# Patient Record
Sex: MALE | Race: WHITE | NOT HISPANIC OR LATINO | Employment: OTHER | URBAN - METROPOLITAN AREA
[De-identification: names, ages, dates, MRNs, and addresses within clinical notes are randomized per-mention and may not be internally consistent; named-entity substitution may affect disease eponyms.]

---

## 2017-01-03 ENCOUNTER — PATIENT OUTREACH (OUTPATIENT)
Dept: HEALTH INFORMATION MANAGEMENT | Facility: OTHER | Age: 67
End: 2017-01-03

## 2017-01-03 ENCOUNTER — TELEPHONE (OUTPATIENT)
Dept: HEALTH INFORMATION MANAGEMENT | Facility: OTHER | Age: 67
End: 2017-01-03

## 2017-01-03 NOTE — TELEPHONE ENCOUNTER
Please have patient schedule appointment so we can evaluate him for his weight gain and his decreased urinary output.  Marlo Reynolds M.D.

## 2017-01-03 NOTE — PROGRESS NOTES
Pt outreach phone call to LSW. Pt reports that he needs to get to see provider on Friday 01/06/2017, but does not have transportation.    LSW inquired if pt had utilized [facility] as discussed in the past. Pt reports that he has not. LSW inquired if pt would be interested in re-applying for [facility], as pt has utilized their services in the past. Pt reports that he is not interested at thi time. LSW discussed with pt that pt should first contact [facility] to determine if they would be able to assist with transportation to pt's appt. On Friday. If pt is unable to, pt should contact LSW. Pt agreeable.    LSW gave pt contact information for [facility] and reminded pt of LSW's contact information.    Pt called LSW and left message. Pt reports that [facility] should be able to take pt to appt and pt will contact LSW if he should have any issues.

## 2017-01-03 NOTE — PROGRESS NOTES
CC outreach call to patient for follow-up on PCP message for patient to schedule appointment. CC noted appointment scheduled for 1/26/17.  CC discussed with patient and recommended patient schedule sooner appointment for weight gain and decreased urinary output.  Patient states he wasn't sure if he would have transportation this week. Patient has been working with  on transportation issues. Patient states he has not contacted [facility] for transportation as previously recommended. CC scheduled patient for sooner appointment with PCP for 1/6/17 at 11:00am. Patient understands to contact [facility] for transportation. CC gave update to .  Per discussion with , patient was able to arrange transportation for Friday 1/6/17.

## 2017-01-03 NOTE — PROGRESS NOTES
"Outreach call after receiving alert on [facility] program.  Patients weight is up 5.5 pounds since 12/30/16. Current weight 167.5 today. Weight on 12/30/16 162 lbs.  Patient states he is not urinating as frequently as he was. States urine output has been decreasing over the past few days. States \"I'm only going small amounts\".  States no leg edema. Reports no worsening shortness of breath.  Reports he is taking lasix 20 mg daily. Blood pressure reading today is 146/71.   CC will send message to PCP for recommendations.            "

## 2017-01-05 ENCOUNTER — PATIENT OUTREACH (OUTPATIENT)
Dept: HEALTH INFORMATION MANAGEMENT | Facility: OTHER | Age: 67
End: 2017-01-05

## 2017-01-05 NOTE — PROGRESS NOTES
Outreach call to patient for follow-up after receiving alert on RHM. Weight today is 170 lbs. Up from yesterday of 167.5.  CC left message requesting return call to [phone]. Patient is scheduled to see PCP tomorrow regarding weight gain and decreased urinary output.

## 2017-01-06 ENCOUNTER — OFFICE VISIT (OUTPATIENT)
Dept: MEDICAL GROUP | Facility: MEDICAL CENTER | Age: 67
End: 2017-01-06
Payer: MEDICARE

## 2017-01-06 ENCOUNTER — HOSPITAL ENCOUNTER (OUTPATIENT)
Facility: MEDICAL CENTER | Age: 67
End: 2017-01-06
Attending: NURSE PRACTITIONER
Payer: MEDICARE

## 2017-01-06 ENCOUNTER — SUPERVISING PHYSICIAN REVIEW (OUTPATIENT)
Dept: MEDICAL GROUP | Facility: MEDICAL CENTER | Age: 67
End: 2017-01-06

## 2017-01-06 ENCOUNTER — PATIENT OUTREACH (OUTPATIENT)
Dept: HEALTH INFORMATION MANAGEMENT | Facility: OTHER | Age: 67
End: 2017-01-06

## 2017-01-06 VITALS
SYSTOLIC BLOOD PRESSURE: 144 MMHG | DIASTOLIC BLOOD PRESSURE: 80 MMHG | TEMPERATURE: 97.5 F | BODY MASS INDEX: 24.62 KG/M2 | OXYGEN SATURATION: 80 % | WEIGHT: 172 LBS | HEART RATE: 94 BPM | RESPIRATION RATE: 16 BRPM | HEIGHT: 70 IN

## 2017-01-06 DIAGNOSIS — J96.11 CHRONIC RESPIRATORY FAILURE WITH HYPOXIA (HCC): ICD-10-CM

## 2017-01-06 DIAGNOSIS — R30.0 DYSURIA: ICD-10-CM

## 2017-01-06 DIAGNOSIS — R63.5 WEIGHT GAIN: ICD-10-CM

## 2017-01-06 DIAGNOSIS — K74.60 CIRRHOSIS OF LIVER WITHOUT ASCITES, UNSPECIFIED HEPATIC CIRRHOSIS TYPE (HCC): ICD-10-CM

## 2017-01-06 DIAGNOSIS — I10 ESSENTIAL HYPERTENSION: Chronic | ICD-10-CM

## 2017-01-06 LAB
APPEARANCE UR: CLEAR
BILIRUB UR STRIP-MCNC: NORMAL MG/DL
COLOR UR AUTO: YELLOW
GLUCOSE UR STRIP.AUTO-MCNC: NORMAL MG/DL
KETONES UR STRIP.AUTO-MCNC: NORMAL MG/DL
LEUKOCYTE ESTERASE UR QL STRIP.AUTO: NORMAL
NITRITE UR QL STRIP.AUTO: NORMAL
PH UR STRIP.AUTO: 5 [PH] (ref 5–8)
PROT UR QL STRIP: NORMAL MG/DL
RBC UR QL AUTO: NORMAL
SP GR UR STRIP.AUTO: 1.01
UROBILINOGEN UR STRIP-MCNC: NORMAL MG/DL

## 2017-01-06 PROCEDURE — 81003 URINALYSIS AUTO W/O SCOPE: CPT

## 2017-01-06 PROCEDURE — 99214 OFFICE O/P EST MOD 30 MIN: CPT | Performed by: NURSE PRACTITIONER

## 2017-01-06 PROCEDURE — 81002 URINALYSIS NONAUTO W/O SCOPE: CPT | Performed by: NURSE PRACTITIONER

## 2017-01-06 RX ORDER — AMLODIPINE BESYLATE 5 MG/1
5 TABLET ORAL DAILY
Qty: 90 TAB | Refills: 0 | Status: SHIPPED | OUTPATIENT
Start: 2017-01-06 | End: 2017-01-26 | Stop reason: SDUPTHER

## 2017-01-06 NOTE — PROGRESS NOTES
Subjective:   Mumtaz Carney is a 66 y.o. male here today for weight gain and hypertension.    Chronic respiratory failure with hypoxia  Patient's O2 saturation upon reaming was down to 80% on room air. He usually wears oxygen at 3 L, his tank ran out during transportation to the office.  Upon replacing O2 at 3 L oxygen up to 93-96%. I recommended that we contact [facility] to have them come to office in refill his tank. He refuses. States he has a ride home and will replace oxygen upon going home.  Has been noncompliant with his inhalers.  Was prescribed Advair in October but did not start. He believes it was not covered by insurance. Also prescribe Spiriva was out for a month but restarted last week. Has been using his albuterol 2-3 times daily. Between oxygen and albuterol use he denies shortness of breath. He is scheduled to follow-up with pulmonology in 4 days.      HTN (hypertension)  Patient is followed by [facility]HM program. He was advised by outreach RN to follow-up in office for elevated readings and decreased urinary output dysuria.  Initial BP reading this morning was 173/89, 20 minutes later down to 154/84.  Readings have been fluctuating over the last week. See patient outreach note for list of readings. Current medications include Coreg 3.125 mg twice daily, lisinopril 10 mg daily, Lasix 20 mg daily and potassium 20 mEq daily. He has been taking as prescribed without known adverse affect.   Not following a low salt diet. Working on tobacco cessation. No exercise.  Denies chest pain, palpitations, vision changes, pedal edema.      Weight gain  Patient has had a 10 pound weight gain since December 26.  He has known cirrhosis.  Also reports decreased urinary output. Reports urinating between 8-10 times yesterday. States he was up to urinate every couple hours last night. States he has the urge to urinate but has small output. Feels that he is fully emptying his bladder. Denies hematuria and painful  urination. Denies fever, abdominal, suprapubic, flank and low back pain. UA in office today is unremarkable.             Current medicines (including changes today)  Current Outpatient Prescriptions   Medication Sig Dispense Refill   • amlodipine (NORVASC) 5 MG Tab Take 1 Tab by mouth every day. 90 Tab 0   • oxycodone-acetaminophen (PERCOCET) 7.5-325 MG per tablet Take 1 Tab by mouth every 6 hours as needed for Severe Pain. May refill 11/30/16 120 Tab 0   • oxycodone-acetaminophen (PERCOCET) 7.5-325 MG per tablet Take 1 Tab by mouth every 6 hours as needed. 120 Tab 0   • oxycodone-acetaminophen (PERCOCET) 7.5-325 MG per tablet Take 1 Tab by mouth every 6 hours as needed. 120 Tab 0   • ondansetron (ZOFRAN ODT) 4 MG TABLET DISPERSIBLE Take 1 Tab by mouth every 8 hours as needed for Nausea/Vomiting. 10 Tab 0   • furosemide (LASIX) 20 MG Tab TAKE 1 TABLET BY MOUTH DAILY 90 Tab 3   • metformin (GLUCOPHAGE) 1000 MG tablet TAKE 1 TABLET BY MOUTH TWICE DAILY WITH MEALS 180 Tab 1   • fluticasone-salmeterol (ADVAIR) 250-50 MCG/DOSE AEROSOL POWDER, BREATH ACTIVATED Inhale 1 Puff by mouth every 12 hours. 1 Inhaler 11   • tamsulosin (FLOMAX) 0.4 MG capsule TAKE 1 CAPSULE BY MOUTH EVERY DAY 90 Cap 1   • lisinopril (PRINIVIL) 10 MG Tab TAKE 1 TABLET BY MOUTH EVERY DAY 90 Tab 3   • alprazolam (XANAX) 0.25 MG Tab Take 1 Tab by mouth at bedtime as needed for Sleep. 90 Tab 1   • pravastatin (PRAVACHOL) 10 MG Tab TAKE 1 TABLET BY MOUTH EVERY DAY 90 Tab 3   • carvedilol (COREG) 3.125 MG Tab TAKE 1 TABLET BY MOUTH TWICE DAILY 180 Tab 3   • albuterol (VENTOLIN OR PROVENTIL) 108 (90 BASE) MCG/ACT Aero Soln inhalation aerosol Inhale 2 Puffs by mouth every 6 hours as needed. 8.5 g 6   • tiotropium (SPIRIVA HANDIHALER) 18 MCG Cap INHALE 1 CAPSULE BY MOUTH VIA HANDIHALER EVERY DAY 90 Cap 3   • potassium chloride SA (K-DUR) 20 MEQ Tab CR TAKE 1 TABLET BY MOUTH EVERY DAY 90 Tab 3   • sertraline (ZOLOFT) 50 MG Tab TAKE 1 TABLET BY MOUTH DAILY 90  "Tab 3   • aspirin 81 MG tablet Take 81 mg by mouth every morning.     • omeprazole (PRILOSEC) 20 MG CPDR Take 20 mg by mouth 2 Times a Day.       No current facility-administered medications for this visit.     He  has a past medical history of Aortic stenosis; Wound; Hypertension; Bronchitis; COPD; CHF (congestive heart failure) (HCC) (6/11/2010); Tobacco abuse (6/11/2010); COPD (chronic obstructive pulmonary disease) (HCC); Severe aortic stenosis (12/17/2013); Hepatitis C (1/16/2014); Other specified disorder of intestines; Indigestion; Heart burn; Other emphysema (HCC); Breath shortness; Psychiatric problem; MRSA (methicillin resistant Staphylococcus aureus); and Cirrhosis (HCC) (2014). He also has no past medical history of Unspecified hemorrhagic conditions (HCC), Personal history of venous thrombosis and embolism, Cancer (HCC), Diabetes, Unspecified disorder of thyroid, Glaucoma, CATARACT, Stroke (HCC), Seizure (HCC), Myocardial infarct (HCC), Arrhythmia, Pacemaker, Angina, Heart murmur, Rheumatic fever, ASTHMA, Pneumonia, Jaundice, Unspecified urinary incontinence, Renal disorder, Dialysis, Other specified symptom associated with female genital organs, Arthritis, or Backpain.    ROS   No chest pain, no shortness of breath, no abdominal pain       Objective:     Blood pressure 144/80, pulse 94, temperature 36.4 °C (97.5 °F), resp. rate 16, height 1.778 m (5' 10\"), weight 78.019 kg (172 lb), SpO2 80 %. Body mass index is 24.68 kg/(m^2).   Physical Exam:  Constitutional: Alert, no distress.  Skin: Warm, dry, good turgor  Eye: Equal, round and reactive, conjunctiva clear, lids normal.  ENMT: Lips without lesions, good dentition, oropharynx clear.  Respiratory: Unlabored respiratory effort, lungs clear to auscultation, no wheezes, no ronchi.  Cardiovascular: Normal S1, S2, no murmur, no pedal edema.  Abdomen: Nontender. No hepatosplenomegaly. An upright position abdomen is fluctuant. Non tender. No suprapubic " tenderness.  Psych: Alert and oriented x3, normal affect and mood.        Assessment and Plan:   The following treatment plan was discussed    1. Chronic respiratory failure with hypoxia (HCC)  Advised patient to use all inhalers as prescribed by pulmonology.  Continue smoking cessation encouraged smoking cessation.  Continue oxygen use at 3 L via NC.  Follow-up with pulmonology as scheduled in 4 days.  .    2. Weight gain  New finding.  Abdominal ultrasound ordered to rule out ascites.  CMP ordered.  Follow-up with PCP after tests.    3. Essential hypertension  Uncontrolled.  Continue BP meds as prescribed by PCP.  Add amlodipine 5 mg daily.   Reviewed common adverse effects of medication in detail with pt.  Continue daily BP monitoring.  Echocardiogram, CMP and urine microalbumin creatinine ratio ordered.  Follow-up with PCP after test.    4. Dysuria  UA in office unremarkable. Sent for microscopic analysis and culture.   Complete labs and imaging as stated above and f/u with PCP.    Followup: Return in about 2 weeks (around 1/20/2017).

## 2017-01-06 NOTE — ASSESSMENT & PLAN NOTE
Patient has had a 10 pound weight gain since December 26.  He has known cirrhosis.  Also reports decreased urinary output. Reports urinating between 8-10 times yesterday. States he was up to urinate every couple hours last night. States he has the urge to urinate but has small output. Feels that he is fully emptying his bladder. Denies hematuria and painful urination. Denies fever, abdominal, suprapubic, flank and low back pain. UA in office today is unremarkable.

## 2017-01-06 NOTE — ASSESSMENT & PLAN NOTE
Patient is followed by [facility] program. He was advised by outreach RN to follow-up in office for elevated readings and decreased urinary output dysuria.  Initial BP reading this morning was 173/89, 20 minutes later down to 154/84.  Readings have been fluctuating over the last week. See patient outreach note for list of readings. Current medications include Coreg 3.125 mg twice daily, lisinopril 10 mg daily, Lasix 20 mg daily and potassium 20 mEq daily. He has been taking as prescribed without known adverse affect.   Not following a low salt diet. Working on tobacco cessation. No exercise.  Denies chest pain, palpitations, vision changes, pedal edema.

## 2017-01-06 NOTE — MR AVS SNAPSHOT
"        Mumtaz Carney   2017 11:00 AM   Office Visit   MRN: [id number]    Department:  [facility]   Dept Phone:  [phone]    Description:  Male : 1950   Provider:  JAYNE Loaiza           Reason for Visit     Other weight gain     Urinary Retention           Allergies as of 2017     No Known Allergies      You were diagnosed with     Chronic respiratory failure with hypoxia (HCC)   [213186]       Weight gain   [793239]       Essential hypertension   [6282917]       Dysuria   [788.1.ICD-9-CM]         Vital Signs     Blood Pressure Pulse Temperature Respirations Height Weight    144/80 mmHg 94 36.4 °C (97.5 °F) 16 1.778 m (5' 10\") 78.019 kg (172 lb)    Body Mass Index Oxygen Saturation Smoking Status             24.68 kg/m2 80% Current Some Day Smoker         Basic Information     Date Of Birth Sex Race Ethnicity Preferred Language    1950 Male White Non- English      Your appointments     Eliezer 10, 2017  2:00 PM   Pulmonary Function Test with PFT-RM2   [facility] (--)    [address]  [address]  [place] [id number] [phone]            Eliezer 10, 2017  3:00 PM   Established Patient Pul with Osei Garcia M.D.   [facility] (--)    [address]  [address]  [place] [id number] [phone]            2017 10:20 AM   Established Patient with Marlo Reynolds M.D.   [facility] [place] ([facility])    [address]  [place] [id number] [phone]           You will be receiving a confirmation call a few days before your appointment from our automated call confirmation system.              Problem List              ICD-10-CM Priority Class Noted - Resolved    COPD (chronic obstructive pulmonary disease) (HCC) (Chronic) J44.9   2010 - Present    Tobacco abuse (Chronic) Z72.0   2010 - Present    HTN (hypertension) (Chronic) I10   2010 - Present    Hx of drug " abuse Z87.898   12/17/2013 - Present    Elevated liver enzymes R74.8   12/17/2013 - Present    BPH (benign prostatic hyperplasia) N40.0   12/17/2013 - Present    Hepatitis C B19.20   1/16/2014 - Present    Cirrhosis (HCC) K74.60   7/18/2014 - Present    Diabetes mellitus type 2, controlled (HCC) E11.9   7/29/2014 - Present    Insomnia G47.00   8/21/2014 - Present    Chronic lower back pain M54.5, G89.29   8/28/2014 - Present    Hyperlipidemia E78.5   3/18/2015 - Present    MDD (major depressive disorder), recurrent episode, mild (HCC) F33.0   7/19/2015 - Present    Right hip pain M25.551   9/15/2015 - Present    Drug-induced hypokalemia E87.6   2/28/2016 - Present    Chronic respiratory failure with hypoxia (HCC) J96.11   5/23/2016 - Present    Chronic, continuous use of opioids F11.90   11/28/2016 - Present    Potential for deficient knowledge of chronic obstructive pulmonary disease Z91.89   12/1/2016 - Present    Weight gain R63.5   1/6/2017 - Present      Health Maintenance        Date Due Completion Dates    RETINAL SCREENING 11/12/1967 ---    IMM DTaP/Tdap/Td Vaccine (1 - Tdap) 11/12/1968 ---    IMM ZOSTER VACCINE 11/12/2009 ---    DIABETES MONOFILAMTENT / LE EXAM 3/18/2016 3/18/2015, 8/28/2014    IMM INFLUENZA (1) 9/1/2016 ---    A1C SCREENING 2/27/2017 8/27/2016, 2/19/2016, 9/9/2015, 6/22/2015, 6/16/2015, 3/3/2015, 3/3/2015, 7/18/2014, 8/8/2012    FASTING LIPID PROFILE 8/27/2017 8/27/2016, 2/19/2016, 9/9/2015, 6/16/2015, 3/3/2015, 3/3/2015, 9/10/2014    URINE ACR / MICROALBUMIN 8/27/2017 8/27/2016, 2/19/2016, 3/3/2015, 9/10/2014 (Done)    Override on 9/10/2014: Done    SERUM CREATININE 11/22/2017 11/22/2016, 9/7/2016, 8/27/2016, 2/19/2016, 9/9/2015, 6/16/2015, 3/3/2015, 3/3/2015, 9/10/2014, 7/23/2014, 7/20/2014, 7/19/2014, 7/19/2014, 7/18/2014, 7/18/2014, 7/18/2014, 8/8/2012, 6/11/2010, 7/16/2009, 5/1/2006, 4/22/2006, 2/15/2005    COLONOSCOPY 7/10/2019 7/10/2014    IMM PNEUMOCOCCAL 65+ (ADULT) LOW/MEDIUM  RISK SERIES (2 of 2 - PPSV23) 7/23/2019 8/29/2016, 7/23/2014            Results     POCT Urinalysis      Component Value Standard Range & Units    POC Color YELLOW Negative    POC Appearance CLEAR Negative    POC Leukocyte Esterase NEG Negative    POC Nitrites NEG Negative    POC Urobiligen NEG Negative (0.2) mg/dL    POC Protein NEG Negative mg/dL    POC Urine PH 5.0 5.0 - 8.0    POC Blood NEG Negative    POC Specific Gravity 1.015 <1.005 - >1.030    POC Ketones NEG Negative mg/dL    POC Biliruben NEG Negative mg/dL    POC Glucose NEG Negative mg/dL                        Current Immunizations     13-VALENT PCV PREVNAR 8/29/2016    Pneumococcal polysaccharide vaccine (PPSV-23) 7/23/2014  9:23 AM      Below and/or attached are the medications your provider expects you to take. Review all of your home medications and newly ordered medications with your provider and/or pharmacist. Follow medication instructions as directed by your provider and/or pharmacist. Please keep your medication list with you and share with your provider. Update the information when medications are discontinued, doses are changed, or new medications (including over-the-counter products) are added; and carry medication information at all times in the event of emergency situations     Allergies:  No Known Allergies          Medications  Valid as of: January 06, 2017 - 11:21 AM    Generic Name Brand Name Tablet Size Instructions for use    Albuterol Sulfate (Aero Soln) albuterol 108 (90 BASE) MCG/ACT Inhale 2 Puffs by mouth every 6 hours as needed.        ALPRAZolam (Tab) XANAX 0.25 MG Take 1 Tab by mouth at bedtime as needed for Sleep.        Aspirin (Tab) aspirin 81 MG Take 81 mg by mouth every morning.        Carvedilol (Tab) COREG 3.125 MG TAKE 1 TABLET BY MOUTH TWICE DAILY        Fluticasone-Salmeterol (AEROSOL POWDER, BREATH ACTIVATED) ADVAIR 250-50 MCG/DOSE Inhale 1 Puff by mouth every 12 hours.        Furosemide (Tab) LASIX 20 MG TAKE 1  TABLET BY MOUTH DAILY        Lisinopril (Tab) PRINIVIL 10 MG TAKE 1 TABLET BY MOUTH EVERY DAY        MetFORMIN HCl (Tab) GLUCOPHAGE 1000 MG TAKE 1 TABLET BY MOUTH TWICE DAILY WITH MEALS        Omeprazole (CAPSULE DELAYED RELEASE) PRILOSEC 20 MG Take 20 mg by mouth 2 Times a Day.        Ondansetron (TABLET DISPERSIBLE) ZOFRAN ODT 4 MG Take 1 Tab by mouth every 8 hours as needed for Nausea/Vomiting.        Oxycodone-Acetaminophen (Tab) PERCOCET 7.5-325 MG Take 1 Tab by mouth every 6 hours as needed for Severe Pain. May refill 11/30/16        Oxycodone-Acetaminophen (Tab) PERCOCET 7.5-325 MG Take 1 Tab by mouth every 6 hours as needed.        Oxycodone-Acetaminophen (Tab) PERCOCET 7.5-325 MG Take 1 Tab by mouth every 6 hours as needed.        Potassium Chloride Abi CR (Tab CR) K-DUR 20 MEQ TAKE 1 TABLET BY MOUTH EVERY DAY        Pravastatin Sodium (Tab) PRAVACHOL 10 MG TAKE 1 TABLET BY MOUTH EVERY DAY        Sertraline HCl (Tab) ZOLOFT 50 MG TAKE 1 TABLET BY MOUTH DAILY        Tamsulosin HCl (Cap) FLOMAX 0.4 MG TAKE 1 CAPSULE BY MOUTH EVERY DAY        Tiotropium Bromide Monohydrate (Cap) SPIRIVA 18 MCG INHALE 1 CAPSULE BY MOUTH VIA HANDIHALER EVERY DAY        .                 Medicines prescribed today were sent to:     [facility] [address] [place] [address] [address] [facility] [address]    Phone: [phone] Fax: [phone]    Open 24 Hours?: Yes      Medication refill instructions:       If your prescription bottle indicates you have medication refills left, it is not necessary to call your provider’s office. Please contact your pharmacy and they will refill your medication.    If your prescription bottle indicates you do not have any refills left, you may request refills at any time through one of the following ways: The online [employer] system (except Urgent Care), by calling your provider’s office, or by asking your pharmacy to contact your provider’s  office with a refill request. Medication refills are processed only during regular business hours and may not be available until the next business day. Your provider may request additional information or to have a follow-up visit with you prior to refilling your medication.   *Please Note: Medication refills are assigned a new Rx number when refilled electronically. Your pharmacy may indicate that no refills were authorized even though a new prescription for the same medication is available at the pharmacy. Please request the medicine by name with the pharmacy before contacting your provider for a refill.        Your To Do List     Future Labs/Procedures Complete By Expires    COMP METABOLIC PANEL  As directed 1/7/2018    ECHOCARDIOGRAM COMP W/O CONT  As directed 1/7/2018    MICROALBUMIN CREAT RATIO URINE  As directed 1/7/2018    URINALYSIS,CULTURE IF INDICATED  As directed 1/7/2018      Other Notes About Your Plan     Enrolled in [facility] Team with Dr. Reynolds.           Ema Status: Patient Declined

## 2017-01-06 NOTE — PROGRESS NOTES
Outreach call to patient.  Alert received on RHM readings.  Weight today is 170.5 lbs. Up 10 lbs since 12/26/16.   Blood pressure reading is 154/84.  Patient reports he still has intermittent problem with urinating. He is scheduled to see his PCP at 11:00 today. States he does have transportation to appointment.  Below is RHM readings over past week.  Patient states he will discuss weight gain and blood pressure readings at PCP appointment. Patient will also discuss problem with decreased urinary output.CC will continue to follow in [facility]HM program.

## 2017-01-06 NOTE — PROGRESS NOTES
I have reviewed and agree with history, assessment and plan for office encounter on 1/6/2017 with Carie Zhang.  Face to Face encounter/direct observation: no  Suggestions as follows: No change to plan or follow up  Marlo Reynolds M.D.

## 2017-01-06 NOTE — ASSESSMENT & PLAN NOTE
Patient's O2 saturation upon reaming was down to 80% on room air. He usually wears oxygen at 3 L, his tank ran out during transportation to the office.  Upon replacing O2 at 3 L oxygen up to 93-96%. I recommended that we contact [facility] to have them come to office in refill his tank. He refuses. States he has a ride home and will replace oxygen upon going home.  Has been noncompliant with his inhalers.  Was prescribed Advair in October but did not start. He believes it was not covered by insurance. Also prescribe Spiriva was out for a month but restarted last week. Has been using his albuterol 2-3 times daily. Between oxygen and albuterol use he denies shortness of breath. He is scheduled to follow-up with pulmonology in 4 days.

## 2017-01-07 LAB
APPEARANCE UR: CLEAR
BILIRUB UR QL STRIP.AUTO: NEGATIVE
COLOR UR AUTO: NORMAL
CULTURE IF INDICATED INDCX: NO UA CULTURE
GLUCOSE UR STRIP.AUTO-MCNC: NEGATIVE MG/DL
KETONES UR STRIP.AUTO-MCNC: NEGATIVE MG/DL
LEUKOCYTE ESTERASE UR QL STRIP.AUTO: NEGATIVE
MICRO URNS: NORMAL
NITRITE UR QL STRIP.AUTO: NEGATIVE
PH UR: 5 [PH]
PROT UR QL STRIP: NEGATIVE MG/DL
RBC UR QL AUTO: NEGATIVE
SP GR UR STRIP.AUTO: 1.01

## 2017-01-09 ENCOUNTER — PATIENT OUTREACH (OUTPATIENT)
Dept: HEALTH INFORMATION MANAGEMENT | Facility: OTHER | Age: 67
End: 2017-01-09

## 2017-01-09 ENCOUNTER — TELEPHONE (OUTPATIENT)
Dept: MEDICAL GROUP | Facility: MEDICAL CENTER | Age: 67
End: 2017-01-09

## 2017-01-09 NOTE — TELEPHONE ENCOUNTER
----- Message from JAYNE Zeng sent at 1/9/2017 10:50 AM PST -----  Please advise patient;  Urine sample shows no indication of infection

## 2017-01-09 NOTE — PROGRESS NOTES
Outreach call after receiving alerts on RHM.  B/P readings remain elevated. Today's reading is 168/98. CC requested patient recheck reading. Patient reports he has not yet taken his medications today. CC recommended patient take medications and recheck reading later today.  CC reviewed PCP recommendations from Friday's appointment on 1/6/17. Patient did not  new prescription for amlodipine. States he will get prescription today. CC strongly recommended patient get prescription and take as directed for elevated B/P readings.   Reviewed PCP recommendation for Ultrasound of abdomen, echocardiogram. Also patient is questioning CC if he still needs to complete a CT scan. CC noted order in [facility] for CT of chest ordered 10/7/17 by Dr. Garcia. Patient states he did not get this completed.  Patient is requesting assistance from CC to get the imaging tests scheduled. CC contacted imaging at [phone] and was placed on hold. CC left message requesting return call to [phone] to assist patient with scheduling.  Plan:  CC will assist patient with scheduling appointments after receiving return call from imaging.  CC recommended patient get prescription filled for amlodipine and start today as ordered by PCP.  CC reviewed upcoming appointment tomorrow with pulmonary provider at 2:00 pm. Patient needed CC to review appointment date several times. Patient reports he did appreciate transportation assistance and will contact [facility] for transportation to appointments.   CC discussed with patient to have full tank of oxygen when going to appointments. Patient previously ran out of oxygen at PCP appointment on 1/6/17. Patient voiced understanding.

## 2017-01-10 ENCOUNTER — PATIENT OUTREACH (OUTPATIENT)
Dept: HEALTH INFORMATION MANAGEMENT | Facility: OTHER | Age: 67
End: 2017-01-10

## 2017-01-10 ENCOUNTER — APPOINTMENT (OUTPATIENT)
Dept: PULMONOLOGY | Facility: HOSPICE | Age: 67
End: 2017-01-10
Payer: MEDICARE

## 2017-01-10 NOTE — PROGRESS NOTES
Outreach call after receiving alert on RHM readings. B/P today is 159/91. Patient states he did get prescription filled for amlodipine and is taking as directed.  Reviewed upcoming appointments. Patient states he received a call from pulmonary office to cancel appointment for today. CC recommended patient contact pulmonary office to determine if appointment is needing to be rescheduled. Patient voiced understanding and states he does have telephone number.

## 2017-01-10 NOTE — PROGRESS NOTES
Patient left message stating he is having a difficult time scheduling appointment. CC returned patient call. States he is frustrated because he doesn't know how to schedule the CT scan. Reports pulmonary office cancelled his appointment today.  CC contacted Oralia at pulmonary office regarding patient questioning if he needs to have CT done and if order is in Epic. Oralia will contact patient to assist getting  CT scheduled for patient.

## 2017-01-12 ENCOUNTER — PATIENT OUTREACH (OUTPATIENT)
Dept: HEALTH INFORMATION MANAGEMENT | Facility: OTHER | Age: 67
End: 2017-01-12

## 2017-01-12 ENCOUNTER — APPOINTMENT (OUTPATIENT)
Dept: RADIOLOGY | Facility: MEDICAL CENTER | Age: 67
End: 2017-01-12
Attending: INTERNAL MEDICINE
Payer: MEDICARE

## 2017-01-12 NOTE — PROGRESS NOTES
Outreach call to patient for follow-up on RHM readings. B/P today at 162/85. Patient reports he is taking all of his B/P medications including amlodipine that was ordered at last weeks appointment with PCP.  Reports no change in condition today. Reports he will get CT completed tomorrow.

## 2017-01-13 ENCOUNTER — HOSPITAL ENCOUNTER (OUTPATIENT)
Dept: RADIOLOGY | Facility: MEDICAL CENTER | Age: 67
End: 2017-01-13
Attending: INTERNAL MEDICINE
Payer: MEDICARE

## 2017-01-13 DIAGNOSIS — J44.9 CHRONIC OBSTRUCTIVE PULMONARY DISEASE, UNSPECIFIED COPD TYPE (HCC): ICD-10-CM

## 2017-01-13 PROCEDURE — 71250 CT THORAX DX C-: CPT

## 2017-01-16 ENCOUNTER — PATIENT OUTREACH (OUTPATIENT)
Dept: HEALTH INFORMATION MANAGEMENT | Facility: OTHER | Age: 67
End: 2017-01-16

## 2017-01-16 RX ORDER — ASPIRIN 325 MG
325 TABLET ORAL DAILY
COMMUNITY

## 2017-01-16 NOTE — PROGRESS NOTES
Outbound call to Mumtaz for medication reconciliation.    Updated allergy and medication lists.    Patient demonstrates understanding of indications for medications.    Mumtaz states that he had carvedilol tablets in his furosemide bottle. He believes that when he picked up his medication from the pharmacy he dumped his extra tablets into the newer bottle and that is how they got mixed up. I reviewed the tablet descriptions of these medications with the patient to clarify each. He states that he only has 2 tablets of his furosemide left. I called [facility] on the patient's behalf to request a refill of his furosemide. Camryn at [facility] stated the patient picked up 90 day supply on 11/6/16 and the fill is too soon. I called patient back to explain this and he stated that he did not receive that. He will call Camryn at [facility] to discuss.     Patient denies any side effects from his current medications.     Mumtaz reports that he seldomly uses the alprazolam. When he does take this he uses it more for anxiety than for sleep. He reports that at most he will use 3 to 4 tablets in a week. He denies dizziness/ drowsiness with this medication and is aware that he should not combine the alprazolam with the percocet.     Patient recently quit smoking. He is now using Nicotine replacement patches. Patient reports that he has significantly cut back on the number of cigarettes he smokes.     Mumtaz reports that his fasting blood sugars are running between 110-120.     Patient can afford his medications.    Plan:    Recommend that Mumtaz receive his flu vaccination and shingles vaccination. The patient stated that he will get these the next time he goes to his pharmacy.     Recommended the patient could try a weekly pill container to sort out his medications. He declines this and states that he has a system with his bottles.     Carly Curry, JENNIFERD

## 2017-01-19 ENCOUNTER — PATIENT OUTREACH (OUTPATIENT)
Dept: HEALTH INFORMATION MANAGEMENT | Facility: OTHER | Age: 67
End: 2017-01-19

## 2017-01-19 NOTE — PROGRESS NOTES
CC outreach call after receiving alert on RHM program. Message left requesting return call to CC at [phone]. B/P readings continue to be elevated. See below. Patient has a follow-up with PCP for 1/26/17.

## 2017-01-23 ENCOUNTER — PATIENT OUTREACH (OUTPATIENT)
Dept: HEALTH INFORMATION MANAGEMENT | Facility: OTHER | Age: 67
End: 2017-01-23

## 2017-01-23 NOTE — PROGRESS NOTES
Outreach call after receiving alert on RHM program. Blood pressure readings remain elevated. Patient is scheduled to see PCP on 1/26/17 and will review blood pressure. Patient states he is taking his medications as directed.   Reports no chest pain, no headache. States he has been stressed trying to get his television to work. States he has stress because of the paperwork he needs to get completed for Medicaid. Reports he needs to get to Medicaid office today.  CC reviewed all upcoming appointments. Patient is trying to get transportation with [facility]. States he may need to take the bus. Patient has worked with  on Transportation.

## 2017-01-24 ENCOUNTER — PATIENT OUTREACH (OUTPATIENT)
Dept: HEALTH INFORMATION MANAGEMENT | Facility: OTHER | Age: 67
End: 2017-01-24

## 2017-01-24 NOTE — PROGRESS NOTES
Outreach call after receiving alert on RHM readings. B/P readings remain elevated. Patient denies any chest pain or shortness of breath. Scheduled to see PCP on 1/26/17. Previous readings at PCP office in November have been in good range.   Patient will discuss his blood pressure with PCP on 1/26/17. Update of readings sent to PCP.

## 2017-01-25 ENCOUNTER — HOSPITAL ENCOUNTER (OUTPATIENT)
Dept: CARDIOLOGY | Facility: MEDICAL CENTER | Age: 67
End: 2017-01-25
Attending: FAMILY MEDICINE | Admitting: FAMILY MEDICINE
Payer: MEDICARE

## 2017-01-25 ENCOUNTER — TELEPHONE (OUTPATIENT)
Dept: MEDICAL GROUP | Facility: MEDICAL CENTER | Age: 67
End: 2017-01-25

## 2017-01-25 ENCOUNTER — HOSPITAL ENCOUNTER (OUTPATIENT)
Dept: RADIOLOGY | Facility: MEDICAL CENTER | Age: 67
End: 2017-01-25
Attending: NURSE PRACTITIONER
Payer: MEDICARE

## 2017-01-25 DIAGNOSIS — R63.5 WEIGHT GAIN: ICD-10-CM

## 2017-01-25 DIAGNOSIS — K74.60 CIRRHOSIS OF LIVER WITHOUT ASCITES, UNSPECIFIED HEPATIC CIRRHOSIS TYPE (HCC): ICD-10-CM

## 2017-01-25 DIAGNOSIS — I10 ESSENTIAL HYPERTENSION: Chronic | ICD-10-CM

## 2017-01-25 LAB
LV EJECT FRACT  99904: 55
LV EJECT FRACT MOD 2C 99903: 57.23
LV EJECT FRACT MOD 4C 99902: 61.97
LV EJECT FRACT MOD BP 99901: 59.74

## 2017-01-25 PROCEDURE — 76705 ECHO EXAM OF ABDOMEN: CPT

## 2017-01-25 PROCEDURE — 93306 TTE W/DOPPLER COMPLETE: CPT | Mod: 26 | Performed by: INTERNAL MEDICINE

## 2017-01-25 NOTE — TELEPHONE ENCOUNTER
----- Message from Marlo Reynolds M.D. sent at 1/25/2017 12:18 PM PST -----  Please notify patient that there is no fluid in his abdomen.  Marlo Reynolds M.D.

## 2017-01-25 NOTE — TELEPHONE ENCOUNTER
----- Message from Marlo Reynolds M.D. sent at 1/25/2017 12:18 PM PST -----  Please notify patient that heart function is normal.  Marlo Reynolds M.D.

## 2017-01-26 ENCOUNTER — OFFICE VISIT (OUTPATIENT)
Dept: MEDICAL GROUP | Facility: MEDICAL CENTER | Age: 67
End: 2017-01-26
Payer: MEDICARE

## 2017-01-26 VITALS
HEART RATE: 99 BPM | BODY MASS INDEX: 24.34 KG/M2 | RESPIRATION RATE: 16 BRPM | TEMPERATURE: 97.5 F | WEIGHT: 170 LBS | DIASTOLIC BLOOD PRESSURE: 70 MMHG | HEIGHT: 70 IN | OXYGEN SATURATION: 91 % | SYSTOLIC BLOOD PRESSURE: 124 MMHG

## 2017-01-26 DIAGNOSIS — J44.9 CHRONIC OBSTRUCTIVE PULMONARY DISEASE, UNSPECIFIED COPD TYPE (HCC): Chronic | ICD-10-CM

## 2017-01-26 DIAGNOSIS — G89.29 CHRONIC BILATERAL LOW BACK PAIN WITHOUT SCIATICA: ICD-10-CM

## 2017-01-26 DIAGNOSIS — K74.60 CIRRHOSIS OF LIVER WITHOUT ASCITES, UNSPECIFIED HEPATIC CIRRHOSIS TYPE (HCC): ICD-10-CM

## 2017-01-26 DIAGNOSIS — E11.9 CONTROLLED TYPE 2 DIABETES MELLITUS WITHOUT COMPLICATION, WITHOUT LONG-TERM CURRENT USE OF INSULIN (HCC): ICD-10-CM

## 2017-01-26 DIAGNOSIS — I10 ESSENTIAL HYPERTENSION: Chronic | ICD-10-CM

## 2017-01-26 DIAGNOSIS — M54.50 CHRONIC BILATERAL LOW BACK PAIN WITHOUT SCIATICA: ICD-10-CM

## 2017-01-26 DIAGNOSIS — J96.11 CHRONIC RESPIRATORY FAILURE WITH HYPOXIA (HCC): ICD-10-CM

## 2017-01-26 LAB
HBA1C MFR BLD: 6 % (ref ?–5.8)
INT CON NEG: NEGATIVE
INT CON POS: POSITIVE

## 2017-01-26 PROCEDURE — G8484 FLU IMMUNIZE NO ADMIN: HCPCS | Performed by: FAMILY MEDICINE

## 2017-01-26 PROCEDURE — 4040F PNEUMOC VAC/ADMIN/RCVD: CPT | Performed by: FAMILY MEDICINE

## 2017-01-26 PROCEDURE — 3017F COLORECTAL CA SCREEN DOC REV: CPT | Performed by: FAMILY MEDICINE

## 2017-01-26 PROCEDURE — 3044F HG A1C LEVEL LT 7.0%: CPT | Performed by: FAMILY MEDICINE

## 2017-01-26 PROCEDURE — 83036 HEMOGLOBIN GLYCOSYLATED A1C: CPT | Performed by: FAMILY MEDICINE

## 2017-01-26 PROCEDURE — 99214 OFFICE O/P EST MOD 30 MIN: CPT | Performed by: FAMILY MEDICINE

## 2017-01-26 PROCEDURE — 1101F PT FALLS ASSESS-DOCD LE1/YR: CPT | Performed by: FAMILY MEDICINE

## 2017-01-26 PROCEDURE — G8510 SCR DEP NEG, NO PLAN REQD: HCPCS | Performed by: FAMILY MEDICINE

## 2017-01-26 PROCEDURE — 4004F PT TOBACCO SCREEN RCVD TLK: CPT | Performed by: FAMILY MEDICINE

## 2017-01-26 PROCEDURE — G8420 CALC BMI NORM PARAMETERS: HCPCS | Performed by: FAMILY MEDICINE

## 2017-01-26 RX ORDER — LISINOPRIL 20 MG/1
20 TABLET ORAL
Qty: 90 TAB | Refills: 3 | Status: SHIPPED | OUTPATIENT
Start: 2017-01-26 | End: 2017-09-18

## 2017-01-26 RX ORDER — AMLODIPINE BESYLATE 5 MG/1
5 TABLET ORAL DAILY
Qty: 90 TAB | Refills: 0 | Status: SHIPPED | OUTPATIENT
Start: 2017-01-26 | End: 2017-07-03 | Stop reason: SDUPTHER

## 2017-01-26 RX ORDER — OXYCODONE AND ACETAMINOPHEN 7.5; 325 MG/1; MG/1
1 TABLET ORAL EVERY 6 HOURS PRN
Qty: 120 TAB | Refills: 0 | Status: SHIPPED | OUTPATIENT
Start: 2017-01-26 | End: 2017-01-26 | Stop reason: SDUPTHER

## 2017-01-26 RX ORDER — OXYCODONE AND ACETAMINOPHEN 7.5; 325 MG/1; MG/1
1 TABLET ORAL EVERY 6 HOURS PRN
Qty: 120 TAB | Refills: 0 | Status: SHIPPED | OUTPATIENT
Start: 2017-01-26 | End: 2017-04-06 | Stop reason: SDUPTHER

## 2017-01-26 ASSESSMENT — PATIENT HEALTH QUESTIONNAIRE - PHQ9: CLINICAL INTERPRETATION OF PHQ2 SCORE: 2

## 2017-01-26 NOTE — ASSESSMENT & PLAN NOTE
Patient is using inhalers as prescribed. He was using nicotine patch which was causing him not to smoke, but he ran out of nicotine replacement and is now smoking rarely.

## 2017-01-26 NOTE — ASSESSMENT & PLAN NOTE
Long-standing problem managed with chronic opioid therapy   currently taking Percocet 7.5-325 dailymorphine equivalent of 45 mg daily  Analgesia: Overall reports good pain control but does feel that medication wears off between doses at times  Activity: Improved mobility when taking medication  Adverse effects: Denies constipation or oversedation  Aberrant behaviors: Denies alcohol use, last urine drug screen from May 2016 consistent with prescriptions   Affect: Denies current feelings of depression    Does not recall if he had seen a back specialist in the past but certainly has not recently.     report reviewed and without concerning findings    Denies numbness or tingling in lower extremities, weakness, change in bowel or bladder function

## 2017-01-26 NOTE — ASSESSMENT & PLAN NOTE
Patient has been taking amlodipine 5 mg daily and lisinopril 10 mg daily. His blood pressures have been high on home readings. Amlodipine 5 mg daily was started approximately 3 weeks ago. He states that he is starting to notice any improvement more recently with the amlodipine.

## 2017-01-26 NOTE — MR AVS SNAPSHOT
"        Mumtaz Carney   2017 10:20 AM   Office Visit   MRN: [id number]    Department:  [facility]   Dept Phone:  [phone]    Description:  Male : 1950   Provider:  Marlo Reynolds M.D.           Reason for Visit     Results     Other Weight gain and water retention       Allergies as of 2017     No Known Allergies      You were diagnosed with     Essential hypertension   [7949563]       Chronic bilateral low back pain without sciatica   [8735805]       Controlled type 2 diabetes mellitus without complication, without long-term current use of insulin (CMS-HCC)   [2182803]       Chronic respiratory failure with hypoxia (CMS-HCC)   [815584]       Chronic obstructive pulmonary disease, unspecified COPD type (CMS-HCC)   [7095929]       Cirrhosis of liver without ascites, unspecified hepatic cirrhosis type (CMS-HCC)   [6028596]         Vital Signs     Blood Pressure Pulse Temperature Respirations Height Weight    124/70 mmHg 99 36.4 °C (97.5 °F) 16 1.778 m (5' 10\") 77.111 kg (170 lb)    Body Mass Index Oxygen Saturation Smoking Status             24.39 kg/m2 91% Current Some Day Smoker         Basic Information     Date Of Birth Sex Race Ethnicity Preferred Language    1950 Male White Non- English      Your appointments     Mar 01, 2017  3:00 PM   Pulmonary Function Test with PFT-RM2   [facility] (--)    [address]  [address]  [place] [id number] [phone]            Mar 01, 2017  4:00 PM   Established Patient Pul with Osei Garcia M.D.   [facility] (--)    [address]  [address]  [place] [id number] [phone]            Mar 06, 2017  1:00 PM   Established Patient with Marlo Reynolds M.D.   [facility] ([facility])    [address]  [facility] [address] [phone]           You will be receiving a confirmation call a few days before your appointment from our automated " call confirmation system.              Problem List              ICD-10-CM Priority Class Noted - Resolved    COPD (chronic obstructive pulmonary disease) (CMS-HCC) (Chronic) J44.9   6/11/2010 - Present    Tobacco abuse (Chronic) Z72.0   6/11/2010 - Present    HTN (hypertension) (Chronic) I10   6/11/2010 - Present    Hx of drug abuse Z87.898   12/17/2013 - Present    Elevated liver enzymes R74.8   12/17/2013 - Present    BPH (benign prostatic hyperplasia) N40.0   12/17/2013 - Present    Hepatitis C B19.20   1/16/2014 - Present    Cirrhosis (CMS-HCC) K74.60   7/18/2014 - Present    Diabetes mellitus type 2, controlled (CMS-HCC) E11.9   7/29/2014 - Present    Insomnia G47.00   8/21/2014 - Present    Chronic lower back pain M54.5, G89.29   8/28/2014 - Present    Hyperlipidemia E78.5   3/18/2015 - Present    MDD (major depressive disorder), recurrent episode, mild (CMS-HCC) F33.0   7/19/2015 - Present    Right hip pain M25.551   9/15/2015 - Present    Drug-induced hypokalemia E87.6   2/28/2016 - Present    Chronic respiratory failure with hypoxia (CMS-[facility]) J96.11   5/23/2016 - Present    Chronic, continuous use of opioids F11.90   11/28/2016 - Present    Potential for deficient knowledge of chronic obstructive pulmonary disease Z91.89   12/1/2016 - Present    Weight gain R63.5   1/6/2017 - Present      Health Maintenance        Date Due Completion Dates    RETINAL SCREENING 11/12/1967 ---    IMM DTaP/Tdap/Td Vaccine (1 - Tdap) 11/12/1968 ---    IMM ZOSTER VACCINE 11/12/2009 ---    DIABETES MONOFILAMENT / LE EXAM 3/18/2016 3/18/2015, 8/28/2014    IMM INFLUENZA (1) 9/1/2016 ---    A1C SCREENING 2/27/2017 8/27/2016, 2/19/2016, 9/9/2015, 6/22/2015, 6/16/2015, 3/3/2015, 3/3/2015, 7/18/2014, 8/8/2012    FASTING LIPID PROFILE 8/27/2017 8/27/2016, 2/19/2016, 9/9/2015, 6/16/2015, 3/3/2015, 3/3/2015, 9/10/2014    URINE ACR / MICROALBUMIN 8/27/2017 8/27/2016, 2/19/2016, 3/3/2015, 9/10/2014 (Done)    Override on 9/10/2014: Done     SERUM CREATININE 11/22/2017 11/22/2016, 9/7/2016, 8/27/2016, 2/19/2016, 9/9/2015, 6/16/2015, 3/3/2015, 3/3/2015, 9/10/2014, 7/23/2014, 7/20/2014, 7/19/2014, 7/19/2014, 7/18/2014, 7/18/2014, 7/18/2014, 8/8/2012, 6/11/2010, 7/16/2009, 5/1/2006, 4/22/2006, 2/15/2005    COLONOSCOPY 7/10/2019 7/10/2014    IMM PNEUMOCOCCAL 65+ (ADULT) LOW/MEDIUM RISK SERIES (2 of 2 - PPSV23) 7/23/2019 8/29/2016, 7/23/2014            Results     POCT Hemoglobin A1C      Component    Glycohemoglobin    6.0    Internal Control Negative    Negative    Internal Control Positive    Positive                        Current Immunizations     13-VALENT PCV PREVNAR 8/29/2016    Pneumococcal polysaccharide vaccine (PPSV-23) 7/23/2014  9:23 AM      Below and/or attached are the medications your provider expects you to take. Review all of your home medications and newly ordered medications with your provider and/or pharmacist. Follow medication instructions as directed by your provider and/or pharmacist. Please keep your medication list with you and share with your provider. Update the information when medications are discontinued, doses are changed, or new medications (including over-the-counter products) are added; and carry medication information at all times in the event of emergency situations     Allergies:  No Known Allergies          Medications  Valid as of: January 26, 2017 - 11:18 AM    Generic Name Brand Name Tablet Size Instructions for use    Albuterol Sulfate (Aero Soln) albuterol 108 (90 BASE) MCG/ACT Inhale 2 Puffs by mouth every 6 hours as needed.        ALPRAZolam (Tab) XANAX 0.25 MG Take 1 Tab by mouth at bedtime as needed for Sleep.        AmLODIPine Besylate (Tab) NORVASC 5 MG Take 1 Tab by mouth every day.        Aspirin (Tab)  MG Take 325 mg by mouth every day.        Carvedilol (Tab) COREG 3.125 MG TAKE 1 TABLET BY MOUTH TWICE DAILY        Fluticasone-Salmeterol (AEROSOL POWDER, BREATH ACTIVATED) ADVAIR 250-50 MCG/DOSE  Inhale 1 Puff by mouth every 12 hours.        Furosemide (Tab) LASIX 20 MG TAKE 1 TABLET BY MOUTH DAILY        Lisinopril (Tab) PRINIVIL 20 MG Take 1 Tab by mouth every day.        MetFORMIN HCl (Tab) GLUCOPHAGE 1000 MG TAKE 1 TABLET BY MOUTH TWICE DAILY WITH MEALS        Nicotine   Apply  to skin as directed.        Omeprazole (CAPSULE DELAYED RELEASE) PRILOSEC 20 MG Take 20 mg by mouth 2 Times a Day.        Ondansetron (TABLET DISPERSIBLE) ZOFRAN ODT 4 MG Take 1 Tab by mouth every 8 hours as needed for Nausea/Vomiting.        Oxycodone-Acetaminophen (Tab) PERCOCET 7.5-325 MG Take 1 Tab by mouth every 6 hours as needed for Severe Pain. May refill 3/23/17        Potassium Chloride Abi CR (Tab CR) Kdur 20 MEQ TAKE 1 TABLET BY MOUTH EVERY DAY        Pravastatin Sodium (Tab) PRAVACHOL 10 MG TAKE 1 TABLET BY MOUTH EVERY DAY        Sertraline HCl (Tab) ZOLOFT 50 MG TAKE 1 TABLET BY MOUTH DAILY        Tamsulosin HCl (Cap) FLOMAX 0.4 MG TAKE 1 CAPSULE BY MOUTH EVERY DAY        Tiotropium Bromide Monohydrate (Cap) SPIRIVA 18 MCG INHALE 1 CAPSULE BY MOUTH VIA HANDIHALER EVERY DAY        .                 Medicines prescribed today were sent to:     [facility] [address] [place] [address] [address] [facility] [address]    Phone: [phone] Fax: [phone]    Open 24 Hours?: Yes      Medication refill instructions:       If your prescription bottle indicates you have medication refills left, it is not necessary to call your provider’s office. Please contact your pharmacy and they will refill your medication.    If your prescription bottle indicates you do not have any refills left, you may request refills at any time through one of the following ways: The online [employer] system (except Urgent Care), by calling your provider’s office, or by asking your pharmacy to contact your provider’s office with a refill request. Medication refills are processed only during regular  business hours and may not be available until the next business day. Your provider may request additional information or to have a follow-up visit with you prior to refilling your medication.   *Please Note: Medication refills are assigned a new Rx number when refilled electronically. Your pharmacy may indicate that no refills were authorized even though a new prescription for the same medication is available at the pharmacy. Please request the medicine by name with the pharmacy before contacting your provider for a refill.        Other Notes About Your Plan     Enrolled in [employer] Team with Dr. Reynolds.           Ema Status: Patient Declined

## 2017-01-26 NOTE — ASSESSMENT & PLAN NOTE
Patient had an ultrasound of the abdomen which showed no ascites.  Patient states he is no longer drinking alcohol.

## 2017-01-26 NOTE — ASSESSMENT & PLAN NOTE
Patient is tolerating metformin 1000 mg twice a day, pravastatin 10 mg daily, lisinopril 10 mg daily, aspirin 325 mg daily.  A1c today in clinic is 6.0.

## 2017-01-27 ENCOUNTER — PATIENT OUTREACH (OUTPATIENT)
Dept: HEALTH INFORMATION MANAGEMENT | Facility: OTHER | Age: 67
End: 2017-01-27

## 2017-01-27 NOTE — PROGRESS NOTES
Outreach call to return message left by patient. Patient states he did have follow-up appointment with PCP yesterday. States his lisinopril was increased to 20 mg daily. Patient reports he will continue to monitor B/P readings. States he recently had his prescription refilled for the 10 mg lisinopril tablets. Patient reports he will take two of the 10 mg tablets of lisinopril. CC will continue to monitor RHM readings.

## 2017-02-09 ENCOUNTER — PATIENT OUTREACH (OUTPATIENT)
Dept: HEALTH INFORMATION MANAGEMENT | Facility: OTHER | Age: 67
End: 2017-02-09

## 2017-02-09 DIAGNOSIS — N40.0 BENIGN PROSTATIC HYPERPLASIA, PRESENCE OF LOWER URINARY TRACT SYMPTOMS UNSPECIFIED, UNSPECIFIED MORPHOLOGY: ICD-10-CM

## 2017-02-09 RX ORDER — TAMSULOSIN HYDROCHLORIDE 0.4 MG/1
0.4 CAPSULE ORAL
Qty: 90 CAP | Refills: 3 | Status: SHIPPED | OUTPATIENT
Start: 2017-02-09 | End: 2017-12-27 | Stop reason: SDUPTHER

## 2017-02-09 RX ORDER — ALPRAZOLAM 0.25 MG/1
TABLET ORAL
Qty: 90 TAB | Refills: 0
Start: 2017-02-09

## 2017-02-09 RX ORDER — TAMSULOSIN HYDROCHLORIDE 0.4 MG/1
CAPSULE ORAL
Qty: 90 CAP | Refills: 3 | Status: SHIPPED | OUTPATIENT
Start: 2017-02-09 | End: 2017-04-06

## 2017-02-09 NOTE — TELEPHONE ENCOUNTER
Was the patient seen in the last year in this department? Yes     Does patient have an active prescription for medications requested? No     Received Request Via: Pharmacy       Last visit:1/26/17    Last labs:1/6/17      I do not have  for the last fill date.

## 2017-02-09 NOTE — PROGRESS NOTES
CC outreach call. Alert received on RHM equipment. B/P transmitted at 146/87. CC tried to contact patient at home number. No answer, vm has not been set up. CC left message on cell phone requesting return call to [phone].

## 2017-02-09 NOTE — TELEPHONE ENCOUNTER
Please notify patient because he is on Percocet we cannot give him a prescription for alprazolam. It has now been recommended of these medications not be prescribed together. If he needs something for sleep, we can try trazodone. Please let me know what he thinks.  Marlo Reynolds M.D.

## 2017-02-09 NOTE — TELEPHONE ENCOUNTER
Was the patient seen in the last year in this department? Yes 2weeks ago Marlo Slots    Does patient have an active prescription for medications requested? No     Received Request Via: Pharmacy

## 2017-02-10 ENCOUNTER — PATIENT OUTREACH (OUTPATIENT)
Dept: HEALTH INFORMATION MANAGEMENT | Facility: OTHER | Age: 67
End: 2017-02-10

## 2017-02-11 NOTE — PROGRESS NOTES
Outreach call. No [facility] reading received today. Patient reports he slept in. States he is feeling okay. Reports he will check a reading later today.

## 2017-02-13 ENCOUNTER — PATIENT OUTREACH (OUTPATIENT)
Dept: HEALTH INFORMATION MANAGEMENT | Facility: OTHER | Age: 67
End: 2017-02-13

## 2017-02-13 NOTE — PROGRESS NOTES
"CC received RHM readings. Patient had visit with PCP on 1/26/17 and blood pressure medications adjusted.   CC spoke to patient and reviewed current blood pressure medications. Reports he is taking lisinopril 20 mg daily, and amlodipine 5 mg daily. Reports he has cut \"way back\" on smoking. States he is feeling pretty good today.  Reports no shortness of breath.         "

## 2017-02-23 ENCOUNTER — PATIENT OUTREACH (OUTPATIENT)
Dept: HEALTH INFORMATION MANAGEMENT | Facility: OTHER | Age: 67
End: 2017-02-23

## 2017-02-24 ENCOUNTER — PATIENT OUTREACH (OUTPATIENT)
Dept: HEALTH INFORMATION MANAGEMENT | Facility: OTHER | Age: 67
End: 2017-02-24

## 2017-02-24 NOTE — PROGRESS NOTES
CC outreach call. No RHM readings received today. B/P readings have been alerting past 4 days. See readings below. Patient had B/P medications adjusted at PCP visit on 1/26/17. Next PCP visit scheduled for 3/6/17. CC requested patient return call to CC at [phone].

## 2017-02-27 ENCOUNTER — PATIENT OUTREACH (OUTPATIENT)
Dept: HEALTH INFORMATION MANAGEMENT | Facility: OTHER | Age: 67
End: 2017-02-27

## 2017-02-27 NOTE — PROGRESS NOTES
"RN Care Coordinator outreach call to patient. Alerts received on RHM program. Blood pressure readings elevated. Refer to readings below. Patient reports he is feeling fine. States no chest pain, no shortness of breath, no headache. Reports he is taking medications as directed. Reports he resumed smoking a few days ago \"because I was stressed\". Reports he will continue to try and work on smoking cessation. Will increase his activity as tolerated and if weather permits.  CC discussed all follow-up appointments including pulmonary on 3/1/17 and PCP 3/6/17. Patient reports he will discuss his blood pressure readings with Dr. Reynolds at upcoming appointment.       "

## 2017-03-01 ENCOUNTER — APPOINTMENT (OUTPATIENT)
Dept: PULMONOLOGY | Facility: HOSPICE | Age: 67
End: 2017-03-01
Payer: MEDICARE

## 2017-03-01 ENCOUNTER — PATIENT OUTREACH (OUTPATIENT)
Dept: HEALTH INFORMATION MANAGEMENT | Facility: OTHER | Age: 67
End: 2017-03-01

## 2017-03-01 NOTE — PROGRESS NOTES
CC outreach call. Alert received on RHM program. B/P 180/85, rechecked and reading 146/83. Message left for patient to return CC call at [phone].

## 2017-03-06 ENCOUNTER — PATIENT OUTREACH (OUTPATIENT)
Dept: HEALTH INFORMATION MANAGEMENT | Facility: OTHER | Age: 67
End: 2017-03-06

## 2017-03-06 ENCOUNTER — TELEPHONE (OUTPATIENT)
Dept: HEALTH INFORMATION MANAGEMENT | Facility: OTHER | Age: 67
End: 2017-03-06

## 2017-03-06 ENCOUNTER — OFFICE VISIT (OUTPATIENT)
Dept: MEDICAL GROUP | Facility: MEDICAL CENTER | Age: 67
End: 2017-03-06
Payer: MEDICARE

## 2017-03-06 VITALS
WEIGHT: 169.2 LBS | BODY MASS INDEX: 24.22 KG/M2 | SYSTOLIC BLOOD PRESSURE: 112 MMHG | TEMPERATURE: 97.5 F | RESPIRATION RATE: 16 BRPM | OXYGEN SATURATION: 97 % | DIASTOLIC BLOOD PRESSURE: 62 MMHG | HEART RATE: 115 BPM | HEIGHT: 70 IN

## 2017-03-06 DIAGNOSIS — M54.50 CHRONIC MIDLINE LOW BACK PAIN WITHOUT SCIATICA: ICD-10-CM

## 2017-03-06 DIAGNOSIS — I10 ESSENTIAL HYPERTENSION: Chronic | ICD-10-CM

## 2017-03-06 DIAGNOSIS — Z79.891 CHRONIC USE OF OPIATE FOR THERAPEUTIC PURPOSE: ICD-10-CM

## 2017-03-06 DIAGNOSIS — E11.9 CONTROLLED TYPE 2 DIABETES MELLITUS WITHOUT COMPLICATION, WITHOUT LONG-TERM CURRENT USE OF INSULIN (HCC): ICD-10-CM

## 2017-03-06 DIAGNOSIS — G89.29 CHRONIC MIDLINE LOW BACK PAIN WITHOUT SCIATICA: ICD-10-CM

## 2017-03-06 DIAGNOSIS — J44.9 CHRONIC OBSTRUCTIVE PULMONARY DISEASE, UNSPECIFIED COPD TYPE (HCC): Chronic | ICD-10-CM

## 2017-03-06 PROCEDURE — 3017F COLORECTAL CA SCREEN DOC REV: CPT | Performed by: FAMILY MEDICINE

## 2017-03-06 PROCEDURE — G8484 FLU IMMUNIZE NO ADMIN: HCPCS | Performed by: FAMILY MEDICINE

## 2017-03-06 PROCEDURE — 99214 OFFICE O/P EST MOD 30 MIN: CPT | Performed by: FAMILY MEDICINE

## 2017-03-06 PROCEDURE — G8420 CALC BMI NORM PARAMETERS: HCPCS | Performed by: FAMILY MEDICINE

## 2017-03-06 PROCEDURE — 4004F PT TOBACCO SCREEN RCVD TLK: CPT | Performed by: FAMILY MEDICINE

## 2017-03-06 PROCEDURE — G8432 DEP SCR NOT DOC, RNG: HCPCS | Performed by: FAMILY MEDICINE

## 2017-03-06 PROCEDURE — 3044F HG A1C LEVEL LT 7.0%: CPT | Performed by: FAMILY MEDICINE

## 2017-03-06 PROCEDURE — 1101F PT FALLS ASSESS-DOCD LE1/YR: CPT | Performed by: FAMILY MEDICINE

## 2017-03-06 PROCEDURE — 4040F PNEUMOC VAC/ADMIN/RCVD: CPT | Performed by: FAMILY MEDICINE

## 2017-03-06 ASSESSMENT — LIFESTYLE VARIABLES: HISTORY_ALCOHOL_USE: 1

## 2017-03-06 ASSESSMENT — ENCOUNTER SYMPTOMS: DEPRESSION: 1

## 2017-03-06 NOTE — ASSESSMENT & PLAN NOTE
Chronic back pain. Patient has known wedge fracture in Thoracic spine, recently seen on x-ray.  Current pain control: good, 4/10  Adverse effects: none.  Physical functioning: good  Mood: fair  Family and social relationships: fair  Sleep pattern: good  Overall functioning: fair  Nonnarcotic treatments that are being used: nothing right now, has tried topical OTC pain relievers    Pain management agreement initiated and signed on: June 2016  Last dose of narcotic medication: 8 am this morning  Most recent urine drug screen done June 2016, which was reviewed today and is consistent with prescribed medications without any concerns.  Aberrant Behaviors: None  I have reviewed the medical records, the Prescription Monitoring Program and I have determined that percocet 7.5, 4 times daily, is medically indicated.    I have advised patient to keep medication in a safe place and to not drive with medication.

## 2017-03-06 NOTE — MR AVS SNAPSHOT
"Mumtaz Carney   3/6/2017 1:00 PM   Office Visit   MRN: [id number]    Department:  [facility]   Dept Phone:  [phone]    Description:  Male : 1950   Provider:  Marlo Reynolds M.D.           Reason for Visit     Follow-Up           Allergies as of 3/6/2017     No Known Allergies      You were diagnosed with     Essential hypertension   [0638907]       Chronic obstructive pulmonary disease, unspecified COPD type (CMS-HCC)   [9221594]       Controlled type 2 diabetes mellitus without complication, without long-term current use of insulin (CMS-HCC)   [0870898]       Chronic use of opiate for therapeutic purpose   [2720677]       Chronic midline low back pain without sciatica   [2839419]         Vital Signs     Blood Pressure Pulse Temperature Respirations Height Weight    112/62 mmHg 115 36.4 °C (97.5 °F) 16 1.778 m (5' 10\") 76.749 kg (169 lb 3.2 oz)    Body Mass Index Oxygen Saturation Smoking Status             24.28 kg/m2 97% Current Some Day Smoker         Basic Information     Date Of Birth Sex Race Ethnicity Preferred Language    1950 Male White Non- English      Your appointments     2017  1:20 PM   Established Patient with Marlo Reynolds M.D.   [facility])    [address]  [facility] [id number] [phone]           You will be receiving a confirmation call a few days before your appointment from our automated call confirmation system.            2017 10:00 AM   Pulmonary Function Test with PFT-RM1   [facility] (--)    [address]  [address]  [place] [id number] [phone]            2017 11:00 AM   Established Patient Pul with Osei Garcia M.D.   [facility] Medicine (--)    [address]  [address]  [facility] [id number] [phone]              Problem List              ICD-10-CM Priority Class Noted - Resolved    COPD (chronic obstructive " pulmonary disease) (CMS-HCC) (Chronic) J44.9   6/11/2010 - Present    Tobacco abuse (Chronic) Z72.0   6/11/2010 - Present    HTN (hypertension) (Chronic) I10   6/11/2010 - Present    Hx of drug abuse Z87.898   12/17/2013 - Present    Elevated liver enzymes R74.8   12/17/2013 - Present    BPH (benign prostatic hyperplasia) N40.0   12/17/2013 - Present    Hepatitis C B19.20   1/16/2014 - Present    Cirrhosis (CMS-HCC) K74.60   7/18/2014 - Present    Diabetes mellitus type 2, controlled (CMS-HCC) E11.9   7/29/2014 - Present    Insomnia G47.00   8/21/2014 - Present    Chronic lower back pain M54.5, G89.29   8/28/2014 - Present    Hyperlipidemia E78.5   3/18/2015 - Present    MDD (major depressive disorder), recurrent episode, mild (CMS-HCC) F33.0   7/19/2015 - Present    Right hip pain M25.551   9/15/2015 - Present    Drug-induced hypokalemia E87.6   2/28/2016 - Present    Chronic respiratory failure with hypoxia (CMS-[facility]) J96.11   5/23/2016 - Present    Chronic use of opiate for therapeutic purpose Z79.899   11/28/2016 - Present    Potential for deficient knowledge of chronic obstructive pulmonary disease Z91.89   12/1/2016 - Present    Weight gain R63.5   1/6/2017 - Present      Health Maintenance        Date Due Completion Dates    RETINAL SCREENING 11/12/1967 ---    IMM DTaP/Tdap/Td Vaccine (1 - Tdap) 11/12/1968 ---    IMM ZOSTER VACCINE 11/12/2009 ---    DIABETES MONOFILAMENT / LE EXAM 3/18/2016 3/18/2015, 8/28/2014    IMM INFLUENZA (1) 9/1/2016 ---    A1C SCREENING 7/26/2017 1/26/2017, 8/27/2016, 2/19/2016, 9/9/2015, 6/22/2015, 6/16/2015, 3/3/2015, 3/3/2015, 7/18/2014, 8/8/2012    FASTING LIPID PROFILE 8/27/2017 8/27/2016, 2/19/2016, 9/9/2015, 6/16/2015, 3/3/2015, 3/3/2015, 9/10/2014    URINE ACR / MICROALBUMIN 8/27/2017 8/27/2016, 2/19/2016, 3/3/2015, 9/10/2014 (Done)    Override on 9/10/2014: Done    SERUM CREATININE 11/22/2017 11/22/2016, 9/7/2016, 8/27/2016, 2/19/2016, 9/9/2015, 6/16/2015, 3/3/2015, 3/3/2015,  9/10/2014, 7/23/2014, 7/20/2014, 7/19/2014, 7/19/2014, 7/18/2014, 7/18/2014, 7/18/2014, 8/8/2012, 6/11/2010, 7/16/2009, 5/1/2006, 4/22/2006, 2/15/2005    COLONOSCOPY 7/10/2019 7/10/2014    IMM PNEUMOCOCCAL 65+ (ADULT) LOW/MEDIUM RISK SERIES (2 of 2 - PPSV23) 7/23/2019 8/29/2016, 7/23/2014            Current Immunizations     13-VALENT PCV PREVNAR 8/29/2016    Pneumococcal polysaccharide vaccine (PPSV-23) 7/23/2014  9:23 AM      Below and/or attached are the medications your provider expects you to take. Review all of your home medications and newly ordered medications with your provider and/or pharmacist. Follow medication instructions as directed by your provider and/or pharmacist. Please keep your medication list with you and share with your provider. Update the information when medications are discontinued, doses are changed, or new medications (including over-the-counter products) are added; and carry medication information at all times in the event of emergency situations     Allergies:  No Known Allergies          Medications  Valid as of: March 06, 2017 -  1:39 PM    Generic Name Brand Name Tablet Size Instructions for use    Albuterol Sulfate (Aero Soln) albuterol 108 (90 BASE) MCG/ACT Inhale 2 Puffs by mouth every 6 hours as needed.        AmLODIPine Besylate (Tab) NORVASC 5 MG Take 1 Tab by mouth every day.        Aspirin (Tab)  MG Take 325 mg by mouth every day.        Carvedilol (Tab) COREG 3.125 MG TAKE 1 TABLET BY MOUTH TWICE DAILY        Fluticasone-Salmeterol (AEROSOL POWDER, BREATH ACTIVATED) ADVAIR 250-50 MCG/DOSE Inhale 1 Puff by mouth every 12 hours.        Furosemide (Tab) LASIX 20 MG TAKE 1 TABLET BY MOUTH DAILY        Lisinopril (Tab) PRINIVIL 20 MG Take 1 Tab by mouth every day.        MetFORMIN HCl (Tab) GLUCOPHAGE 1000 MG TAKE 1 TABLET BY MOUTH TWICE DAILY WITH MEALS        Nicotine   Apply  to skin as directed.        Omeprazole (CAPSULE DELAYED RELEASE) PRILOSEC 20 MG Take 20 mg by  mouth 2 Times a Day.        Ondansetron (TABLET DISPERSIBLE) ZOFRAN ODT 4 MG Take 1 Tab by mouth every 8 hours as needed for Nausea/Vomiting.        Oxycodone-Acetaminophen (Tab) PERCOCET 7.5-325 MG Take 1 Tab by mouth every 6 hours as needed for Severe Pain. May refill 3/23/17        Potassium Chloride Abi CR (Tab CR) Kdur 20 MEQ TAKE 1 TABLET BY MOUTH EVERY DAY        Pravastatin Sodium (Tab) PRAVACHOL 10 MG TAKE 1 TABLET BY MOUTH EVERY DAY        Sertraline HCl (Tab) ZOLOFT 50 MG TAKE 1 TABLET BY MOUTH DAILY        Tamsulosin HCl (Cap) FLOMAX 0.4 MG TAKE 1 CAPSULE BY MOUTH EVERY DAY        Tamsulosin HCl (Cap) FLOMAX 0.4 MG Take 1 Cap by mouth every day.        Tiotropium Bromide Monohydrate (Cap) SPIRIVA 18 MCG INHALE 1 CAPSULE BY MOUTH VIA HANDIHALER EVERY DAY        .                 Medicines prescribed today were sent to:     [facility] [address] [place] [address] [address] [address] [address] [facility] [address]    Phone: [phone] Fax: [phone]    Open 24 Hours?: Yes      Medication refill instructions:       If your prescription bottle indicates you have medication refills left, it is not necessary to call your provider’s office. Please contact your pharmacy and they will refill your medication.    If your prescription bottle indicates you do not have any refills left, you may request refills at any time through one of the following ways: The online [employer] system (except Urgent Care), by calling your provider’s office, or by asking your pharmacy to contact your provider’s office with a refill request. Medication refills are processed only during regular business hours and may not be available until the next business day. Your provider may request additional information or to have a follow-up visit with you prior to refilling your medication.   *Please Note: Medication refills are assigned a new Rx number when refilled electronically. Your pharmacy may indicate  that no refills were authorized even though a new prescription for the same medication is available at the pharmacy. Please request the medicine by name with the pharmacy before contacting your provider for a refill.        Your To Do List     Future Labs/Procedures Complete By [employer] [facility]IN MANAGEMENT SCREEN  As directed 3/6/2018    Comments:    Current Meds (name, sig, last dose):   Current outpatient prescriptions:   •  tamsulosin, TAKE 1 CAPSULE BY MOUTH EVERY DAY  •  tamsulosin, 0.4 mg, Oral, QDAY  •  lisinopril, 20 mg, Oral, QDAY  •  amlodipine, 5 mg, Oral, DAILY  •  oxycodone-acetaminophen, 1 Tab, Oral, Q6HRS PRN  •  aspirin, 325 mg, Oral, DAILY  •  NICOTINE STEP 2 TD, Apply  to skin as directed.  •  ondansetron, 4 mg, Oral, Q8HRS PRN  •  furosemide, TAKE 1 TABLET BY MOUTH DAILY,  at am  •  metformin, TAKE 1 TABLET BY MOUTH TWICE DAILY WITH MEALS,  at 0630  •  fluticasone-salmeterol, 1 Puff, Inhalation, Q12HRS,  at 0630  •  pravastatin, TAKE 1 TABLET BY MOUTH EVERY DAY,  at 0630  •  carvedilol, TAKE 1 TABLET BY MOUTH TWICE DAILY,  at 0630  •  albuterol, 2 Puff, Inhalation, Q6HRS PRN,  at 0600  •  tiotropium, INHALE 1 CAPSULE BY MOUTH VIA HANDIHALER EVERY DAY,  at 0630  •  potassium chloride SA, TAKE 1 TABLET BY MOUTH EVERY DAY,  at 0630  •  sertraline, TAKE 1 TABLET BY MOUTH DAILY,  at 0630  •  omeprazole, 20 mg, Oral, BID,  at 0630            Other Notes About Your Plan     Enrolled in R-Med Team with Dr. Reynolds.           Ema Status: Patient Declined

## 2017-03-06 NOTE — PROGRESS NOTES
Patient seen at PCP office today for follow-up. B/P reading at PCP office 112/62. Readings have been transmitting high through [employer] RHM program.  CC sent ticket to [employer] regarding B/P and accuracy of equipment. CC will have patient monitor his pulse oximeter as he is being followed for COPD. CC will have [employer] contact patient regarding B/P equipment and patient will have his B/P monitored at PCP appointments. CC will update patient on plan.

## 2017-03-06 NOTE — PROGRESS NOTES
CC outreach call. Alerts transmitted on RHM program with elevated B/P readings. CC left another message for patient to return CC call. Patient noted on PCP schedule for today at 1:00. CC will forward readings to PCP for review.

## 2017-03-06 NOTE — PROGRESS NOTES
Subjective:   Mumtaz Carney is a 66 y.o. male here today for hypertension, back pain    Chronic lower back pain  Chronic back pain. Patient has known wedge fracture in Thoracic spine, recently seen on x-ray.  Current pain control: good, 4/10  Adverse effects: none.  Physical functioning: good  Mood: fair  Family and social relationships: fair  Sleep pattern: good  Overall functioning: fair  Nonnarcotic treatments that are being used: nothing right now, has tried topical OTC pain relievers    Pain management agreement initiated and signed on: June 2016  Last dose of narcotic medication: 8 am this morning  Most recent urine drug screen done June 2016, which was reviewed today and is consistent with prescribed medications without any concerns.  Aberrant Behaviors: None  I have reviewed the medical records, the Prescription Monitoring Program and I have determined that percocet 7.5, 4 times daily, is medically indicated.    I have advised patient to keep medication in a safe place and to not drive with medication.    COPD (chronic obstructive pulmonary disease)  Patient is on 5 L nasal cannula oxygen.  He is tolerating Spiriva and Advair inhalers.    Diabetes mellitus type 2, controlled  Patient is due for retinal eye exam today. He has been tolerating metformin 1000 mg twice a day.    HTN (hypertension)  Patient has been using home monitoring equipment and his blood pressure reading has been 160s over 90s. We increased his lisinopril from 10 mg 20 mg approximately 1.5 months ago.  His blood pressure here in our clinic has been low normal. I personally rechecked his blood pressure today and got a reading of 108/52.  He denies any dizziness or lightheadedness.         Current medicines (including changes today)  Current Outpatient Prescriptions   Medication Sig Dispense Refill   • tamsulosin (FLOMAX) 0.4 MG capsule TAKE 1 CAPSULE BY MOUTH EVERY DAY 90 Cap 3   • tamsulosin (FLOMAX) 0.4 MG capsule Take 1 Cap by  mouth every day. 90 Cap 3   • lisinopril (PRINIVIL) 20 MG Tab Take 1 Tab by mouth every day. 90 Tab 3   • amlodipine (NORVASC) 5 MG Tab Take 1 Tab by mouth every day. 90 Tab 0   • oxycodone-acetaminophen (PERCOCET) 7.5-325 MG per tablet Take 1 Tab by mouth every 6 hours as needed for Severe Pain. May refill 3/23/17 120 Tab 0   • aspirin (ASA) 325 MG Tab Take 325 mg by mouth every day.     • NICOTINE STEP 2 TD Apply  to skin as directed.     • ondansetron (ZOFRAN ODT) 4 MG TABLET DISPERSIBLE Take 1 Tab by mouth every 8 hours as needed for Nausea/Vomiting. 10 Tab 0   • furosemide (LASIX) 20 MG Tab TAKE 1 TABLET BY MOUTH DAILY 90 Tab 3   • metformin (GLUCOPHAGE) 1000 MG tablet TAKE 1 TABLET BY MOUTH TWICE DAILY WITH MEALS 180 Tab 1   • fluticasone-salmeterol (ADVAIR) 250-50 MCG/DOSE AEROSOL POWDER, BREATH ACTIVATED Inhale 1 Puff by mouth every 12 hours. 1 Inhaler 11   • pravastatin (PRAVACHOL) 10 MG Tab TAKE 1 TABLET BY MOUTH EVERY DAY 90 Tab 3   • carvedilol (COREG) 3.125 MG Tab TAKE 1 TABLET BY MOUTH TWICE DAILY 180 Tab 3   • albuterol (VENTOLIN OR PROVENTIL) 108 (90 BASE) MCG/ACT Aero Soln inhalation aerosol Inhale 2 Puffs by mouth every 6 hours as needed. 8.5 g 6   • tiotropium (SPIRIVA HANDIHALER) 18 MCG Cap INHALE 1 CAPSULE BY MOUTH VIA HANDIHALER EVERY DAY 90 Cap 3   • potassium chloride SA (K-DUR) 20 MEQ Tab CR TAKE 1 TABLET BY MOUTH EVERY DAY 90 Tab 3   • sertraline (ZOLOFT) 50 MG Tab TAKE 1 TABLET BY MOUTH DAILY 90 Tab 3   • omeprazole (PRILOSEC) 20 MG CPDR Take 20 mg by mouth 2 Times a Day.       No current facility-administered medications for this visit.     He  has a past medical history of Aortic stenosis; Wound; Hypertension; Bronchitis; COPD; CHF (congestive heart failure) (CMS-[facility]) (6/11/2010); Tobacco abuse (6/11/2010); COPD (chronic obstructive pulmonary disease) (CMS-HCC); Severe aortic stenosis (12/17/2013); Hepatitis C (1/16/2014); Other specified disorder of intestines; Indigestion; Heart burn;  "Other emphysema (CMS-HCC); Breath shortness; Psychiatric problem; MRSA (methicillin resistant Staphylococcus aureus); and Cirrhosis (CMS-HCC) (2014). He also has no past medical history of Unspecified hemorrhagic conditions (CMS-HCC), Personal history of venous thrombosis and embolism, Cancer (CMS-HCC), Diabetes, Unspecified disorder of thyroid, Glaucoma, CATARACT, Stroke (CMS-HCC), Seizure (CMS-HCC), Myocardial infarct (CMS-HCC), Arrhythmia, Pacemaker, Angina, Heart murmur, Rheumatic fever, ASTHMA, Pneumonia, Jaundice, Unspecified urinary incontinence, Renal disorder, Dialysis, Other specified symptom associated with female genital organs, Arthritis, or Backpain.    ROS   No chest pain, no shortness of breath     Objective:     Blood pressure 112/62, pulse 115, temperature 36.4 °C (97.5 °F), resp. rate 16, height 1.778 m (5' 10\"), weight 76.749 kg (169 lb 3.2 oz), SpO2 97 %. Body mass index is 24.28 kg/(m^2).   Physical Exam:  Constitutional: Alert, no distress.  Skin: Warm, dry, good turgor, no rashes in visible areas.  Eye: Equal, round and reactive, conjunctiva clear, lids normal.  Psych: Alert and oriented x3, normal affect and mood.        Assessment and Plan:   The following treatment plan was discussed    1. Essential hypertension  Low normal based on our readings in clinic. We will look into checking the accuracy of his home equipment.  Continue current medications.    2. Chronic obstructive pulmonary disease, unspecified COPD type (CMS-HCC)  Stable. Continue current medications.    3. Controlled type 2 diabetes mellitus without complication, without long-term current use of insulin (CMS-HCC)  We could not perform retinal eye exam today because of computer error. Patient will follow-up in 2 months.    4. Chronic use of opiate for therapeutic purpose  UDS done today. Patient will follow-up in 2 months for additional refills of pain medication.  - [facility] PAIN MANAGEMENT SCREEN; Future    5. Chronic " midline low back pain without sciatica  UDS done today. Patient will follow-up in 2 months for additional refills of pain medication.  If there are any inconsistencies on his UDS, we will refer him to pain management.  - [facility] PAIN MANAGEMENT SCREEN; Future      Followup: Return in about 2 months (around 5/6/2017) for Pain medication refill, Multiple issues, Long.

## 2017-03-06 NOTE — ASSESSMENT & PLAN NOTE
Patient has been using home monitoring equipment and his blood pressure reading has been 160s over 90s. We increased his lisinopril from 10 mg 20 mg approximately 1.5 months ago.  His blood pressure here in our clinic has been low normal. I personally rechecked his blood pressure today and got a reading of 108/52.  He denies any dizziness or lightheadedness.

## 2017-03-07 ENCOUNTER — PATIENT OUTREACH (OUTPATIENT)
Dept: HEALTH INFORMATION MANAGEMENT | Facility: OTHER | Age: 67
End: 2017-03-07

## 2017-03-07 NOTE — PROGRESS NOTES
CC outreach call to patient. RHM vitals received. B/P transmitted at 137/80, pulse oximeter reading 96%. Patient has been on RHM program for almost 90 days. Spoke to patient about discontinuing program. Patient was being followed for COPD. No recent hospitalizations. Pulse oximeter readings in good range. Patient reports he is compliant wearing oxygen. Patient will have his blood pressure monitored at PCP appointments. Plan on ending RHM program on 3/9/17. Patient in agreement with plan. CC sent ticket to Iv[employer].

## 2017-03-16 ENCOUNTER — PATIENT OUTREACH (OUTPATIENT)
Dept: HEALTH INFORMATION MANAGEMENT | Facility: OTHER | Age: 67
End: 2017-03-16

## 2017-03-29 ENCOUNTER — OFFICE VISIT (OUTPATIENT)
Dept: PULMONOLOGY | Facility: HOSPICE | Age: 67
End: 2017-03-29
Payer: MEDICARE

## 2017-03-29 ENCOUNTER — NON-PROVIDER VISIT (OUTPATIENT)
Dept: PULMONOLOGY | Facility: HOSPICE | Age: 67
End: 2017-03-29
Payer: MEDICARE

## 2017-03-29 VITALS
DIASTOLIC BLOOD PRESSURE: 74 MMHG | OXYGEN SATURATION: 92 % | RESPIRATION RATE: 16 BRPM | TEMPERATURE: 97.9 F | SYSTOLIC BLOOD PRESSURE: 122 MMHG | HEART RATE: 101 BPM | HEIGHT: 70 IN | WEIGHT: 169 LBS | BODY MASS INDEX: 24.2 KG/M2

## 2017-03-29 DIAGNOSIS — J44.9 CHRONIC OBSTRUCTIVE PULMONARY DISEASE, UNSPECIFIED COPD TYPE (HCC): Chronic | ICD-10-CM

## 2017-03-29 DIAGNOSIS — J44.1 COPD WITH EXACERBATION (HCC): ICD-10-CM

## 2017-03-29 PROCEDURE — 94060 EVALUATION OF WHEEZING: CPT | Performed by: INTERNAL MEDICINE

## 2017-03-29 PROCEDURE — 94729 DIFFUSING CAPACITY: CPT | Performed by: INTERNAL MEDICINE

## 2017-03-29 PROCEDURE — 94726 PLETHYSMOGRAPHY LUNG VOLUMES: CPT | Performed by: INTERNAL MEDICINE

## 2017-03-29 PROCEDURE — G8484 FLU IMMUNIZE NO ADMIN: HCPCS | Performed by: INTERNAL MEDICINE

## 2017-03-29 PROCEDURE — G8432 DEP SCR NOT DOC, RNG: HCPCS | Performed by: INTERNAL MEDICINE

## 2017-03-29 PROCEDURE — 4004F PT TOBACCO SCREEN RCVD TLK: CPT | Performed by: INTERNAL MEDICINE

## 2017-03-29 PROCEDURE — 99214 OFFICE O/P EST MOD 30 MIN: CPT | Mod: 25 | Performed by: INTERNAL MEDICINE

## 2017-03-29 PROCEDURE — 4040F PNEUMOC VAC/ADMIN/RCVD: CPT | Performed by: INTERNAL MEDICINE

## 2017-03-29 PROCEDURE — 1101F PT FALLS ASSESS-DOCD LE1/YR: CPT | Performed by: INTERNAL MEDICINE

## 2017-03-29 PROCEDURE — G8420 CALC BMI NORM PARAMETERS: HCPCS | Performed by: INTERNAL MEDICINE

## 2017-03-29 PROCEDURE — 3017F COLORECTAL CA SCREEN DOC REV: CPT | Performed by: INTERNAL MEDICINE

## 2017-03-29 RX ORDER — PREDNISONE 10 MG/1
TABLET ORAL
Qty: 40 TAB | Refills: 1 | Status: SHIPPED | OUTPATIENT
Start: 2017-03-29 | End: 2017-10-16

## 2017-03-29 RX ORDER — LISINOPRIL 10 MG/1
TABLET ORAL
Refills: 3 | COMMUNITY
Start: 2017-01-09 | End: 2017-04-06

## 2017-03-29 RX ORDER — BUDESONIDE AND FORMOTEROL FUMARATE DIHYDRATE 160; 4.5 UG/1; UG/1
2 AEROSOL RESPIRATORY (INHALATION) 2 TIMES DAILY
Qty: 1 INHALER | Refills: 11 | Status: SHIPPED | OUTPATIENT
Start: 2017-03-29 | End: 2017-10-16 | Stop reason: SDUPTHER

## 2017-03-29 RX ORDER — AZITHROMYCIN 250 MG/1
TABLET, FILM COATED ORAL
Qty: 6 TAB | Refills: 0 | Status: SHIPPED | OUTPATIENT
Start: 2017-03-29 | End: 2017-10-16 | Stop reason: SDUPTHER

## 2017-03-29 ASSESSMENT — PULMONARY FUNCTION TESTS
FEV1/FVC: 27
FEV1: .72
FVC_PERCENT_PREDICTED: 59
FVC: 2.69
FVC_PREDICTED: 4.52
FEV1_PREDICTED: 3.35
FVC_PERCENT_PREDICTED: 58
FEV1: .72
FEV1_PERCENT_PREDICTED: 21
FEV1_PERCENT_CHANGE: 0
FEV1/FVC_PERCENT_CHANGE: 0
FEV1/FVC_PERCENT_PREDICTED: 74
FEV1/FVC: 26.77
FEV1_PERCENT_CHANGE: 2
FEV1/FVC_PERCENT_PREDICTED: 36
FEV1/FVC_PERCENT_PREDICTED: 36
FVC: 2.63
FEV1_PERCENT_PREDICTED: 21

## 2017-03-29 NOTE — PROGRESS NOTES
Mumtaz Carney is a 66 y.o. male here for follow-up of COPD.    History of Present Illness:    The patient is a 66-year-old male who has COPD. He comes in for follow-up. He is having increased shortness of breath and some increased congestion today. He's had some yellow mucus production. Pulmonary function test were done today which showed his FEV1 to be 0.72 L which is 21% predicted. There was not a post bronchodilator response. There was significant hyperinflation and air trapping and the diffusion capacity tests is moderately reduced. The patient is trying to quit smoking but he still smoking some cigarettes. He is on Spiriva and albuterol inhaler. Last visit we had prescribed Advair but he says his insurance company did not cover it. We did a CT scan of the chest for colon cancer screening and showed no significant abnormalities although there may be a vascular malformation in the right upper lobe. The patient had an echocardiogram done in January 2017 which showed normal LV function and there was no significant pulmonary hypertension. Last visit we signed him up for pulmonary rehabilitation program but apparently they will not see anybody who is still actively smoking.    Constitutional:  Negative for fever, chills, sweats, and fatigue.  Eyes:  Negative for eye pain and visual changes.  HENT:  Negative for tinnitus and hoarse voice.  Cardiovascular:  Negative for chest pain, leg swelling, syncope and orthopnea.  Respiratory:  See HPI for pertinent negatives  Sleep:  Negative for somnolence, loud snoring, sleep disturbance due to breathing, insomnia.  Gastrointestinal:  Negative for dysphagia, nausea and abdominal pain.  Heme/lymph:  Denies easy bruising, blood clots.  Musculoskeletal:  Negative for arthralgias, sore muscles and back pain.  Skin:  Negative for rash and color change.  Neurological:  Negative for headaches, lightheadedness and weakness.  Psychiatric:  Denies depression.    Current Outpatient  Prescriptions   Medication Sig Dispense Refill   • budesonide-formoterol (SYMBICORT) 160-4.5 MCG/ACT Aerosol Inhale 2 Puffs by mouth 2 Times a Day. Use spacer. Rinse mouth after each use. 1 Inhaler 11   • predniSONE (DELTASONE) 10 MG Tab Take 40mg x 4 days, then take 30mg x 4 days, then take 20mg x 4 days, then 10mg x 4 days with food, then discontinue. 40 Tab 1   • azithromycin (ZITHROMAX) 250 MG Tab Take 2 tablets on day 1, then take 1 tablet a day for 4 days. 6 Tab 0   • lisinopril (PRINIVIL) 10 MG Tab TK 1 T PO QD  3   • sertraline (ZOLOFT) 50 MG Tab TAKE 1 TABLET BY MOUTH EVERY DAY 90 Tab 3   • tamsulosin (FLOMAX) 0.4 MG capsule TAKE 1 CAPSULE BY MOUTH EVERY DAY 90 Cap 3   • tamsulosin (FLOMAX) 0.4 MG capsule Take 1 Cap by mouth every day. 90 Cap 3   • lisinopril (PRINIVIL) 20 MG Tab Take 1 Tab by mouth every day. 90 Tab 3   • amlodipine (NORVASC) 5 MG Tab Take 1 Tab by mouth every day. 90 Tab 0   • oxycodone-acetaminophen (PERCOCET) 7.5-325 MG per tablet Take 1 Tab by mouth every 6 hours as needed for Severe Pain. May refill 3/23/17 120 Tab 0   • aspirin (ASA) 325 MG Tab Take 325 mg by mouth every day.     • NICOTINE STEP 2 TD Apply  to skin as directed.     • ondansetron (ZOFRAN ODT) 4 MG TABLET DISPERSIBLE Take 1 Tab by mouth every 8 hours as needed for Nausea/Vomiting. 10 Tab 0   • furosemide (LASIX) 20 MG Tab TAKE 1 TABLET BY MOUTH DAILY 90 Tab 3   • metformin (GLUCOPHAGE) 1000 MG tablet TAKE 1 TABLET BY MOUTH TWICE DAILY WITH MEALS 180 Tab 1   • fluticasone-salmeterol (ADVAIR) 250-50 MCG/DOSE AEROSOL POWDER, BREATH ACTIVATED Inhale 1 Puff by mouth every 12 hours. 1 Inhaler 11   • pravastatin (PRAVACHOL) 10 MG Tab TAKE 1 TABLET BY MOUTH EVERY DAY 90 Tab 3   • carvedilol (COREG) 3.125 MG Tab TAKE 1 TABLET BY MOUTH TWICE DAILY 180 Tab 3   • albuterol (VENTOLIN OR PROVENTIL) 108 (90 BASE) MCG/ACT Aero Soln inhalation aerosol Inhale 2 Puffs by mouth every 6 hours as needed. 8.5 g 6   • tiotropium (SPIRIVA  "HANDIHALER) 18 MCG Cap INHALE 1 CAPSULE BY MOUTH VIA HANDIHALER EVERY DAY 90 Cap 3   • potassium chloride SA (K-DUR) 20 MEQ Tab CR TAKE 1 TABLET BY MOUTH EVERY DAY 90 Tab 3   • omeprazole (PRILOSEC) 20 MG CPDR Take 20 mg by mouth 2 Times a Day.       No current facility-administered medications for this visit.       Social History   Substance Use Topics   • Smoking status: Current Some Day Smoker -- 0.25 packs/day     Types: Cigarettes     Last Attempt to Quit: 02/18/2014   • Smokeless tobacco: Never Used      Comment: ppd  1 pack per day   • Alcohol Use: No      Comment: quit \" 3 years ago\"       Past Medical History   Diagnosis Date   • Aortic stenosis    • Wound      left shoulder   • Hypertension    • Bronchitis    • COPD    • CHF (congestive heart failure) (CMS-HCC) 6/11/2010   • Tobacco abuse 6/11/2010   • COPD (chronic obstructive pulmonary disease) (CMS-HCC)    • Severe aortic stenosis 12/17/2013   • Hepatitis C 1/16/2014   • Other specified disorder of intestines    • Indigestion    • Heart burn    • Other emphysema (CMS-HCC)    • Breath shortness    • Psychiatric problem    • MRSA (methicillin resistant Staphylococcus aureus)    • Cirrhosis (CMS-HCC) 2014       Past Surgical History   Procedure Laterality Date   • Hernia rep inguinal     • Other orthopedic surgery       right knee   • Other       dental 2006   • Recovery  4/4/2014     Performed by [facility] at [facility]ERY [facility]       Allergies:  Review of patient's allergies indicates no known allergies.    Family History   Problem Relation Age of Onset   • Cancer Mother    • No Known Problems Father        Physical Examination    Filed Vitals:    03/29/17 1347   Height: 1.778 m (5' 10\")   Weight: 76.658 kg (169 lb)   Weight % change since last entry.: 0 %   BP: 122/74   Pulse: 101   BMI (Calculated): 24.25   Resp: 16   Temp: 36.6 °C (97.9 °F)   O2 sat % on O2: 92 %   O2 Flow Rate (L/min): 5       Physical Exam:  Constitutional:  Well " developed and well nourished.  Head:  Normocephalic and atraumatic.  Nose:  Nose normal.  Mouth/Throat:  Oropharynx is clear and moist, no lesions.    Neck:  Normal range of motion.  Supple.  No JVD.  Cardiovascular:  Normal rate, regular rhythm, normal heart sounds. No edema  Pulmonary/Chest: Moderate expiratory wheezing, no rales or rhonchi.  Respiratory effort non labored  Musculoskeletal.  No muscular atrophy.  Lymphadenopathy:  No cervical or supraclavicular adenopathy  Neurological:  Alert and oriented.  Cranial nerves intact.  No focal deficits  Skin:  No rashes or ulcers.  Psyciatric:  Normal mood and affect.      Assessment and Plan:  1. COPD with exacerbation (CMS-[facility])  The patient is having an active COPD exacerbation at this time. Pulmonary function tests show severe airflow obstruction but he is having significant wheezing today so I suspect this may be not his baseline. I have started him on prednisone 40 mg with a taper and azithromycin. He will continue with the Spiriva and albuterol inhalers. I have started him on Symbicort inhaler to be done twice a day and I have again encouraged patient to quit smoking. He's seems to want to quit. He is up-to-date with his pneumococcal vaccines. He is not a candidate for pulmonary rehabilitation at this time until he quit smoking. We will see him back in 2 months.  - budesonide-formoterol (SYMBICORT) 160-4.5 MCG/ACT Aerosol; Inhale 2 Puffs by mouth 2 Times a Day. Use spacer. Rinse mouth after each use.  Dispense: 1 Inhaler; Refill: 11  - predniSONE (DELTASONE) 10 MG Tab; Take 40mg x 4 days, then take 30mg x 4 days, then take 20mg x 4 days, then 10mg x 4 days with food, then discontinue.  Dispense: 40 Tab; Refill: 1  - azithromycin (ZITHROMAX) 250 MG Tab; Take 2 tablets on day 1, then take 1 tablet a day for 4 days.  Dispense: 6 Tab; Refill: 0      Follow-up in 2 months    Followup No Follow-up on file.

## 2017-03-29 NOTE — PROCEDURES
Good patient effort & cooperation.  Test was performed on the [employer] Body Plethysmograph- Elite DX system.  The DLCO was uncorrected for Hgb.  A bronchodilator of Ventolin HFA- 2puffs via spacer were administered.  ATS/ERS standards for acceptability & reproducibility were not met for FVC because of end of test criteria, but were repeatable.    1.  Spirometry shows very severe airflow obstruction without a significant improvement after bronchodilator  2.  Lung volumes show moderate hyperinflation and severe air trapping  3.  Diffusion capacity is moderately reduced  4.  Flow volume loops are consistent with severe airflow obstruction    Overall Impression: Severe obstructive lung disease without significant reactivity

## 2017-03-29 NOTE — PATIENT INSTRUCTIONS
1. We have prescribed prednisone 40 mg with a taper  2. We have prescribed azithromycin  3. Recommend continuing with Spiriva and using albuterol inhaler as needed  4. We have prescribed Symbicort to be used twice a day  5. Recommend continuing with oxygen therapy  6. Recommend continuing to work on smoking cessation  7. Recommend follow-up in 2 months

## 2017-03-29 NOTE — MR AVS SNAPSHOT
"Mumtaz Carney   3/29/2017 2:40 PM   Office Visit   MRN: [id number]    Department:  [facility]   Dept Phone:  [phone]    Description:  Male : 1950   Provider:  Osei Garcia M.D.           Reason for Visit     Results CT      Allergies as of 3/29/2017     No Known Allergies      You were diagnosed with     COPD with exacerbation (CMS-HCC)   [472533]         Vital Signs     Blood Pressure Pulse Temperature Respirations Height Weight    122/74 mmHg 101 36.6 °C (97.9 °F) 16 1.778 m (5' 10\") 76.658 kg (169 lb)    Body Mass Index Oxygen Saturation Smoking Status             24.25 kg/m2 92% Current Some Day Smoker         Basic Information     Date Of Birth Sex Race Ethnicity Preferred Language    1950 Male White Non- English      Your appointments     Mar 29, 2017  2:40 PM   Established Patient Pul with Osei Garcia M.D.   [facility] (--)    [address]  [address]  [place] [id number] [phone]            2017  1:20 PM   Established Patient with Marlo Reynolds M.D.   [facility] ([facility])    [address]  [place] [id number] [phone]           You will be receiving a confirmation call a few days before your appointment from our automated call confirmation system.              Problem List              ICD-10-CM Priority Class Noted - Resolved    COPD (chronic obstructive pulmonary disease) (CMS-HCC) (Chronic) J44.9   2010 - Present    Tobacco abuse (Chronic) Z72.0   2010 - Present    HTN (hypertension) (Chronic) I10   2010 - Present    Hx of drug abuse Z87.898   2013 - Present    Elevated liver enzymes R74.8   2013 - Present    BPH (benign prostatic hyperplasia) N40.0   2013 - Present    Hepatitis C B19.20   2014 - Present    Cirrhosis (CMS-HCC) K74.60   2014 - Present    Diabetes mellitus type 2, controlled (CMS-HCC) E11.9   2014 - Present "    Insomnia G47.00   8/21/2014 - Present    Chronic lower back pain M54.5, G89.29   8/28/2014 - Present    Hyperlipidemia E78.5   3/18/2015 - Present    MDD (major depressive disorder), recurrent episode, mild (CMS-HCC) F33.0   7/19/2015 - Present    Right hip pain M25.551   9/15/2015 - Present    Drug-induced hypokalemia E87.6   2/28/2016 - Present    Chronic respiratory failure with hypoxia (CMS-HCC) J96.11   5/23/2016 - Present    Chronic use of opiate for therapeutic purpose Z79.899   11/28/2016 - Present    Potential for deficient knowledge of chronic obstructive pulmonary disease Z91.89   12/1/2016 - Present    Weight gain R63.5   1/6/2017 - Present      Health Maintenance        Date Due Completion Dates    RETINAL SCREENING 11/12/1967 ---    IMM DTaP/Tdap/Td Vaccine (1 - Tdap) 11/12/1968 ---    IMM ZOSTER VACCINE 11/12/2009 ---    DIABETES MONOFILAMENT / LE EXAM 3/18/2016 3/18/2015, 8/28/2014    IMM INFLUENZA (1) 9/1/2016 ---    A1C SCREENING 7/26/2017 1/26/2017, 8/27/2016, 2/19/2016, 9/9/2015, 6/22/2015, 6/16/2015, 3/3/2015, 3/3/2015, 7/18/2014, 8/8/2012    FASTING LIPID PROFILE 8/27/2017 8/27/2016, 2/19/2016, 9/9/2015, 6/16/2015, 3/3/2015, 3/3/2015, 9/10/2014    URINE ACR / MICROALBUMIN 8/27/2017 8/27/2016, 2/19/2016, 3/3/2015, 9/10/2014 (Done)    Override on 9/10/2014: Done    SERUM CREATININE 11/22/2017 11/22/2016, 9/7/2016, 8/27/2016, 2/19/2016, 9/9/2015, 6/16/2015, 3/3/2015, 3/3/2015, 9/10/2014, 7/23/2014, 7/20/2014, 7/19/2014, 7/19/2014, 7/18/2014, 7/18/2014, 7/18/2014, 8/8/2012, 6/11/2010, 7/16/2009, 5/1/2006, 4/22/2006, 2/15/2005    COLONOSCOPY 7/10/2019 7/10/2014    IMM PNEUMOCOCCAL 65+ (ADULT) LOW/MEDIUM RISK SERIES (2 of 2 - PPSV23) 7/23/2019 8/29/2016, 7/23/2014            Current Immunizations     13-VALENT PCV PREVNAR 8/29/2016    Pneumococcal polysaccharide vaccine (PPSV-23) 7/23/2014  9:23 AM      Below and/or attached are the medications your provider expects you to take. Review all of your  home medications and newly ordered medications with your provider and/or pharmacist. Follow medication instructions as directed by your provider and/or pharmacist. Please keep your medication list with you and share with your provider. Update the information when medications are discontinued, doses are changed, or new medications (including over-the-counter products) are added; and carry medication information at all times in the event of emergency situations     Allergies:  No Known Allergies          Medications  Valid as of: March 29, 2017 -  2:31 PM    Generic Name Brand Name Tablet Size Instructions for use    Albuterol Sulfate (Aero Soln) albuterol 108 (90 BASE) MCG/ACT Inhale 2 Puffs by mouth every 6 hours as needed.        AmLODIPine Besylate (Tab) NORVASC 5 MG Take 1 Tab by mouth every day.        Aspirin (Tab)  MG Take 325 mg by mouth every day.        Carvedilol (Tab) COREG 3.125 MG TAKE 1 TABLET BY MOUTH TWICE DAILY        Fluticasone-Salmeterol (AEROSOL POWDER, BREATH ACTIVATED) ADVAIR 250-50 MCG/DOSE Inhale 1 Puff by mouth every 12 hours.        Furosemide (Tab) LASIX 20 MG TAKE 1 TABLET BY MOUTH DAILY        Lisinopril (Tab) PRINIVIL 20 MG Take 1 Tab by mouth every day.        Lisinopril (Tab) PRINIVIL 10 MG TK 1 T PO QD        MetFORMIN HCl (Tab) GLUCOPHAGE 1000 MG TAKE 1 TABLET BY MOUTH TWICE DAILY WITH MEALS        Nicotine   Apply  to skin as directed.        Omeprazole (CAPSULE DELAYED RELEASE) PRILOSEC 20 MG Take 20 mg by mouth 2 Times a Day.        Ondansetron (TABLET DISPERSIBLE) ZOFRAN ODT 4 MG Take 1 Tab by mouth every 8 hours as needed for Nausea/Vomiting.        Oxycodone-Acetaminophen (Tab) PERCOCET 7.5-325 MG Take 1 Tab by mouth every 6 hours as needed for Severe Pain. May refill 3/23/17        Potassium Chloride Abi CR (Tab CR) Kdur 20 MEQ TAKE 1 TABLET BY MOUTH EVERY DAY        Pravastatin Sodium (Tab) PRAVACHOL 10 MG TAKE 1 TABLET BY MOUTH EVERY DAY        Sertraline HCl (Tab)  ZOLOFT 50 MG TAKE 1 TABLET BY MOUTH EVERY DAY        Tamsulosin HCl (Cap) FLOMAX 0.4 MG TAKE 1 CAPSULE BY MOUTH EVERY DAY        Tamsulosin HCl (Cap) FLOMAX 0.4 MG Take 1 Cap by mouth every day.        Tiotropium Bromide Monohydrate (Cap) SPIRIVA 18 MCG INHALE 1 CAPSULE BY MOUTH VIA HANDIHALER EVERY DAY        .                 Medicines prescribed today were sent to:     [facility] [address] [place] [address], [place] - [address] [facility]    [address] [facility] [place] [address]    Phone: [phone] Fax: [phone]    Open 24 Hours?: Yes      Medication refill instructions:       If your prescription bottle indicates you have medication refills left, it is not necessary to call your provider’s office. Please contact your pharmacy and they will refill your medication.    If your prescription bottle indicates you do not have any refills left, you may request refills at any time through one of the following ways: The online [employer] system (except Urgent Care), by calling your provider’s office, or by asking your pharmacy to contact your provider’s office with a refill request. Medication refills are processed only during regular business hours and may not be available until the next business day. Your provider may request additional information or to have a follow-up visit with you prior to refilling your medication.   *Please Note: Medication refills are assigned a new Rx number when refilled electronically. Your pharmacy may indicate that no refills were authorized even though a new prescription for the same medication is available at the pharmacy. Please request the medicine by name with the pharmacy before contacting your provider for a refill.        Other Notes About Your Plan     Enrolled in R-Med Team with Dr. Reynolds.           Ema Status: Patient Declined        Quit Tobacco Information     Do you want to quit using tobacco?    Quitting tobacco decreases risks of cancer, heart and lung  disease, increases life expectancy, improves sense of taste and smell, and increases spending money, among other benefits.    If you are thinking about quitting, we can help.  • [facility] Quit Tobacco Program: [phone]  o Program occurs weekly for four weeks and includes pharmacist consultation on products to support quitting smoking or chewing tobacco. A provider referral is needed for pharmacist consultation.  • Tobacco Users Help Hotline: 5-443-QUIT-NOW (904-9476) or https://[place].quitlogix.org/  o Free, confidential telephone and online coaching for [place] residents. Sessions are designed on a schedule that is convenient for you. Eligible clients receive free nicotine replacement therapy.  • Nationally: www.smokefree.gov  o Information and professional assistance to support both immediate and long-term needs as you become, and remain, a non-smoker. Smokefree.gov allows you to choose the help that best fits your needs.

## 2017-03-29 NOTE — MR AVS SNAPSHOT
Mumtaz Carney   3/29/2017 12:00 PM   Non-Provider Visit   MRN: [id number]    Department:  [facility]   Dept Phone:  [phone]    Description:  Male : 1950   Provider:  Osei Garcia M.D.           Reason for Visit     COPD           Allergies as of 3/29/2017     No Known Allergies      You were diagnosed with     Chronic obstructive pulmonary disease, unspecified COPD type (CMS-HCC)   [[id number]]         Vital Signs     Smoking Status                   Current Some Day Smoker           Basic Information     Date Of Birth Sex Race Ethnicity Preferred Language    1950 Male White Non- English      Your appointments     Mar 29, 2017  2:40 PM   Established Patient Pul with Osei Garcia M.D.   [facility] (--)    [address]  [address]  [place] [id number] [phone]            2017  1:20 PM   Established Patient with Marlo Reynolds M.D.   [facility] ([facility])    [address]  [place] [address] [phone]           You will be receiving a confirmation call a few days before your appointment from our automated call confirmation system.              Problem List              ICD-10-CM Priority Class Noted - Resolved    COPD (chronic obstructive pulmonary disease) (CMS-HCC) (Chronic) J44.9   2010 - Present    Tobacco abuse (Chronic) Z72.0   2010 - Present    HTN (hypertension) (Chronic) I10   2010 - Present    Hx of drug abuse Z87.898   2013 - Present    Elevated liver enzymes R74.8   2013 - Present    BPH (benign prostatic hyperplasia) N40.0   2013 - Present    Hepatitis C B19.20   2014 - Present    Cirrhosis (CMS-HCC) K74.60   2014 - Present    Diabetes mellitus type 2, controlled (CMS-HCC) E11.9   2014 - Present    Insomnia G47.00   2014 - Present    Chronic lower back pain M54.5, G89.29   2014 - Present    Hyperlipidemia E78.5    3/18/2015 - Present    MDD (major depressive disorder), recurrent episode, mild (CMS-HCC) F33.0   7/19/2015 - Present    Right hip pain M25.551   9/15/2015 - Present    Drug-induced hypokalemia E87.6   2/28/2016 - Present    Chronic respiratory failure with hypoxia (CMS-HCC) J96.11   5/23/2016 - Present    Chronic use of opiate for therapeutic purpose Z79.899   11/28/2016 - Present    Potential for deficient knowledge of chronic obstructive pulmonary disease Z91.89   12/1/2016 - Present    Weight gain R63.5   1/6/2017 - Present      Health Maintenance        Date Due Completion Dates    RETINAL SCREENING 11/12/1967 ---    IMM DTaP/Tdap/Td Vaccine (1 - Tdap) 11/12/1968 ---    IMM ZOSTER VACCINE 11/12/2009 ---    DIABETES MONOFILAMENT / LE EXAM 3/18/2016 3/18/2015, 8/28/2014    IMM INFLUENZA (1) 9/1/2016 ---    A1C SCREENING 7/26/2017 1/26/2017, 8/27/2016, 2/19/2016, 9/9/2015, 6/22/2015, 6/16/2015, 3/3/2015, 3/3/2015, 7/18/2014, 8/8/2012    FASTING LIPID PROFILE 8/27/2017 8/27/2016, 2/19/2016, 9/9/2015, 6/16/2015, 3/3/2015, 3/3/2015, 9/10/2014    URINE ACR / MICROALBUMIN 8/27/2017 8/27/2016, 2/19/2016, 3/3/2015, 9/10/2014 (Done)    Override on 9/10/2014: Done    SERUM CREATININE 11/22/2017 11/22/2016, 9/7/2016, 8/27/2016, 2/19/2016, 9/9/2015, 6/16/2015, 3/3/2015, 3/3/2015, 9/10/2014, 7/23/2014, 7/20/2014, 7/19/2014, 7/19/2014, 7/18/2014, 7/18/2014, 7/18/2014, 8/8/2012, 6/11/2010, 7/16/2009, 5/1/2006, 4/22/2006, 2/15/2005    COLONOSCOPY 7/10/2019 7/10/2014    IMM PNEUMOCOCCAL 65+ (ADULT) LOW/MEDIUM RISK SERIES (2 of 2 - PPSV23) 7/23/2019 8/29/2016, 7/23/2014            Current Immunizations     13-VALENT PCV PREVNAR 8/29/2016    Pneumococcal polysaccharide vaccine (PPSV-23) 7/23/2014  9:23 AM      Below and/or attached are the medications your provider expects you to take. Review all of your home medications and newly ordered medications with your provider and/or pharmacist. Follow medication instructions as directed  by your provider and/or pharmacist. Please keep your medication list with you and share with your provider. Update the information when medications are discontinued, doses are changed, or new medications (including over-the-counter products) are added; and carry medication information at all times in the event of emergency situations     Allergies:  No Known Allergies          Medications  Valid as of: March 29, 2017 - 12:59 PM    Generic Name Brand Name Tablet Size Instructions for use    Albuterol Sulfate (Aero Soln) albuterol 108 (90 BASE) MCG/ACT Inhale 2 Puffs by mouth every 6 hours as needed.        AmLODIPine Besylate (Tab) NORVASC 5 MG Take 1 Tab by mouth every day.        Aspirin (Tab)  MG Take 325 mg by mouth every day.        Carvedilol (Tab) COREG 3.125 MG TAKE 1 TABLET BY MOUTH TWICE DAILY        Fluticasone-Salmeterol (AEROSOL POWDER, BREATH ACTIVATED) ADVAIR 250-50 MCG/DOSE Inhale 1 Puff by mouth every 12 hours.        Furosemide (Tab) LASIX 20 MG TAKE 1 TABLET BY MOUTH DAILY        Lisinopril (Tab) PRINIVIL 20 MG Take 1 Tab by mouth every day.        MetFORMIN HCl (Tab) GLUCOPHAGE 1000 MG TAKE 1 TABLET BY MOUTH TWICE DAILY WITH MEALS        Nicotine   Apply  to skin as directed.        Omeprazole (CAPSULE DELAYED RELEASE) PRILOSEC 20 MG Take 20 mg by mouth 2 Times a Day.        Ondansetron (TABLET DISPERSIBLE) ZOFRAN ODT 4 MG Take 1 Tab by mouth every 8 hours as needed for Nausea/Vomiting.        Oxycodone-Acetaminophen (Tab) PERCOCET 7.5-325 MG Take 1 Tab by mouth every 6 hours as needed for Severe Pain. May refill 3/23/17        Potassium Chloride Abi CR (Tab CR) Kdur 20 MEQ TAKE 1 TABLET BY MOUTH EVERY DAY        Pravastatin Sodium (Tab) PRAVACHOL 10 MG TAKE 1 TABLET BY MOUTH EVERY DAY        Sertraline HCl (Tab) ZOLOFT 50 MG TAKE 1 TABLET BY MOUTH EVERY DAY        Tamsulosin HCl (Cap) FLOMAX 0.4 MG TAKE 1 CAPSULE BY MOUTH EVERY DAY        Tamsulosin HCl (Cap) FLOMAX 0.4 MG Take 1 Cap by  mouth every day.        Tiotropium Bromide Monohydrate (Cap) SPIRIVA 18 MCG INHALE 1 CAPSULE BY MOUTH VIA HANDIHALER EVERY DAY        .                 Medicines prescribed today were sent to:     [facility] [id number] [place] [address], [place] - [address] [facility] [address] [place]    [address] [place] [address]    Phone: [phone] Fax: [phone]    Open 24 Hours?: Yes      Medication refill instructions:       If your prescription bottle indicates you have medication refills left, it is not necessary to call your provider’s office. Please contact your pharmacy and they will refill your medication.    If your prescription bottle indicates you do not have any refills left, you may request refills at any time through one of the following ways: The online [employer] system (except Urgent Care), by calling your provider’s office, or by asking your pharmacy to contact your provider’s office with a refill request. Medication refills are processed only during regular business hours and may not be available until the next business day. Your provider may request additional information or to have a follow-up visit with you prior to refilling your medication.   *Please Note: Medication refills are assigned a new Rx number when refilled electronically. Your pharmacy may indicate that no refills were authorized even though a new prescription for the same medication is available at the pharmacy. Please request the medicine by name with the pharmacy before contacting your provider for a refill.        Other Notes About Your Plan     Enrolled in [facility] Team with Dr. Reynolds.           Ema Status: Patient Declined        Quit Tobacco Information     Do you want to quit using tobacco?    Quitting tobacco decreases risks of cancer, heart and lung disease, increases life expectancy, improves sense of taste and smell, and increases spending money, among other benefits.    If you are thinking about quitting, we can  help.  • [facility] Quit Tobacco Program: 450-568-3265  o Program occurs weekly for four weeks and includes pharmacist consultation on products to support quitting smoking or chewing tobacco. A provider referral is needed for pharmacist consultation.  • Tobacco Users Help Hotline: 8-054-QUIT-NOW (986-0752) or https://[place].quitlogix.org/  o Free, confidential telephone and online coaching for [place] residents. Sessions are designed on a schedule that is convenient for you. Eligible clients receive free nicotine replacement therapy.  • Nationally: www.smokefree.gov  o Information and professional assistance to support both immediate and long-term needs as you become, and remain, a non-smoker. Smokefree.gov allows you to choose the help that best fits your needs.

## 2017-04-06 ENCOUNTER — OFFICE VISIT (OUTPATIENT)
Dept: MEDICAL GROUP | Facility: MEDICAL CENTER | Age: 67
End: 2017-04-06
Payer: MEDICARE

## 2017-04-06 ENCOUNTER — PATIENT OUTREACH (OUTPATIENT)
Dept: HEALTH INFORMATION MANAGEMENT | Facility: OTHER | Age: 67
End: 2017-04-06

## 2017-04-06 VITALS
WEIGHT: 174 LBS | HEART RATE: 96 BPM | SYSTOLIC BLOOD PRESSURE: 122 MMHG | OXYGEN SATURATION: 90 % | RESPIRATION RATE: 16 BRPM | HEIGHT: 70 IN | DIASTOLIC BLOOD PRESSURE: 82 MMHG | TEMPERATURE: 98.1 F | BODY MASS INDEX: 24.91 KG/M2

## 2017-04-06 DIAGNOSIS — M54.50 CHRONIC BILATERAL LOW BACK PAIN WITHOUT SCIATICA: ICD-10-CM

## 2017-04-06 DIAGNOSIS — G89.29 CHRONIC BILATERAL LOW BACK PAIN WITHOUT SCIATICA: ICD-10-CM

## 2017-04-06 DIAGNOSIS — I10 ESSENTIAL HYPERTENSION: Chronic | ICD-10-CM

## 2017-04-06 DIAGNOSIS — J96.11 CHRONIC RESPIRATORY FAILURE WITH HYPOXIA (HCC): ICD-10-CM

## 2017-04-06 DIAGNOSIS — J44.1 CHRONIC OBSTRUCTIVE PULMONARY DISEASE WITH ACUTE EXACERBATION (HCC): ICD-10-CM

## 2017-04-06 DIAGNOSIS — E11.9 CONTROLLED TYPE 2 DIABETES MELLITUS WITHOUT COMPLICATION, WITHOUT LONG-TERM CURRENT USE OF INSULIN (HCC): ICD-10-CM

## 2017-04-06 DIAGNOSIS — Z72.0 TOBACCO ABUSE: Chronic | ICD-10-CM

## 2017-04-06 DIAGNOSIS — J44.9 CHRONIC OBSTRUCTIVE PULMONARY DISEASE, UNSPECIFIED COPD TYPE (HCC): Chronic | ICD-10-CM

## 2017-04-06 PROBLEM — Z79.891 CHRONIC USE OF OPIATE DRUGS THERAPEUTIC PURPOSES: Status: ACTIVE | Noted: 2017-04-06

## 2017-04-06 PROCEDURE — 99214 OFFICE O/P EST MOD 30 MIN: CPT | Performed by: FAMILY MEDICINE

## 2017-04-06 PROCEDURE — G8432 DEP SCR NOT DOC, RNG: HCPCS | Performed by: FAMILY MEDICINE

## 2017-04-06 PROCEDURE — 92250 FUNDUS PHOTOGRAPHY W/I&R: CPT | Mod: TC | Performed by: FAMILY MEDICINE

## 2017-04-06 PROCEDURE — 1101F PT FALLS ASSESS-DOCD LE1/YR: CPT | Performed by: FAMILY MEDICINE

## 2017-04-06 PROCEDURE — 3044F HG A1C LEVEL LT 7.0%: CPT | Performed by: FAMILY MEDICINE

## 2017-04-06 PROCEDURE — 4040F PNEUMOC VAC/ADMIN/RCVD: CPT | Performed by: FAMILY MEDICINE

## 2017-04-06 PROCEDURE — G8420 CALC BMI NORM PARAMETERS: HCPCS | Performed by: FAMILY MEDICINE

## 2017-04-06 PROCEDURE — 3017F COLORECTAL CA SCREEN DOC REV: CPT | Performed by: FAMILY MEDICINE

## 2017-04-06 PROCEDURE — 4004F PT TOBACCO SCREEN RCVD TLK: CPT | Performed by: FAMILY MEDICINE

## 2017-04-06 RX ORDER — OXYCODONE AND ACETAMINOPHEN 7.5; 325 MG/1; MG/1
1 TABLET ORAL EVERY 6 HOURS PRN
Qty: 120 TAB | Refills: 0 | Status: SHIPPED | OUTPATIENT
Start: 2017-04-06 | End: 2017-04-06 | Stop reason: SDUPTHER

## 2017-04-06 RX ORDER — TIOTROPIUM BROMIDE 18 UG/1
CAPSULE ORAL; RESPIRATORY (INHALATION)
Qty: 90 CAP | Refills: 3 | Status: SHIPPED | OUTPATIENT
Start: 2017-04-06 | End: 2017-10-16 | Stop reason: SDUPTHER

## 2017-04-06 RX ORDER — OXYCODONE AND ACETAMINOPHEN 7.5; 325 MG/1; MG/1
1 TABLET ORAL EVERY 6 HOURS PRN
Qty: 120 TAB | Refills: 0 | Status: SHIPPED | OUTPATIENT
Start: 2017-04-06 | End: 2017-07-06

## 2017-04-06 NOTE — ASSESSMENT & PLAN NOTE
Chronic back pain. Patient has known wedge fracture in Thoracic spine, recently seen on x-ray.  Current pain control: good, 4/10  Adverse effects: none.  Physical functioning: good  Mood: fair  Family and social relationships: fair  Sleep pattern: good  Overall functioning: fair  Nonnarcotic treatments that are being used: nothing right now, has tried topical OTC pain relievers    Pain management agreement initiated and signed on: June 2016  Last dose of narcotic medication: 8 am this morning  Most recent urine drug screen done March 2017, which was reviewed today and had Xanax in blood which he uses infrequently.  Aberrant Behaviors: None  I have reviewed the medical records, the Prescription Monitoring Program and I have determined that percocet 7.5, 4 times daily, is medically indicated.    I have advised patient to keep medication in a safe place and to not drive with medication.

## 2017-04-06 NOTE — ASSESSMENT & PLAN NOTE
He is cutting back on smoking, but still smoking 3-5 cigs per day.  He tries to quit then he gets extreme cravings. We have offered him tobacco supplementation in the past, which he states has not worked for him.

## 2017-04-06 NOTE — PROGRESS NOTES
Subjective:   Mumtaz Carney is a 66 y.o. male here today for COPD, back pain    COPD (chronic obstructive pulmonary disease)  Has been placed on a prednisone taper by pulmonologist.  He is on spirivia and pulmonologist added symbicort.    Tobacco abuse  He is cutting back on smoking, but still smoking 3-5 cigs per day.  He tries to quit then he gets extreme cravings. We have offered him tobacco supplementation in the past, which he states has not worked for him.    Diabetes mellitus type 2, controlled  He is on metformin 1000 mg twice daily.  He states his been monitoring his blood sugar while on prednisone and blood sugar has not become elevated.    HTN (hypertension)  Tolerating lisinopril 20 mg daily, coreg 3.125 mg twice daily, amlodipine 5 mg daily.    Chronic lower back pain  Chronic back pain. Patient has known wedge fracture in Thoracic spine, recently seen on x-ray.  Current pain control: good, 4/10  Adverse effects: none.  Physical functioning: good  Mood: fair  Family and social relationships: fair  Sleep pattern: good  Overall functioning: fair  Nonnarcotic treatments that are being used: nothing right now, has tried topical OTC pain relievers    Pain management agreement initiated and signed on: June 2016  Last dose of narcotic medication: 8 am this morning  Most recent urine drug screen done March 2017, which was reviewed today and had Xanax in blood which he uses infrequently.  Aberrant Behaviors: None  I have reviewed the medical records, the Prescription Monitoring Program and I have determined that percocet 7.5, 4 times daily, is medically indicated.    I have advised patient to keep medication in a safe place and to not drive with medication.    Chronic respiratory failure with hypoxia (CMS-HCC)  Patient uses 3 L nasal cannula when at home and resting and 5 L nasal cannula when ambulating.         Current medicines (including changes today)  Current Outpatient Prescriptions   Medication Sig  Dispense Refill   • oxycodone-acetaminophen (PERCOCET) 7.5-325 MG per tablet Take 1 Tab by mouth every 6 hours as needed for Severe Pain. May refill 6/13/17 120 Tab 0   • tiotropium (SPIRIVA HANDIHALER) 18 MCG Cap INHALE 1 CAPSULE BY MOUTH VIA HANDIHALER EVERY DAY 90 Cap 3   • budesonide-formoterol (SYMBICORT) 160-4.5 MCG/ACT Aerosol Inhale 2 Puffs by mouth 2 Times a Day. Use spacer. Rinse mouth after each use. 1 Inhaler 11   • predniSONE (DELTASONE) 10 MG Tab Take 40mg x 4 days, then take 30mg x 4 days, then take 20mg x 4 days, then 10mg x 4 days with food, then discontinue. 40 Tab 1   • azithromycin (ZITHROMAX) 250 MG Tab Take 2 tablets on day 1, then take 1 tablet a day for 4 days. 6 Tab 0   • sertraline (ZOLOFT) 50 MG Tab TAKE 1 TABLET BY MOUTH EVERY DAY 90 Tab 3   • tamsulosin (FLOMAX) 0.4 MG capsule Take 1 Cap by mouth every day. 90 Cap 3   • lisinopril (PRINIVIL) 20 MG Tab Take 1 Tab by mouth every day. 90 Tab 3   • amlodipine (NORVASC) 5 MG Tab Take 1 Tab by mouth every day. 90 Tab 0   • aspirin (ASA) 325 MG Tab Take 325 mg by mouth every day.     • NICOTINE STEP 2 TD Apply  to skin as directed.     • ondansetron (ZOFRAN ODT) 4 MG TABLET DISPERSIBLE Take 1 Tab by mouth every 8 hours as needed for Nausea/Vomiting. 10 Tab 0   • furosemide (LASIX) 20 MG Tab TAKE 1 TABLET BY MOUTH DAILY 90 Tab 3   • metformin (GLUCOPHAGE) 1000 MG tablet TAKE 1 TABLET BY MOUTH TWICE DAILY WITH MEALS 180 Tab 1   • pravastatin (PRAVACHOL) 10 MG Tab TAKE 1 TABLET BY MOUTH EVERY DAY 90 Tab 3   • carvedilol (COREG) 3.125 MG Tab TAKE 1 TABLET BY MOUTH TWICE DAILY 180 Tab 3   • albuterol (VENTOLIN OR PROVENTIL) 108 (90 BASE) MCG/ACT Aero Soln inhalation aerosol Inhale 2 Puffs by mouth every 6 hours as needed. 8.5 g 6   • potassium chloride SA (K-DUR) 20 MEQ Tab CR TAKE 1 TABLET BY MOUTH EVERY DAY 90 Tab 3   • omeprazole (PRILOSEC) 20 MG CPDR Take 20 mg by mouth 2 Times a Day.       No current facility-administered medications for this  "visit.     He  has a past medical history of Aortic stenosis; Wound; Hypertension; Bronchitis; COPD; CHF (congestive heart failure) (CMS-[facility]) (6/11/2010); Tobacco abuse (6/11/2010); COPD (chronic obstructive pulmonary disease) (CMS-HCC); Severe aortic stenosis (12/17/2013); Hepatitis C (1/16/2014); Other specified disorder of intestines; Indigestion; Heart burn; Other emphysema (CMS-HCC); Breath shortness; Psychiatric problem; MRSA (methicillin resistant Staphylococcus aureus); and Cirrhosis (CMS-HCC) (2014). He also has no past medical history of Unspecified hemorrhagic conditions (CMS-HCC), Personal history of venous thrombosis and embolism, Cancer (CMS-HCC), Diabetes, Unspecified disorder of thyroid, Glaucoma, CATARACT, Stroke (CMS-HCC), Seizure (CMS-HCC), Myocardial infarct (CMS-HCC), Arrhythmia, Pacemaker, Angina, Heart murmur, Rheumatic fever, ASTHMA, Pneumonia, Jaundice, Unspecified urinary incontinence, Renal disorder, Dialysis, Other specified symptom associated with female genital organs, Arthritis, or Backpain.    ROS   No chest pain, no shortness of breath       Objective:     Blood pressure 122/82, pulse 96, temperature 36.7 °C (98.1 °F), resp. rate 16, height 1.778 m (5' 10\"), weight 78.926 kg (174 lb), SpO2 90 %. Body mass index is 24.97 kg/(m^2).   Physical Exam:  Constitutional: Alert, no distress.  Skin: Warm, dry, good turgor, no rashes in visible areas.  Eye: Equal, round and reactive, conjunctiva clear, lids normal.  Respiratory: Unlabored respiratory effort, lungs clear to auscultation, no wheezes, no ronchi.  Psych: Alert and oriented x3, normal affect and mood.        Assessment and Plan:   The following treatment plan was discussed    1. Chronic obstructive pulmonary disease, unspecified COPD type (CMS-[facility])  Improved with prednisone. Continue Symbicort and Spiriva. Continue follow-up with pulmonology.    2. Chronic respiratory failure with hypoxia (CMS-[facility])  Stable. Continue Septra " oxygen.    3. Controlled type 2 diabetes mellitus without complication, without long-term current use of insulin (CMS-[facility])  Stable. Continue metformin. Follow-up in 3 months.  - POCT Retinal Eye Exam    4. Essential hypertension  Controlled. Continue current medication.    5. Tobacco abuse  Advised patient to quit.    6. Chronic bilateral low back pain without sciatica  Advised patient to not use Xanax. Continue Percocet 7.5 mg 4 times a day. Prescriptions given for 3 months. Follow-up in 3 months for additional refills.  - oxycodone-acetaminophen (PERCOCET) 7.5-325 MG per tablet; Take 1 Tab by mouth every 6 hours as needed for Severe Pain. May refill 6/13/17  Dispense: 120 Tab; Refill: 0      Followup: Return in about 3 months (around 7/6/2017) for Pain medication refill, Short.

## 2017-04-06 NOTE — TELEPHONE ENCOUNTER
Was the patient seen in the last year in this department? Yes     Does patient have an active prescription for medications requested? No     Received Request Via: Pharmacy     Last seen: 03/06/2017 [place]

## 2017-04-06 NOTE — MR AVS SNAPSHOT
"        Mumtaz Carney   2017 1:20 PM   Office Visit   MRN: [id number]    Department:  [facility]   Dept Phone:  [phone]    Description:  Male : 1950   Provider:  Marlo Reynolds M.D.           Reason for Visit     Follow-Up           Allergies as of 2017     No Known Allergies      You were diagnosed with     Chronic obstructive pulmonary disease, unspecified COPD type (CMS-HCC)   [6815600]       Chronic respiratory failure with hypoxia (CMS-HCC)   [369846]       Controlled type 2 diabetes mellitus without complication, without long-term current use of insulin (CMS-HCC)   [7196230]       Essential hypertension   [7113782]       Tobacco abuse   [693123]       Chronic bilateral low back pain without sciatica   [7317197]         Vital Signs     Blood Pressure Pulse Temperature Respirations Height Weight    122/82 mmHg 96 36.7 °C (98.1 °F) 16 1.778 m (5' 10\") 78.926 kg (174 lb)    Body Mass Index Oxygen Saturation Smoking Status             24.97 kg/m2 90% Current Some Day Smoker         Basic Information     Date Of Birth Sex Race Ethnicity Preferred Language    1950 Male White Non- English      Your appointments     2017  1:20 PM   Established Patient with Marlo Reynolds M.D.   [facility]    [address]  [facility] [id number] [phone]           You will be receiving a confirmation call a few days before your appointment from our automated call confirmation system.              Problem List              ICD-10-CM Priority Class Noted - Resolved    COPD (chronic obstructive pulmonary disease) (CMS-HCC) (Chronic) J44.9   2010 - Present    Tobacco abuse (Chronic) Z72.0   2010 - Present    HTN (hypertension) (Chronic) I10   2010 - Present    Hx of drug abuse Z87.898   2013 - Present    Elevated liver enzymes R74.8   2013 - Present    BPH (benign prostatic hyperplasia) N40.0   " 12/17/2013 - Present    Hepatitis C B19.20   1/16/2014 - Present    Cirrhosis (CMS-HCC) K74.60   7/18/2014 - Present    Diabetes mellitus type 2, controlled (CMS-HCC) E11.9   7/29/2014 - Present    Insomnia G47.00   8/21/2014 - Present    Chronic lower back pain M54.5, G89.29   8/28/2014 - Present    Hyperlipidemia E78.5   3/18/2015 - Present    MDD (major depressive disorder), recurrent episode, mild (CMS-HCC) F33.0   7/19/2015 - Present    Right hip pain M25.551   9/15/2015 - Present    Drug-induced hypokalemia E87.6   2/28/2016 - Present    Chronic respiratory failure with hypoxia (CMS-HCC) J96.11   5/23/2016 - Present    Weight gain R63.5   1/6/2017 - Present    Chronic use of opiate drugs therapeutic purposes Z79.899   4/6/2017 - Present      Health Maintenance        Date Due Completion Dates    RETINAL SCREENING 11/12/1967 ---    IMM DTaP/Tdap/Td Vaccine (1 - Tdap) 11/12/1968 ---    IMM ZOSTER VACCINE 11/12/2009 ---    DIABETES MONOFILAMENT / LE EXAM 3/18/2016 3/18/2015, 8/28/2014    A1C SCREENING 7/26/2017 1/26/2017, 8/27/2016, 2/19/2016, 9/9/2015, 6/22/2015, 6/16/2015, 3/3/2015, 3/3/2015, 7/18/2014, 8/8/2012    FASTING LIPID PROFILE 8/27/2017 8/27/2016, 2/19/2016, 9/9/2015, 6/16/2015, 3/3/2015, 3/3/2015, 9/10/2014    URINE ACR / MICROALBUMIN 8/27/2017 8/27/2016, 2/19/2016, 3/3/2015, 9/10/2014 (Done)    Override on 9/10/2014: Done    SERUM CREATININE 11/22/2017 11/22/2016, 9/7/2016, 8/27/2016, 2/19/2016, 9/9/2015, 6/16/2015, 3/3/2015, 3/3/2015, 9/10/2014, 7/23/2014, 7/20/2014, 7/19/2014, 7/19/2014, 7/18/2014, 7/18/2014, 7/18/2014, 8/8/2012, 6/11/2010, 7/16/2009, 5/1/2006, 4/22/2006, 2/15/2005    COLONOSCOPY 7/10/2019 7/10/2014    IMM PNEUMOCOCCAL 65+ (ADULT) LOW/MEDIUM RISK SERIES (2 of 2 - PPSV23) 7/23/2019 8/29/2016, 7/23/2014            Current Immunizations     13-VALENT PCV PREVNAR 8/29/2016    Pneumococcal polysaccharide vaccine (PPSV-23) 7/23/2014  9:23 AM      Below and/or attached are the  medications your provider expects you to take. Review all of your home medications and newly ordered medications with your provider and/or pharmacist. Follow medication instructions as directed by your provider and/or pharmacist. Please keep your medication list with you and share with your provider. Update the information when medications are discontinued, doses are changed, or new medications (including over-the-counter products) are added; and carry medication information at all times in the event of emergency situations     Allergies:  No Known Allergies          Medications  Valid as of: April 06, 2017 -  1:41 PM    Generic Name Brand Name Tablet Size Instructions for use    Albuterol Sulfate (Aero Soln) albuterol 108 (90 BASE) MCG/ACT Inhale 2 Puffs by mouth every 6 hours as needed.        AmLODIPine Besylate (Tab) NORVASC 5 MG Take 1 Tab by mouth every day.        Aspirin (Tab)  MG Take 325 mg by mouth every day.        Azithromycin (Tab) ZITHROMAX 250 MG Take 2 tablets on day 1, then take 1 tablet a day for 4 days.        Budesonide-Formoterol Fumarate (Aerosol) SYMBICORT 160-4.5 MCG/ACT Inhale 2 Puffs by mouth 2 Times a Day. Use spacer. Rinse mouth after each use.        Carvedilol (Tab) COREG 3.125 MG TAKE 1 TABLET BY MOUTH TWICE DAILY        Furosemide (Tab) LASIX 20 MG TAKE 1 TABLET BY MOUTH DAILY        Lisinopril (Tab) PRINIVIL 20 MG Take 1 Tab by mouth every day.        MetFORMIN HCl (Tab) GLUCOPHAGE 1000 MG TAKE 1 TABLET BY MOUTH TWICE DAILY WITH MEALS        Nicotine   Apply  to skin as directed.        Omeprazole (CAPSULE DELAYED RELEASE) PRILOSEC 20 MG Take 20 mg by mouth 2 Times a Day.        Ondansetron (TABLET DISPERSIBLE) ZOFRAN ODT 4 MG Take 1 Tab by mouth every 8 hours as needed for Nausea/Vomiting.        Oxycodone-Acetaminophen (Tab) PERCOCET 7.5-325 MG Take 1 Tab by mouth every 6 hours as needed for Severe Pain. May refill 6/13/17        Potassium Chloride Abi CR (Tab CR) Kdur 20  MEQ TAKE 1 TABLET BY MOUTH EVERY DAY        Pravastatin Sodium (Tab) PRAVACHOL 10 MG TAKE 1 TABLET BY MOUTH EVERY DAY        PredniSONE (Tab) DELTASONE 10 MG Take 40mg x 4 days, then take 30mg x 4 days, then take 20mg x 4 days, then 10mg x 4 days with food, then discontinue.        Sertraline HCl (Tab) ZOLOFT 50 MG TAKE 1 TABLET BY MOUTH EVERY DAY        Tamsulosin HCl (Cap) FLOMAX 0.4 MG Take 1 Cap by mouth every day.        Tiotropium Bromide Monohydrate (Cap) SPIRIVA 18 MCG INHALE 1 CAPSULE BY MOUTH VIA HANDIHALER EVERY DAY        .                 Medicines prescribed today were sent to:     [facility] [address] [place] [address], [place] - [address]    [address] [address]    Phone: [phone] Fax: [phone]    Open 24 Hours?: Yes      Medication refill instructions:       If your prescription bottle indicates you have medication refills left, it is not necessary to call your provider’s office. Please contact your pharmacy and they will refill your medication.    If your prescription bottle indicates you do not have any refills left, you may request refills at any time through one of the following ways: The online [employer] system (except Urgent Care), by calling your provider’s office, or by asking your pharmacy to contact your provider’s office with a refill request. Medication refills are processed only during regular business hours and may not be available until the next business day. Your provider may request additional information or to have a follow-up visit with you prior to refilling your medication.   *Please Note: Medication refills are assigned a new Rx number when refilled electronically. Your pharmacy may indicate that no refills were authorized even though a new prescription for the same medication is available at the pharmacy. Please request the medicine by name with the pharmacy before contacting your provider for a refill.        Other Notes About  Your Plan     Enrolled in R-Med Team with Dr. Reynolds.           Ema Status: Patient Declined        Quit Tobacco Information     Do you want to quit using tobacco?    Quitting tobacco decreases risks of cancer, heart and lung disease, increases life expectancy, improves sense of taste and smell, and increases spending money, among other benefits.    If you are thinking about quitting, we can help.  • [facility] Quit Tobacco Program: 432.733.3996  o Program occurs weekly for four weeks and includes pharmacist consultation on products to support quitting smoking or chewing tobacco. A provider referral is needed for pharmacist consultation.  • Tobacco Users Help Hotline: 5-800QUIT-NOW (856-0071) or https://[place].quitlogix.org/  o Free, confidential telephone and online coaching for [place] residents. Sessions are designed on a schedule that is convenient for you. Eligible clients receive free nicotine replacement therapy.  • Nationally: www.smokefree.gov  o Information and professional assistance to support both immediate and long-term needs as you become, and remain, a non-smoker. Smokefree.gov allows you to choose the help that best fits your needs.

## 2017-04-06 NOTE — ASSESSMENT & PLAN NOTE
He is on metformin 1000 mg twice daily.  He states his been monitoring his blood sugar while on prednisone and blood sugar has not become elevated.

## 2017-04-06 NOTE — ASSESSMENT & PLAN NOTE
Has been placed on a prednisone taper by pulmonologist.  He is on spirivia and pulmonologist added symbicort.

## 2017-04-07 ENCOUNTER — TELEPHONE (OUTPATIENT)
Dept: MEDICAL GROUP | Facility: MEDICAL CENTER | Age: 67
End: 2017-04-07

## 2017-04-07 NOTE — TELEPHONE ENCOUNTER
----- Message from Marlo Reynolds M.D. sent at 4/7/2017  7:30 AM PDT -----  Please notify patient that retinas are normal.  Marlo Reynolds M.D.

## 2017-05-08 ENCOUNTER — PATIENT OUTREACH (OUTPATIENT)
Dept: HEALTH INFORMATION MANAGEMENT | Facility: OTHER | Age: 67
End: 2017-05-08

## 2017-05-08 RX ORDER — PRAVASTATIN SODIUM 10 MG
TABLET ORAL
Qty: 90 TAB | Refills: 3 | Status: SHIPPED | OUTPATIENT
Start: 2017-05-08 | End: 2017-07-06

## 2017-05-08 RX ORDER — POTASSIUM CHLORIDE 20 MEQ/1
TABLET, EXTENDED RELEASE ORAL
Qty: 90 TAB | Refills: 3 | Status: SHIPPED | OUTPATIENT
Start: 2017-05-08 | End: 2018-06-15 | Stop reason: SDUPTHER

## 2017-05-08 NOTE — PROGRESS NOTES
CC follow-up call. CC spoke to patient. Reports he has been feeling about the same. States no cough, no shortness of breath. States he hasn't been able to do much activity. Reports he does get tired with activity.   Patient reports he does have all of his medications and is taking as directed.   Reports he continues to smoke 3-4 cigarettes a day.  States he is doing his best to try and quit smoking.  CC reviewed all upcoming appointments. Patient has CC contact information for questions or concerns. CC will follow-up in one month.

## 2017-05-12 RX ORDER — CARVEDILOL 3.12 MG/1
TABLET ORAL
Qty: 180 TAB | Refills: 3 | Status: SHIPPED | OUTPATIENT
Start: 2017-05-12 | End: 2018-07-29 | Stop reason: SDUPTHER

## 2017-05-15 RX ORDER — PRAVASTATIN SODIUM 10 MG
TABLET ORAL
Qty: 90 TAB | Refills: 3 | Status: SHIPPED | OUTPATIENT
Start: 2017-05-15 | End: 2018-06-04

## 2017-06-08 ENCOUNTER — PATIENT OUTREACH (OUTPATIENT)
Dept: HEALTH INFORMATION MANAGEMENT | Facility: OTHER | Age: 67
End: 2017-06-08

## 2017-06-15 ENCOUNTER — PATIENT OUTREACH (OUTPATIENT)
Dept: HEALTH INFORMATION MANAGEMENT | Facility: OTHER | Age: 67
End: 2017-06-15

## 2017-06-15 NOTE — PROGRESS NOTES
"RN CC outreach call for follow-up.  Patient reports he has been having a problem with heartburn for the past several days. Reports \"I've been burping a lot\". States no chest pain or shortness of breath.  Reports he ran out of his omeprazole. Reports he has been eating some spicy food lately.   Patient states he is suppose to schedule a follow-up with his GI provider.  Reports he will contact their office today for appointment.  Patient states he will get his omeprazole today. CC offered to schedule patient appointment with PCP if wanting sooner appointment.  Patient declining.  States he will keep his scheduled appointment.  Reviewed ER precautions including chest pain or shortness of breath. Patient voiced understanding.    Patient reports no respiratory problems.  He is self monitoring his symptoms for COPD. States he continues to smoke.  Reports he has tried patches in the past but is having a difficult time with smoking cessation.      Plan:  Patient has been on complex care management services since 12/1/17 including [facility] program.  Patient to continue to self monitor and follow-up with his PCP and specialist.  CC will not actively follow patient.  Patient does have CC contact information if needing any resources.   "

## 2017-07-06 ENCOUNTER — TELEPHONE (OUTPATIENT)
Dept: MEDICAL GROUP | Facility: MEDICAL CENTER | Age: 67
End: 2017-07-06

## 2017-07-06 ENCOUNTER — OFFICE VISIT (OUTPATIENT)
Dept: MEDICAL GROUP | Facility: MEDICAL CENTER | Age: 67
End: 2017-07-06
Payer: MEDICARE

## 2017-07-06 VITALS
TEMPERATURE: 97.3 F | HEART RATE: 94 BPM | DIASTOLIC BLOOD PRESSURE: 86 MMHG | SYSTOLIC BLOOD PRESSURE: 128 MMHG | HEIGHT: 70 IN | OXYGEN SATURATION: 92 % | RESPIRATION RATE: 16 BRPM | WEIGHT: 180 LBS | BODY MASS INDEX: 25.77 KG/M2

## 2017-07-06 DIAGNOSIS — G89.29 CHRONIC BILATERAL LOW BACK PAIN WITHOUT SCIATICA: ICD-10-CM

## 2017-07-06 DIAGNOSIS — E11.9 CONTROLLED TYPE 2 DIABETES MELLITUS WITHOUT COMPLICATION, WITHOUT LONG-TERM CURRENT USE OF INSULIN (HCC): ICD-10-CM

## 2017-07-06 DIAGNOSIS — K21.9 GASTROESOPHAGEAL REFLUX DISEASE, ESOPHAGITIS PRESENCE NOT SPECIFIED: ICD-10-CM

## 2017-07-06 DIAGNOSIS — Z72.0 TOBACCO ABUSE: Chronic | ICD-10-CM

## 2017-07-06 DIAGNOSIS — M54.50 CHRONIC BILATERAL LOW BACK PAIN WITHOUT SCIATICA: ICD-10-CM

## 2017-07-06 LAB
HBA1C MFR BLD: 6.2 % (ref ?–5.8)
INT CON NEG: NEGATIVE
INT CON POS: POSITIVE

## 2017-07-06 PROCEDURE — 83036 HEMOGLOBIN GLYCOSYLATED A1C: CPT | Performed by: FAMILY MEDICINE

## 2017-07-06 PROCEDURE — 99214 OFFICE O/P EST MOD 30 MIN: CPT | Performed by: FAMILY MEDICINE

## 2017-07-06 RX ORDER — FUROSEMIDE 20 MG/1
20 TABLET ORAL DAILY
Qty: 90 TAB | Refills: 3 | Status: SHIPPED | OUTPATIENT
Start: 2017-07-06 | End: 2017-10-05

## 2017-07-06 RX ORDER — OXYCODONE AND ACETAMINOPHEN 10; 325 MG/1; MG/1
1 TABLET ORAL EVERY 6 HOURS PRN
Qty: 120 TAB | Refills: 0 | Status: SHIPPED | OUTPATIENT
Start: 2017-07-06 | End: 2017-07-06 | Stop reason: SDUPTHER

## 2017-07-06 RX ORDER — NICOTINE 21 MG/24HR
1 PATCH, TRANSDERMAL 24 HOURS TRANSDERMAL EVERY 24 HOURS
Qty: 30 PATCH | Refills: 5 | Status: SHIPPED | OUTPATIENT
Start: 2017-07-06 | End: 2017-10-16

## 2017-07-06 RX ORDER — OMEPRAZOLE 20 MG/1
20 CAPSULE, DELAYED RELEASE ORAL 2 TIMES DAILY
Qty: 180 CAP | Refills: 3 | Status: SHIPPED | OUTPATIENT
Start: 2017-07-06 | End: 2018-08-02 | Stop reason: SDUPTHER

## 2017-07-06 RX ORDER — OXYCODONE AND ACETAMINOPHEN 10; 325 MG/1; MG/1
1 TABLET ORAL EVERY 6 HOURS PRN
Qty: 120 TAB | Refills: 0 | Status: SHIPPED | OUTPATIENT
Start: 2017-07-06 | End: 2017-09-18 | Stop reason: SDUPTHER

## 2017-07-06 NOTE — ASSESSMENT & PLAN NOTE
Patient quit smoking for 14 days with nicoderm patches. He ran out and started smoking again. He is requesting prescription for nicoderm.

## 2017-07-06 NOTE — PROGRESS NOTES
Subjective:   Mumtaz Carney is a 66 y.o. male here today for back pain    Chronic lower back pain  Chronic back pain. Patient has known wedge fracture in Thoracic spine, recently seen on x-ray.  Patient states that Percocet 7.5 mg tablets, 4 times daily, are not effective at controlling his pain.  Current pain control: good, 4/10  Adverse effects: none.  Physical functioning: good  Mood: fair  Family and social relationships: fair  Sleep pattern: good  Overall functioning: fair  Nonnarcotic treatments that are being used: nothing right now, has tried topical OTC pain relievers    Pain management agreement initiated and signed on: June 2016  Last dose of narcotic medication: 8 am this morning  Most recent urine drug screen done March 2017, which was reviewed today and had Xanax in blood which he uses infrequently.  Aberrant Behaviors: None  I have reviewed the medical records, the Prescription Monitoring Program and I have determined that percocet 10, 4 times daily, is medically indicated.    I have advised patient to keep medication in a safe place and to not drive with medication.    Tobacco abuse  Patient quit smoking for 14 days with nicoderm patches. He ran out and started smoking again. He is requesting prescription for nicoderm.    GERD (gastroesophageal reflux disease)  Patient has run out of omeprazole 20 mg twice daily. He is having recurrent GERD and is requesting a refill.    Diabetes mellitus type 2, controlled  Patient is tolerating metformin 1000 mg twice daily.           Current medicines (including changes today)  Current Outpatient Prescriptions   Medication Sig Dispense Refill   • oxycodone-acetaminophen (PERCOCET-10)  MG Tab Take 1 Tab by mouth every 6 hours as needed for Severe Pain. May refill 9/1/17 120 Tab 0   • nicotine (NICODERM) 21 MG/24HR PATCH 24 HR Apply 1 Patch to skin as directed every 24 hours. 30 Patch 5   • furosemide (LASIX) 20 MG Tab Take 1 Tab by mouth every day. 90  Tab 3   • omeprazole (PRILOSEC) 20 MG delayed-release capsule Take 1 Cap by mouth 2 Times a Day. 180 Cap 3   • amlodipine (NORVASC) 5 MG Tab TAKE 1 TABLET BY MOUTH EVERY DAY 90 Tab 3   • pravastatin (PRAVACHOL) 10 MG Tab TAKE 1 TABLET BY MOUTH EVERY DAY 90 Tab 3   • carvedilol (COREG) 3.125 MG Tab TAKE 1 TABLET BY MOUTH TWICE DAILY 180 Tab 3   • metformin (GLUCOPHAGE) 1000 MG tablet TAKE 1 TABLET BY MOUTH TWICE DAILY WITH MEALS 180 Tab 3   • potassium chloride SA (KDUR) 20 MEQ Tab CR TAKE 1 TABLET BY MOUTH EVERY DAY 90 Tab 3   • tiotropium (SPIRIVA HANDIHALER) 18 MCG Cap INHALE 1 CAPSULE BY MOUTH VIA HANDIHALER EVERY DAY 90 Cap 3   • budesonide-formoterol (SYMBICORT) 160-4.5 MCG/ACT Aerosol Inhale 2 Puffs by mouth 2 Times a Day. Use spacer. Rinse mouth after each use. 1 Inhaler 11   • predniSONE (DELTASONE) 10 MG Tab Take 40mg x 4 days, then take 30mg x 4 days, then take 20mg x 4 days, then 10mg x 4 days with food, then discontinue. 40 Tab 1   • azithromycin (ZITHROMAX) 250 MG Tab Take 2 tablets on day 1, then take 1 tablet a day for 4 days. 6 Tab 0   • sertraline (ZOLOFT) 50 MG Tab TAKE 1 TABLET BY MOUTH EVERY DAY 90 Tab 3   • tamsulosin (FLOMAX) 0.4 MG capsule Take 1 Cap by mouth every day. 90 Cap 3   • lisinopril (PRINIVIL) 20 MG Tab Take 1 Tab by mouth every day. 90 Tab 3   • aspirin (ASA) 325 MG Tab Take 325 mg by mouth every day.     • NICOTINE STEP 2 TD Apply  to skin as directed.     • ondansetron (ZOFRAN ODT) 4 MG TABLET DISPERSIBLE Take 1 Tab by mouth every 8 hours as needed for Nausea/Vomiting. 10 Tab 0   • albuterol (VENTOLIN OR PROVENTIL) 108 (90 BASE) MCG/ACT Aero Soln inhalation aerosol Inhale 2 Puffs by mouth every 6 hours as needed. 8.5 g 6     No current facility-administered medications for this visit.     He  has a past medical history of Aortic stenosis; Wound; Hypertension; Bronchitis; COPD; CHF (congestive heart failure) (CMS-[facility]) (6/11/2010); Tobacco abuse (6/11/2010); COPD (chronic  "obstructive pulmonary disease) (CMS-HCC); Severe aortic stenosis (12/17/2013); Hepatitis C (1/16/2014); Other specified disorder of intestines; Indigestion; Heart burn; Other emphysema (CMS-HCC); Breath shortness; Psychiatric problem; MRSA (methicillin resistant Staphylococcus aureus); and Cirrhosis (CMS-HCC) (2014). He also has no past medical history of Unspecified hemorrhagic conditions, Personal history of venous thrombosis and embolism, Cancer (CMS-HCC), Diabetes, Unspecified disorder of thyroid, Glaucoma, CATARACT, Stroke (CMS-HCC), Seizure (CMS-HCC), Myocardial infarct (CMS-HCC), Arrhythmia, Pacemaker, Angina, Heart murmur, Rheumatic fever, ASTHMA, Pneumonia, Jaundice, Unspecified urinary incontinence, Renal disorder, Dialysis, Other specified symptom associated with female genital organs, Arthritis, or Backpain.    ROS   No chest pain, no shortness of breath, no abdominal pain       Objective:     Blood pressure 128/86, pulse 94, temperature 36.3 °C (97.3 °F), resp. rate 16, height 1.778 m (5' 10\"), weight 81.647 kg (180 lb), SpO2 92 %. Body mass index is 25.83 kg/(m^2).   Physical Exam:  Constitutional: Alert, no distress. Supplemental oxygen in place today.  Skin: Warm, dry, good turgor, no rashes in visible areas.  Eye: Equal, round and reactive, conjunctiva clear, lids normal.  Psych: Alert and oriented x3, normal affect and mood.        Assessment and Plan:   The following treatment plan was discussed    1. Controlled type 2 diabetes mellitus without complication, without long-term current use of insulin (CMS-HCC)  Controlled. Continue current medication.  - POCT Hemoglobin A1C  - COMP METABOLIC PANEL; Future  - HEMOGLOBIN A1C; Future  - MICROALBUMIN CREAT RATIO URINE; Future  - LIPID PROFILE; Future    2. Chronic bilateral low back pain without sciatica  Uncontrolled. Increase Percocet to 10 mg 4 times daily for 3 months. Follow-up in 3 months for additional refills.  - oxycodone-acetaminophen " (PERCOCET-10)  MG Tab; Take 1 Tab by mouth every 6 hours as needed for Severe Pain. May refill 9/1/17  Dispense: 120 Tab; Refill: 0    3. Tobacco abuse  Uncontrolled. Prescription for NicoDerm pach.  - nicotine (NICODERM) 21 MG/24HR PATCH 24 HR; Apply 1 Patch to skin as directed every 24 hours.  Dispense: 30 Patch; Refill: 5    4. Gastroesophageal reflux disease, esophagitis presence not specified  Uncontrolled. Restart omeprazole.      Followup: Return in about 3 months (around 10/6/2017) for Pain medication refill, Short.

## 2017-07-06 NOTE — ASSESSMENT & PLAN NOTE
Chronic back pain. Patient has known wedge fracture in Thoracic spine, recently seen on x-ray.  Patient states that Percocet 7.5 mg tablets, 4 times daily, are not effective at controlling his pain.  Current pain control: good, 4/10  Adverse effects: none.  Physical functioning: good  Mood: fair  Family and social relationships: fair  Sleep pattern: good  Overall functioning: fair  Nonnarcotic treatments that are being used: nothing right now, has tried topical OTC pain relievers    Pain management agreement initiated and signed on: June 2016  Last dose of narcotic medication: 8 am this morning  Most recent urine drug screen done March 2017, which was reviewed today and had Xanax in blood which he uses infrequently.  Aberrant Behaviors: None  I have reviewed the medical records, the Prescription Monitoring Program and I have determined that percocet 10, 4 times daily, is medically indicated.    I have advised patient to keep medication in a safe place and to not drive with medication.

## 2017-07-06 NOTE — MR AVS SNAPSHOT
"        Mumtaz Jimenez Rommel   2017 1:20 PM   Office Visit   MRN: [id number]    Department:  [facility]   Dept Phone:  [phone]    Description:  Male : 1950   Provider:  Marlo Reynolds M.D.           Reason for Visit     Follow-Up           Allergies as of 2017     No Known Allergies      You were diagnosed with     Controlled type 2 diabetes mellitus without complication, without long-term current use of insulin (CMS-HCC)   [1886490]       Chronic bilateral low back pain without sciatica   [3097852]       Tobacco abuse   [249325]       Gastroesophageal reflux disease, esophagitis presence not specified   [7077986]         Vital Signs     Blood Pressure Pulse Temperature Respirations Height Weight    128/86 mmHg 94 36.3 °C (97.3 °F) 16 1.778 m (5' 10\") 81.647 kg (180 lb)    Body Mass Index Oxygen Saturation Smoking Status             25.83 kg/m2 92% Current Some Day Smoker         Basic Information     Date Of Birth Sex Race Ethnicity Preferred Language    1950 Male White Non- English      Problem List              ICD-10-CM Priority Class Noted - Resolved    COPD (chronic obstructive pulmonary disease) (CMS-HCC) (Chronic) J44.9   2010 - Present    Tobacco abuse (Chronic) Z72.0   2010 - Present    HTN (hypertension) (Chronic) I10   2010 - Present    Hx of drug abuse Z87.898   2013 - Present    Elevated liver enzymes R74.8   2013 - Present    BPH (benign prostatic hyperplasia) N40.0   2013 - Present    Hepatitis C B19.20   2014 - Present    Cirrhosis (CMS-HCC) K74.60   2014 - Present    Diabetes mellitus type 2, controlled (CMS-HCC) E11.9   2014 - Present    Insomnia G47.00   2014 - Present    Chronic lower back pain M54.5, G89.29   2014 - Present    Hyperlipidemia E78.5   3/18/2015 - Present    MDD (major depressive disorder), recurrent episode, mild (CMS-HCC) F33.0   2015 - Present    Right hip pain M25.551   " 9/15/2015 - Present    Drug-induced hypokalemia E87.6   2/28/2016 - Present    Chronic respiratory failure with hypoxia (CMS-HCC) J96.11   5/23/2016 - Present    Weight gain R63.5   1/6/2017 - Present    Chronic use of opiate drugs therapeutic purposes Z79.891   4/6/2017 - Present    GERD (gastroesophageal reflux disease) K21.9   7/6/2017 - Present      Health Maintenance        Date Due Completion Dates    IMM DTaP/Tdap/Td Vaccine (1 - Tdap) 11/12/1968 ---    IMM ZOSTER VACCINE 11/12/2009 ---    DIABETES MONOFILAMENT / LE EXAM 3/18/2016 3/18/2015, 8/28/2014    A1C SCREENING 7/26/2017 1/26/2017, 8/27/2016, 2/19/2016, 9/9/2015, 6/22/2015, 6/16/2015, 3/3/2015, 3/3/2015, 7/18/2014, 8/8/2012    FASTING LIPID PROFILE 8/27/2017 8/27/2016, 2/19/2016, 9/9/2015, 6/16/2015, 3/3/2015, 3/3/2015, 9/10/2014    URINE ACR / MICROALBUMIN 8/27/2017 8/27/2016, 2/19/2016, 3/3/2015, 9/10/2014 (Done)    Override on 9/10/2014: Done    IMM INFLUENZA (1) 9/1/2017 ---    SERUM CREATININE 11/22/2017 11/22/2016, 9/7/2016, 8/27/2016, 2/19/2016, 9/9/2015, 6/16/2015, 3/3/2015, 3/3/2015, 9/10/2014, 7/23/2014, 7/20/2014, 7/19/2014, 7/19/2014, 7/18/2014, 7/18/2014, 7/18/2014, 8/8/2012, 6/11/2010, 7/16/2009, 5/1/2006, 4/22/2006, 2/15/2005    RETINAL SCREENING 4/6/2018 4/6/2017    COLONOSCOPY 7/10/2019 7/10/2014    IMM PNEUMOCOCCAL 65+ (ADULT) LOW/MEDIUM RISK SERIES (2 of 2 - PPSV23) 7/23/2019 8/29/2016, 7/23/2014            Results     POCT Hemoglobin A1C      Component    Glycohemoglobin    6.2    Internal Control Negative    Negative    Internal Control Positive    Positive                        Current Immunizations     13-VALENT PCV PREVNAR 8/29/2016    Pneumococcal polysaccharide vaccine (PPSV-23) 7/23/2014  9:23 AM      Below and/or attached are the medications your provider expects you to take. Review all of your home medications and newly ordered medications with your provider and/or pharmacist. Follow medication instructions as directed by  your provider and/or pharmacist. Please keep your medication list with you and share with your provider. Update the information when medications are discontinued, doses are changed, or new medications (including over-the-counter products) are added; and carry medication information at all times in the event of emergency situations     Allergies:  No Known Allergies          Medications  Valid as of: July 06, 2017 -  2:58 PM    Generic Name Brand Name Tablet Size Instructions for use    Albuterol Sulfate (Aero Soln) albuterol 108 (90 BASE) MCG/ACT Inhale 2 Puffs by mouth every 6 hours as needed.        AmLODIPine Besylate (Tab) NORVASC 5 MG TAKE 1 TABLET BY MOUTH EVERY DAY        Aspirin (Tab)  MG Take 325 mg by mouth every day.        Azithromycin (Tab) ZITHROMAX 250 MG Take 2 tablets on day 1, then take 1 tablet a day for 4 days.        Budesonide-Formoterol Fumarate (Aerosol) SYMBICORT 160-4.5 MCG/ACT Inhale 2 Puffs by mouth 2 Times a Day. Use spacer. Rinse mouth after each use.        Carvedilol (Tab) COREG 3.125 MG TAKE 1 TABLET BY MOUTH TWICE DAILY        Furosemide (Tab) LASIX 20 MG Take 1 Tab by mouth every day.        Lisinopril (Tab) PRINIVIL 20 MG Take 1 Tab by mouth every day.        MetFORMIN HCl (Tab) GLUCOPHAGE 1000 MG TAKE 1 TABLET BY MOUTH TWICE DAILY WITH MEALS        Nicotine   Apply  to skin as directed.        Nicotine (PATCH 24 HR) NICODERM 21 MG/24HR Apply 1 Patch to skin as directed every 24 hours.        Omeprazole (CAPSULE DELAYED RELEASE) PRILOSEC 20 MG Take 1 Cap by mouth 2 Times a Day.        Ondansetron (TABLET DISPERSIBLE) ZOFRAN ODT 4 MG Take 1 Tab by mouth every 8 hours as needed for Nausea/Vomiting.        Oxycodone-Acetaminophen (Tab) PERCOCET-10  MG Take 1 Tab by mouth every 6 hours as needed for Severe Pain. May refill 9/1/17        Potassium Chloride Abi CR (Tab CR) Kdur 20 MEQ TAKE 1 TABLET BY MOUTH EVERY DAY        Pravastatin Sodium (Tab) PRAVACHOL 10 MG TAKE 1  TABLET BY MOUTH EVERY DAY        PredniSONE (Tab) DELTASONE 10 MG Take 40mg x 4 days, then take 30mg x 4 days, then take 20mg x 4 days, then 10mg x 4 days with food, then discontinue.        Sertraline HCl (Tab) ZOLOFT 50 MG TAKE 1 TABLET BY MOUTH EVERY DAY        Tamsulosin HCl (Cap) FLOMAX 0.4 MG Take 1 Cap by mouth every day.        Tiotropium Bromide Monohydrate (Cap) SPIRIVA 18 MCG INHALE 1 CAPSULE BY MOUTH VIA HANDIHALER EVERY DAY        .                 Medicines prescribed today were sent to:     [email or web address] [address] [place] [address], [place] - [address] [facility] AT [facility] & [place]    [address] [facility] [address]    Phone: [phone] Fax: [phone]    Open 24 Hours?: Yes      Medication refill instructions:       If your prescription bottle indicates you have medication refills left, it is not necessary to call your provider’s office. Please contact your pharmacy and they will refill your medication.    If your prescription bottle indicates you do not have any refills left, you may request refills at any time through one of the following ways: The online [facility] system (except Urgent Care), by calling your provider’s office, or by asking your pharmacy to contact your provider’s office with a refill request. Medication refills are processed only during regular business hours and may not be available until the next business day. Your provider may request additional information or to have a follow-up visit with you prior to refilling your medication.   *Please Note: Medication refills are assigned a new Rx number when refilled electronically. Your pharmacy may indicate that no refills were authorized even though a new prescription for the same medication is available at the pharmacy. Please request the medicine by name with the pharmacy before contacting your provider for a refill.        Your To Do List     Future Labs/Procedures Complete By Expires    COMP METABOLIC PANEL  As directed 7/7/2018     HEMOGLOBIN A1C  As directed 7/7/2018    LIPID PROFILE  As directed 7/7/2018    MICROALBUMIN CREAT RATIO URINE  As directed 7/7/2018      Other Notes About Your Plan     Enrolled in [facility] Team with Dr. Reynolds.           Ema Status: Patient Declined        Quit Tobacco Information     Do you want to quit using tobacco?    Quitting tobacco decreases risks of cancer, heart and lung disease, increases life expectancy, improves sense of taste and smell, and increases spending money, among other benefits.    If you are thinking about quitting, we can help.  • [facility] Quit Tobacco Program: [phone]  o Program occurs weekly for four weeks and includes pharmacist consultation on products to support quitting smoking or chewing tobacco. A provider referral is needed for pharmacist consultation.  • Tobacco Users Help Hotline: 8-747-QUITNOW (319-1503) or https://[place].quitlogix.org/  o Free, confidential telephone and online coaching for [place] residents. Sessions are designed on a schedule that is convenient for you. Eligible clients receive free nicotine replacement therapy.  • Nationally: www.smokefree.gov  o Information and professional assistance to support both immediate and long-term needs as you become, and remain, a non-smoker. Smokefree.gov allows you to choose the help that best fits your needs.

## 2017-07-06 NOTE — TELEPHONE ENCOUNTER
"1. Caller Name: Mumtaz Carney                                         Call Back Number: [phone]       Patient approves a detailed voicemail message: yes    Pt called in and reports he tried filling his Oxycodone early and was told he cant fill this until Monday. Pt asking for a different medication to be called into his pharmacy to hold him over until Monday when he can fill his pain medication. Tried to explain to Pt that if was taking his pain medication as directed 1 pill every 6 hours he would not have run out. Pt began cussing over the phone and stated he was not stuck without a ride due to waiting at the pharmacy and that he was tired of this \"sh*t and was going to go get Fu**ing drunk\" Pt then hung up the phone.     "

## 2017-07-14 RX ORDER — LISINOPRIL 10 MG/1
TABLET ORAL
Qty: 90 TAB | Refills: 3 | Status: SHIPPED | OUTPATIENT
Start: 2017-07-14 | End: 2018-08-02 | Stop reason: SDUPTHER

## 2017-08-23 DIAGNOSIS — M54.50 CHRONIC BILATERAL LOW BACK PAIN WITHOUT SCIATICA: ICD-10-CM

## 2017-08-23 DIAGNOSIS — G89.29 CHRONIC BILATERAL LOW BACK PAIN WITHOUT SCIATICA: ICD-10-CM

## 2017-08-23 RX ORDER — OXYCODONE AND ACETAMINOPHEN 10; 325 MG/1; MG/1
1 TABLET ORAL EVERY 6 HOURS PRN
Qty: 120 TAB | Refills: 0 | OUTPATIENT
Start: 2017-08-23

## 2017-08-23 NOTE — TELEPHONE ENCOUNTER
Was the patient seen in the last year in this department? Yes     Does patient have an active prescription for medications requested? No     Received Request Via: Pharmacy

## 2017-08-23 NOTE — TELEPHONE ENCOUNTER
Refill declined. Patient was given a refill at our last appointment that he can fill September 1.  His last refill was August 8 for 120 tablets, that should last him at least one month.  Marlo Reynolds M.D.

## 2017-08-28 NOTE — TELEPHONE ENCOUNTER
Pt called back and reports he has two pills left and is not able to have a refill until September 2nd. Pt asking if he can have an early refill or four days worth to get him by until his regular refill. Pt states he is having a very hard time and does not want to go through with drawls as he has suffered with this before.

## 2017-09-13 ENCOUNTER — HOSPITAL ENCOUNTER (OUTPATIENT)
Dept: LAB | Facility: MEDICAL CENTER | Age: 67
End: 2017-09-13
Attending: FAMILY MEDICINE
Payer: MEDICARE

## 2017-09-13 DIAGNOSIS — E11.9 CONTROLLED TYPE 2 DIABETES MELLITUS WITHOUT COMPLICATION, WITHOUT LONG-TERM CURRENT USE OF INSULIN (HCC): ICD-10-CM

## 2017-09-13 LAB
ALBUMIN SERPL BCP-MCNC: 4.7 G/DL (ref 3.2–4.9)
ALBUMIN/GLOB SERPL: 1.3 G/DL
ALP SERPL-CCNC: 73 U/L (ref 30–99)
ALT SERPL-CCNC: 22 U/L (ref 2–50)
ANION GAP SERPL CALC-SCNC: 9 MMOL/L (ref 0–11.9)
AST SERPL-CCNC: 19 U/L (ref 12–45)
BILIRUB SERPL-MCNC: 0.3 MG/DL (ref 0.1–1.5)
BUN SERPL-MCNC: 17 MG/DL (ref 8–22)
CALCIUM SERPL-MCNC: 9.9 MG/DL (ref 8.5–10.5)
CHLORIDE SERPL-SCNC: 92 MMOL/L (ref 96–112)
CHOLEST SERPL-MCNC: 141 MG/DL (ref 100–199)
CO2 SERPL-SCNC: 36 MMOL/L (ref 20–33)
CREAT SERPL-MCNC: 0.85 MG/DL (ref 0.5–1.4)
CREAT UR-MCNC: 131.8 MG/DL
EST. AVERAGE GLUCOSE BLD GHB EST-MCNC: 148 MG/DL
GFR SERPL CREATININE-BSD FRML MDRD: >60 ML/MIN/1.73 M 2
GLOBULIN SER CALC-MCNC: 3.6 G/DL (ref 1.9–3.5)
GLUCOSE SERPL-MCNC: 136 MG/DL (ref 65–99)
HBA1C MFR BLD: 6.8 % (ref 0–5.6)
HDLC SERPL-MCNC: 39 MG/DL
LDLC SERPL CALC-MCNC: 76 MG/DL
MICROALBUMIN UR-MCNC: 1.9 MG/DL
MICROALBUMIN/CREAT UR: 14 MG/G (ref 0–30)
POTASSIUM SERPL-SCNC: 4.4 MMOL/L (ref 3.6–5.5)
PROT SERPL-MCNC: 8.3 G/DL (ref 6–8.2)
SODIUM SERPL-SCNC: 137 MMOL/L (ref 135–145)
TRIGL SERPL-MCNC: 129 MG/DL (ref 0–149)

## 2017-09-13 PROCEDURE — 83036 HEMOGLOBIN GLYCOSYLATED A1C: CPT | Mod: GA

## 2017-09-13 PROCEDURE — 36415 COLL VENOUS BLD VENIPUNCTURE: CPT

## 2017-09-13 PROCEDURE — 82043 UR ALBUMIN QUANTITATIVE: CPT

## 2017-09-13 PROCEDURE — 80053 COMPREHEN METABOLIC PANEL: CPT

## 2017-09-13 PROCEDURE — 80061 LIPID PANEL: CPT

## 2017-09-13 PROCEDURE — 82570 ASSAY OF URINE CREATININE: CPT

## 2017-09-18 ENCOUNTER — OFFICE VISIT (OUTPATIENT)
Dept: MEDICAL GROUP | Facility: MEDICAL CENTER | Age: 67
End: 2017-09-18
Payer: MEDICARE

## 2017-09-18 VITALS
WEIGHT: 180 LBS | DIASTOLIC BLOOD PRESSURE: 78 MMHG | SYSTOLIC BLOOD PRESSURE: 128 MMHG | TEMPERATURE: 97 F | HEIGHT: 70 IN | HEART RATE: 114 BPM | RESPIRATION RATE: 16 BRPM | OXYGEN SATURATION: 96 % | BODY MASS INDEX: 25.77 KG/M2

## 2017-09-18 DIAGNOSIS — M54.50 CHRONIC MIDLINE LOW BACK PAIN WITHOUT SCIATICA: ICD-10-CM

## 2017-09-18 DIAGNOSIS — E11.9 CONTROLLED TYPE 2 DIABETES MELLITUS WITHOUT COMPLICATION, WITHOUT LONG-TERM CURRENT USE OF INSULIN (HCC): ICD-10-CM

## 2017-09-18 DIAGNOSIS — G89.29 CHRONIC MIDLINE LOW BACK PAIN WITHOUT SCIATICA: ICD-10-CM

## 2017-09-18 DIAGNOSIS — Z23 NEED FOR IMMUNIZATION AGAINST INFLUENZA: ICD-10-CM

## 2017-09-18 PROBLEM — R63.5 WEIGHT GAIN: Status: RESOLVED | Noted: 2017-01-06 | Resolved: 2017-09-18

## 2017-09-18 PROCEDURE — 99214 OFFICE O/P EST MOD 30 MIN: CPT | Mod: 25 | Performed by: FAMILY MEDICINE

## 2017-09-18 PROCEDURE — G0008 ADMIN INFLUENZA VIRUS VAC: HCPCS | Performed by: FAMILY MEDICINE

## 2017-09-18 PROCEDURE — 99999 PR NO CHARGE: CPT | Performed by: FAMILY MEDICINE

## 2017-09-18 PROCEDURE — 90662 IIV NO PRSV INCREASED AG IM: CPT | Performed by: FAMILY MEDICINE

## 2017-09-18 RX ORDER — OXYCODONE AND ACETAMINOPHEN 10; 325 MG/1; MG/1
1 TABLET ORAL EVERY 6 HOURS PRN
Qty: 120 TAB | Refills: 0 | Status: SHIPPED | OUTPATIENT
Start: 2017-09-18 | End: 2017-10-16 | Stop reason: SDUPTHER

## 2017-09-18 ASSESSMENT — PAIN SCALES - GENERAL: PAINLEVEL: 4=SLIGHT-MODERATE PAIN

## 2017-09-18 NOTE — PROGRESS NOTES
Subjective:   Mumtaz Carney is a 66 y.o. male here today for lower back pain, diabetes    Chronic lower back pain  Chronic back pain. Patient has known wedge fracture in Thoracic spine, recently seen on x-ray.  Patient states that Percocet 10 mg tablets, 4 times daily, are not effective at controlling his pain.  Current pain control: good, 4/10  Adverse effects: none.  Physical functioning: good  Mood: fair  Family and social relationships: fair  Sleep pattern: good  Overall functioning: fair  Nonnarcotic treatments that are being used: nothing right now, has tried topical OTC pain relievers    Pain management agreement initiated and signed on: June 2016  Last dose of narcotic medication: 8 am this morning  Most recent urine drug screen done March 2017, which was reviewed today and had Xanax in blood which he uses infrequently.  Aberrant Behaviors: None  I have reviewed the medical records, the Prescription Monitoring Program and I have determined that percocet 10, 4 times daily, is medically indicated.    I have advised patient to keep medication in a safe place and to not drive with medication.    Diabetes mellitus type 2, controlled  Patient is tolerating metformin 1000 mg twice a day. He is no longer using insulin. Patient is periodically checking blood sugar and is blood sugar is around 120. A1c of 6.8, up from 6.2.         Current medicines (including changes today)  Current Outpatient Prescriptions   Medication Sig Dispense Refill   • oxycodone-acetaminophen (PERCOCET-10)  MG Tab Take 1 Tab by mouth every 6 hours as needed for Severe Pain. 120 Tab 0   • lisinopril (PRINIVIL) 10 MG Tab TAKE 1 TABLET BY MOUTH EVERY DAY 90 Tab 3   • nicotine (NICODERM) 21 MG/24HR PATCH 24 HR Apply 1 Patch to skin as directed every 24 hours. 30 Patch 5   • furosemide (LASIX) 20 MG Tab Take 1 Tab by mouth every day. 90 Tab 3   • omeprazole (PRILOSEC) 20 MG delayed-release capsule Take 1 Cap by mouth 2 Times a Day. 180  Cap 3   • amlodipine (NORVASC) 5 MG Tab TAKE 1 TABLET BY MOUTH EVERY DAY 90 Tab 3   • pravastatin (PRAVACHOL) 10 MG Tab TAKE 1 TABLET BY MOUTH EVERY DAY 90 Tab 3   • carvedilol (COREG) 3.125 MG Tab TAKE 1 TABLET BY MOUTH TWICE DAILY 180 Tab 3   • metformin (GLUCOPHAGE) 1000 MG tablet TAKE 1 TABLET BY MOUTH TWICE DAILY WITH MEALS 180 Tab 3   • potassium chloride SA (KDUR) 20 MEQ Tab CR TAKE 1 TABLET BY MOUTH EVERY DAY 90 Tab 3   • tiotropium (SPIRIVA HANDIHALER) 18 MCG Cap INHALE 1 CAPSULE BY MOUTH VIA HANDIHALER EVERY DAY 90 Cap 3   • budesonide-formoterol (SYMBICORT) 160-4.5 MCG/ACT Aerosol Inhale 2 Puffs by mouth 2 Times a Day. Use spacer. Rinse mouth after each use. 1 Inhaler 11   • predniSONE (DELTASONE) 10 MG Tab Take 40mg x 4 days, then take 30mg x 4 days, then take 20mg x 4 days, then 10mg x 4 days with food, then discontinue. 40 Tab 1   • azithromycin (ZITHROMAX) 250 MG Tab Take 2 tablets on day 1, then take 1 tablet a day for 4 days. 6 Tab 0   • sertraline (ZOLOFT) 50 MG Tab TAKE 1 TABLET BY MOUTH EVERY DAY 90 Tab 3   • tamsulosin (FLOMAX) 0.4 MG capsule Take 1 Cap by mouth every day. 90 Cap 3   • aspirin (ASA) 325 MG Tab Take 325 mg by mouth every day.     • NICOTINE STEP 2 TD Apply  to skin as directed.     • ondansetron (ZOFRAN ODT) 4 MG TABLET DISPERSIBLE Take 1 Tab by mouth every 8 hours as needed for Nausea/Vomiting. 10 Tab 0   • albuterol (VENTOLIN OR PROVENTIL) 108 (90 BASE) MCG/ACT Aero Soln inhalation aerosol Inhale 2 Puffs by mouth every 6 hours as needed. 8.5 g 6     No current facility-administered medications for this visit.      He  has a past medical history of Aortic stenosis; Breath shortness; Bronchitis; CHF (congestive heart failure) (CMS-HCC) (6/11/2010); Cirrhosis (CMS-[facility]) (2014); COPD; COPD (chronic obstructive pulmonary disease) (CMS-[facility]); Heart burn; Hepatitis C (1/16/2014); Hypertension; Indigestion; MRSA (methicillin resistant Staphylococcus aureus); Other emphysema (CMS-[facility]);  "Other specified disorder of intestines; Psychiatric problem; Severe aortic stenosis (12/17/2013); Tobacco abuse (6/11/2010); and Wound. He also has no past medical history of Angina; Arrhythmia; Arthritis; ASTHMA; Backpain; Cancer (CMS-HCC); CATARACT; Diabetes; Dialysis; Glaucoma; Heart murmur; Jaundice; Myocardial infarct (CMS-HCC); Other specified symptom associated with female genital organs; Pacemaker; Personal history of venous thrombosis and embolism; Pneumonia; Renal disorder; Rheumatic fever; Seizure (CMS-HCC); Stroke (CMS-HCC); Unspecified disorder of thyroid; Unspecified hemorrhagic conditions; or Unspecified urinary incontinence.    ROS   No chest pain, no shortness of breath       Objective:     Blood pressure 128/78, pulse (!) 114, temperature 36.1 °C (97 °F), resp. rate 16, height 1.778 m (5' 10\"), weight 81.6 kg (180 lb), SpO2 96 %. Body mass index is 25.83 kg/m².   Physical Exam:  Constitutional: Alert, no distress. Supplemental oxygen in place.  Skin: Warm, dry, good turgor, no rashes in visible areas.  Eye: Equal, round and reactive, conjunctiva clear, lids normal.  Psych: Alert and oriented x3, normal affect and mood.        Assessment and Plan:   The following treatment plan was discussed    1. Controlled type 2 diabetes mellitus without complication, without long-term current use of insulin (CMS-HCC)  Controlled. Continue metformin. Check labs and follow-up in 6 months.  - COMP METABOLIC PANEL; Future  - HEMOGLOBIN A1C; Future  - MICROALBUMIN CREAT RATIO URINE; Future  - LIPID PROFILE; Future    2. Chronic midline low back pain without sciatica  Uncontrolled. Refill Percocet. Referral to pain management.  - REFERRAL TO PAIN CLINIC  - oxycodone-acetaminophen (PERCOCET-10)  MG Tab; Take 1 Tab by mouth every 6 hours as needed for Severe Pain.  Dispense: 120 Tab; Refill: 0    3. Need for immunization against influenza  - INFLUENZA VACCINE, HIGH DOSE (65+ ONLY)      Followup: Return in about 6 " months (around 3/18/2018) for Diabetes, Short.

## 2017-09-18 NOTE — ASSESSMENT & PLAN NOTE
Chronic back pain. Patient has known wedge fracture in Thoracic spine, recently seen on x-ray.  Patient states that Percocet 10 mg tablets, 4 times daily, are not effective at controlling his pain.  Current pain control: good, 4/10  Adverse effects: none.  Physical functioning: good  Mood: fair  Family and social relationships: fair  Sleep pattern: good  Overall functioning: fair  Nonnarcotic treatments that are being used: nothing right now, has tried topical OTC pain relievers    Pain management agreement initiated and signed on: June 2016  Last dose of narcotic medication: 8 am this morning  Most recent urine drug screen done March 2017, which was reviewed today and had Xanax in blood which he uses infrequently.  Aberrant Behaviors: None  I have reviewed the medical records, the Prescription Monitoring Program and I have determined that percocet 10, 4 times daily, is medically indicated.    I have advised patient to keep medication in a safe place and to not drive with medication.

## 2017-09-18 NOTE — ASSESSMENT & PLAN NOTE
Patient is tolerating metformin 1000 mg twice a day. He is no longer using insulin. Patient is periodically checking blood sugar and is blood sugar is around 120. A1c of 6.8, up from 6.2.

## 2017-09-22 ENCOUNTER — PATIENT OUTREACH (OUTPATIENT)
Dept: HEALTH INFORMATION MANAGEMENT | Facility: OTHER | Age: 67
End: 2017-09-22

## 2017-09-22 ENCOUNTER — TELEPHONE (OUTPATIENT)
Dept: HEALTH INFORMATION MANAGEMENT | Facility: OTHER | Age: 67
End: 2017-09-22

## 2017-09-22 NOTE — PROGRESS NOTES
CC received call from patient stating paramedics made a visit.  Reports his blood pressure was 130/80 and blood sugar is 155.  Patient states he did eat lunch and will continue to monitor blood sugars.  CC again informed patient to not take any further insulin.  Recommended patient take medications as prescribed by his PCP.  Patient voiced understanding.

## 2017-09-22 NOTE — PROGRESS NOTES
Patient left a message on CC telephone requesting call back regarding his diabetes management.  CC returned patient call.  Patient reports last night he checked his blood sugar and had a reading of 300.  Reports he took 40 units of Lantus insulin at 3:00 am and another 40 units of Lantus at 8:30 am for a blood sugar reading of 141.  Patient also thinks he may have taken 40 units of Lantus last evening.  Patient is vague on telling CC how many times he took 40 units of Lantus.  CC instructed patient to not take any further doses of Lantus insulin.  CC did not see any insulin listed on patients current medication list.  Patient states the insulin is old and he decided to take it because of elevated blood sugar reading of 300.  Patient is not able to tell CC any other details about why he decided to take the insulin.     Patient states he is now feeling shaky and nervous.  CC instructed patient to get some juice or glucose tablets.  Patient states he doesn't have any glucose or juice but does have some milk.  CC recommended patient drink some milk and check a blood sugar reading.  Patient took reading while talking to CC.  Patient states reading is 101. CC encouraged patient to eat some lunch.  Patient voiced understanding.   Patient states he is having withdrawals from not having his narcotics.  States his last dose of pain medication was on Wednesday.  Patient reports he called PCP office today to inform of taking insulin and narcotic withdrawals.  CC informed patient she would call paramedics to evaluate patient.      CC contacted [facility] dispatch to send paramedics to patients residence.  CC will send update to PCP.

## 2017-09-22 NOTE — TELEPHONE ENCOUNTER
Patient left a message on CC telephone requesting call back regarding his diabetes management.  CC returned patient call.  Patient reports last night he checked his blood sugar and had a reading of 300.  Reports he took 40 units of Lantus insulin at 3:00 am and another 40 units of Lantus at 8:30 am for a blood sugar reading of 141.  Patient also thinks he may have taken 40 units of Lantus last evening.  Patient is vague on telling CC how many times he took 40 units of Lantus.  CC instructed patient to not take any further doses of Lantus insulin.  CC did not see any insulin listed on patients current medication list.  Patient states the insulin is old and he decided to take it because of elevated blood sugar reading of 300.  Patient is not able to tell CC any other details about why he decided to take the insulin.      Patient states he is now feeling shaky and nervous.  CC instructed patient to get some juice or glucose tablets.  Patient states he doesn't have any glucose or juice but does have some milk.  CC recommended patient drink some milk and check a blood sugar reading.  Patient took reading while talking to CC.  Patient states reading is 101. CC encouraged patient to eat some lunch.  Patient voiced understanding.   Patient states he is having withdrawals from not having his narcotics.  States his last dose of pain medication was on Wednesday.  Patient reports he called PCP office today to inform of taking insulin and narcotic withdrawals.  CC informed patient she would call paramedics to evaluate patient.       CC contacted [facility] dispatch to send paramedics to patients residence.  CC will send update to PCP.

## 2017-10-16 ENCOUNTER — OFFICE VISIT (OUTPATIENT)
Dept: MEDICAL GROUP | Facility: MEDICAL CENTER | Age: 67
End: 2017-10-16
Payer: MEDICARE

## 2017-10-16 VITALS
TEMPERATURE: 97.9 F | BODY MASS INDEX: 25.05 KG/M2 | HEIGHT: 70 IN | SYSTOLIC BLOOD PRESSURE: 130 MMHG | OXYGEN SATURATION: 90 % | HEART RATE: 87 BPM | DIASTOLIC BLOOD PRESSURE: 72 MMHG | RESPIRATION RATE: 16 BRPM | WEIGHT: 175 LBS

## 2017-10-16 DIAGNOSIS — Z72.0 TOBACCO ABUSE: Chronic | ICD-10-CM

## 2017-10-16 DIAGNOSIS — E11.9 CONTROLLED TYPE 2 DIABETES MELLITUS WITHOUT COMPLICATION, WITHOUT LONG-TERM CURRENT USE OF INSULIN (HCC): ICD-10-CM

## 2017-10-16 DIAGNOSIS — J44.1 COPD WITH EXACERBATION (HCC): ICD-10-CM

## 2017-10-16 DIAGNOSIS — M54.50 CHRONIC MIDLINE LOW BACK PAIN WITHOUT SCIATICA: ICD-10-CM

## 2017-10-16 DIAGNOSIS — G89.29 CHRONIC MIDLINE LOW BACK PAIN WITHOUT SCIATICA: ICD-10-CM

## 2017-10-16 DIAGNOSIS — E78.00 PURE HYPERCHOLESTEROLEMIA: ICD-10-CM

## 2017-10-16 PROCEDURE — 99214 OFFICE O/P EST MOD 30 MIN: CPT | Performed by: FAMILY MEDICINE

## 2017-10-16 RX ORDER — PREDNISONE 20 MG/1
40 TABLET ORAL DAILY
Qty: 10 TAB | Refills: 0 | Status: SHIPPED | OUTPATIENT
Start: 2017-10-16 | End: 2017-10-21

## 2017-10-16 RX ORDER — OXYCODONE AND ACETAMINOPHEN 10; 325 MG/1; MG/1
1 TABLET ORAL EVERY 6 HOURS PRN
Qty: 120 TAB | Refills: 0 | Status: SHIPPED | OUTPATIENT
Start: 2017-10-16 | End: 2018-01-02 | Stop reason: SDUPTHER

## 2017-10-16 RX ORDER — OXYCODONE AND ACETAMINOPHEN 10; 325 MG/1; MG/1
1 TABLET ORAL EVERY 6 HOURS PRN
Qty: 120 TAB | Refills: 0 | Status: SHIPPED | OUTPATIENT
Start: 2017-10-16 | End: 2017-10-16 | Stop reason: SDUPTHER

## 2017-10-16 RX ORDER — AZITHROMYCIN 250 MG/1
TABLET, FILM COATED ORAL
Qty: 6 TAB | Refills: 0 | Status: SHIPPED | OUTPATIENT
Start: 2017-10-16 | End: 2018-03-30 | Stop reason: SDUPTHER

## 2017-10-16 RX ORDER — ALBUTEROL SULFATE 90 UG/1
2 AEROSOL, METERED RESPIRATORY (INHALATION) EVERY 6 HOURS PRN
Qty: 8.5 G | Refills: 6 | Status: SHIPPED | OUTPATIENT
Start: 2017-10-16 | End: 2018-11-21 | Stop reason: SDUPTHER

## 2017-10-16 RX ORDER — BUDESONIDE AND FORMOTEROL FUMARATE DIHYDRATE 160; 4.5 UG/1; UG/1
2 AEROSOL RESPIRATORY (INHALATION) 2 TIMES DAILY
Qty: 1 INHALER | Refills: 11 | Status: SHIPPED | OUTPATIENT
Start: 2017-10-16 | End: 2018-12-18 | Stop reason: SDUPTHER

## 2017-10-16 RX ORDER — TIOTROPIUM BROMIDE 18 UG/1
CAPSULE ORAL; RESPIRATORY (INHALATION)
Qty: 90 CAP | Refills: 3 | Status: SHIPPED | OUTPATIENT
Start: 2017-10-16 | End: 2018-07-06

## 2017-10-16 NOTE — ASSESSMENT & PLAN NOTE
Because of recent stress related to him possibly not receiving his pain medications he has increased his smoking. He almost quit smoking the last time we saw him, now he is back up to one pack per day. His breathing has become worse as a result.

## 2017-10-16 NOTE — ASSESSMENT & PLAN NOTE
Chronic back pain. Patient has known wedge fracture in Thoracic spine, recently seen on x-ray.  Patient states that Percocet 10 mg tablets, 4 times daily, are not effective at controlling his pain.  Current pain control: good, 4/10  Adverse effects: none.  Physical functioning: good  Mood: fair  Family and social relationships: fair  Sleep pattern: good  Overall functioning: fair  Nonnarcotic treatments that are being used: nothing right now, has tried topical OTC pain relievers    Pain management agreement initiated and signed on: June 2016  Last dose of narcotic medication: this morning  Most recent urine drug screen done March 2017, which was reviewed today and had Xanax in blood which he uses infrequently.  Aberrant Behaviors: None  I have reviewed the medical records, the Prescription Monitoring Program and I have determined that percocet 10, 4 times daily, is medically indicated.    I have advised patient to keep medication in a safe place and to not drive with medication.

## 2017-10-16 NOTE — PROGRESS NOTES
Subjective:   Mumtaz Carney is a 66 y.o. male here today for Back pain, COPD    Chronic lower back pain  Chronic back pain. Patient has known wedge fracture in Thoracic spine, recently seen on x-ray.  Patient states that Percocet 10 mg tablets, 4 times daily, are not effective at controlling his pain.  Current pain control: good, 4/10  Adverse effects: none.  Physical functioning: good  Mood: fair  Family and social relationships: fair  Sleep pattern: good  Overall functioning: fair  Nonnarcotic treatments that are being used: nothing right now, has tried topical OTC pain relievers    Pain management agreement initiated and signed on: June 2016  Last dose of narcotic medication: this morning  Most recent urine drug screen done March 2017, which was reviewed today and had Xanax in blood which he uses infrequently.  Aberrant Behaviors: None  I have reviewed the medical records, the Prescription Monitoring Program and I have determined that percocet 10, 4 times daily, is medically indicated.    I have advised patient to keep medication in a safe place and to not drive with medication.    Diabetes mellitus type 2, controlled  A1c is slightly more elevated this time compared to last time. A1c is 6.8, and sat of 6.2. He is tolerating metformin 1000 mg twice daily.    Hyperlipidemia  Patient is tolerating pravastatin 10 mg daily. LDL is at 76.    Tobacco abuse  Because of recent stress related to him possibly not receiving his pain medications he has increased his smoking. He almost quit smoking the last time we saw him, now he is back up to one pack per day. His breathing has become worse as a result.         Patient is having increased mucus production, increased shortness of breath, increased cough. She requests a prescription for Z-Dino and prednisone.    Current medicines (including changes today)  Current Outpatient Prescriptions   Medication Sig Dispense Refill   • oxycodone-acetaminophen (PERCOCET-10)   MG Tab Take 1 Tab by mouth every 6 hours as needed for Severe Pain. May refill 12/18/17 120 Tab 0   • azithromycin (ZITHROMAX) 250 MG Tab Take 2 tablets on day 1, then take 1 tablet a day for 4 days. 6 Tab 0   • predniSONE (DELTASONE) 20 MG Tab Take 2 Tabs by mouth every day for 5 days. 10 Tab 0   • budesonide-formoterol (SYMBICORT) 160-4.5 MCG/ACT Aerosol Inhale 2 Puffs by mouth 2 Times a Day. Use spacer. Rinse mouth after each use. 1 Inhaler 11   • tiotropium (SPIRIVA HANDIHALER) 18 MCG Cap INHALE 1 CAPSULE BY MOUTH VIA HANDIHALER EVERY DAY 90 Cap 3   • albuterol 108 (90 Base) MCG/ACT Aero Soln inhalation aerosol Inhale 2 Puffs by mouth every 6 hours as needed. 8.5 g 6   • furosemide (LASIX) 20 MG Tab TAKE 1 TABLET BY MOUTH DAILY 90 Tab 3   • lisinopril (PRINIVIL) 10 MG Tab TAKE 1 TABLET BY MOUTH EVERY DAY 90 Tab 3   • omeprazole (PRILOSEC) 20 MG delayed-release capsule Take 1 Cap by mouth 2 Times a Day. 180 Cap 3   • amlodipine (NORVASC) 5 MG Tab TAKE 1 TABLET BY MOUTH EVERY DAY 90 Tab 3   • pravastatin (PRAVACHOL) 10 MG Tab TAKE 1 TABLET BY MOUTH EVERY DAY 90 Tab 3   • carvedilol (COREG) 3.125 MG Tab TAKE 1 TABLET BY MOUTH TWICE DAILY 180 Tab 3   • metformin (GLUCOPHAGE) 1000 MG tablet TAKE 1 TABLET BY MOUTH TWICE DAILY WITH MEALS 180 Tab 3   • potassium chloride SA (KDUR) 20 MEQ Tab CR TAKE 1 TABLET BY MOUTH EVERY DAY 90 Tab 3   • sertraline (ZOLOFT) 50 MG Tab TAKE 1 TABLET BY MOUTH EVERY DAY 90 Tab 3   • tamsulosin (FLOMAX) 0.4 MG capsule Take 1 Cap by mouth every day. 90 Cap 3   • aspirin (ASA) 325 MG Tab Take 325 mg by mouth every day.     • ondansetron (ZOFRAN ODT) 4 MG TABLET DISPERSIBLE Take 1 Tab by mouth every 8 hours as needed for Nausea/Vomiting. 10 Tab 0     No current facility-administered medications for this visit.      He  has a past medical history of Aortic stenosis; Breath shortness; Bronchitis; CHF (congestive heart failure) (CMS-HCC) (6/11/2010); Cirrhosis (CMS-HCC) (2014); COPD; COPD  "(chronic obstructive pulmonary disease) (CMS-HCC); Heart burn; Hepatitis C (1/16/2014); Hypertension; Indigestion; MRSA (methicillin resistant Staphylococcus aureus); Other emphysema (CMS-HCC); Other specified disorder of intestines; Psychiatric problem; Severe aortic stenosis (12/17/2013); Tobacco abuse (6/11/2010); and Wound. He also has no past medical history of Angina; Arrhythmia; Arthritis; ASTHMA; Backpain; Cancer (CMS-HCC); CATARACT; Diabetes; Dialysis; Glaucoma; Heart murmur; Jaundice; Myocardial infarct; Other specified symptom associated with female genital organs; Pacemaker; Personal history of venous thrombosis and embolism; Pneumonia; Renal disorder; Rheumatic fever; Seizure (CMS-HCC); Stroke (CMS-HCC); Unspecified disorder of thyroid; Unspecified hemorrhagic conditions; or Unspecified urinary incontinence.    ROS   No chest pain, no abdominal pain       Objective:     Blood pressure 130/72, pulse 87, temperature 36.6 °C (97.9 °F), resp. rate 16, height 1.778 m (5' 10\"), weight 79.4 kg (175 lb), SpO2 90 %. Body mass index is 25.11 kg/m².   Physical Exam:  Constitutional: Alert, no distress.Patient is using a cart to help him ambulate and has nasal cannula oxygen in place.  Skin: Warm, dry, good turgor, no rashes in visible areas.  Eye: Equal, round and reactive, conjunctiva clear, lids normal.  Respiratory: Scattered wheeze with decreased respiratory air movement.  Psych: Alert and oriented x3, normal affect and mood.        Assessment and Plan:   The following treatment plan was discussed    1. Chronic midline low back pain without sciatica  Controlled. Continue percocet 10 mg 4 times daily for 3 months. UDS and controlled substance agreement done today. Follow up in 3 months for additional refills.  - oxycodone-acetaminophen (PERCOCET-10)  MG Tab; Take 1 Tab by mouth every 6 hours as needed for Severe Pain. May refill 12/18/17  Dispense: 120 Tab; Refill: 0  - CONTROLLED SUBSTANCE TREATMENT " AGREEMENT  - [facility] PAIN MANAGEMENT SCREEN; Future    2. Tobacco abuse  Advised patient to quit.    3. COPD with exacerbation (CMS-HCC)  Refilled inhalers. Prescription for Z-Dino and prednisone.  - azithromycin (ZITHROMAX) 250 MG Tab; Take 2 tablets on day 1, then take 1 tablet a day for 4 days.  Dispense: 6 Tab; Refill: 0  - predniSONE (DELTASONE) 20 MG Tab; Take 2 Tabs by mouth every day for 5 days.  Dispense: 10 Tab; Refill: 0  - budesonide-formoterol (SYMBICORT) 160-4.5 MCG/ACT Aerosol; Inhale 2 Puffs by mouth 2 Times a Day. Use spacer. Rinse mouth after each use.  Dispense: 1 Inhaler; Refill: 11    4. Controlled type 2 diabetes mellitus without complication, without long-term current use of insulin (CMS-HCC)  Advise dietary modification. Continue metformin. Consider repeating labs in 6 months.    5. Pure hypercholesterolemia  Doing well. Continue current medication.      Followup: Return in about 3 months (around 1/16/2018) for Pain medication refill, Short.

## 2017-10-16 NOTE — ASSESSMENT & PLAN NOTE
A1c is slightly more elevated this time compared to last time. A1c is 6.8, and sat of 6.2. He is tolerating metformin 1000 mg twice daily.

## 2017-11-03 ENCOUNTER — TELEPHONE (OUTPATIENT)
Dept: MEDICAL GROUP | Facility: MEDICAL CENTER | Age: 67
End: 2017-11-03

## 2017-11-03 DIAGNOSIS — E11.9 CONTROLLED TYPE 2 DIABETES MELLITUS WITHOUT COMPLICATION, WITHOUT LONG-TERM CURRENT USE OF INSULIN (HCC): ICD-10-CM

## 2017-11-03 DIAGNOSIS — I10 ESSENTIAL HYPERTENSION: Chronic | ICD-10-CM

## 2017-11-16 ENCOUNTER — TELEPHONE (OUTPATIENT)
Dept: MEDICAL GROUP | Facility: MEDICAL CENTER | Age: 67
End: 2017-11-16

## 2017-11-16 NOTE — TELEPHONE ENCOUNTER
ANNUAL WELLNESS VISIT PRE-VISIT PLANNING     1.  Reviewed note from last office visit with PCP: YES    2.  If any orders were placed at last visit, do we have Results/Consult Notes?        •  Labs - Labs ordered, but not to be completed until 3/2018.   Note: If patient appointment is for lab review and patient did not complete labs, check with provider if OK to reschedule patient until labs completed.       •  Imaging - Imaging was not ordered at last office visit.       •  Referrals - No referrals were ordered at last office visit.    3.  Immunizations were updated in [facility] using WebIZ?: Yes       •  WebIZ Recommendations: TDAP and ZOSTAVAX (Shingles)       •  Is patient due for Tdap? NO       •  Is patient due for Shingles? NO     4.  Patient is due for the following Health Maintenance Topics:   Health Maintenance Due   Topic Date Due   • Annual Wellness Visit  1950   • IMM DTaP/Tdap/Td Vaccine (1 - Tdap) 11/12/1968   • IMM ZOSTER VACCINE  11/12/2009   • DIABETES MONOFILAMENT / LE EXAM  03/18/2016           5.  Reviewed/Updated the following with patient:       •   Preferred Pharmacy? YES       •   Preferred Lab? YES       •   Preferred Communication? YES       •   Allergies? YES       •   Medications? YES. Was Abstract Encounter opened and chart updated? YES       •   Social History? YES. Was Abstract Encounter opened and chart updated? YES       •   Family History (document living status of immediate family members and if + hx of cancer, diabetes, hypertension, hyperlipidemia, heart attack, stroke) YES. Was Abstract Encounter opened and chart updated? YES    6.  Care Team Updated:       •   DME Company (gait device, O2, CPAP, etc.): YES       •   Other Specialists (eye doctor, derm, GYN, cardiology, endo, etc): YES    7.  Patient has the following Care Path diagnoses on Problem List:  NONE    8.  Specialty Comments was updated with diagnosis information provided by [facility]: YES    9.  Patient was advised: “This  is a free wellness visit. The provider will screen for medical conditions to help you stay healthy. If you have other concerns to address you may be asked to discuss these at a separate visit or there may be an additional fee.”     6.  Patient was informed to arrive 15 min prior to their scheduled appointment and bring in their medication bottles.

## 2017-12-22 ENCOUNTER — TELEPHONE (OUTPATIENT)
Dept: MEDICAL GROUP | Facility: MEDICAL CENTER | Age: 67
End: 2017-12-22

## 2017-12-26 NOTE — TELEPHONE ENCOUNTER
ANNUAL WELLNESS VISIT PRE-VISIT PLANNING     1.  Reviewed note from last office visit with PCP: YES    2.  If any orders were placed at last visit, do we have Results/Consult Notes?        •  Labs - Labs ordered, completed on 10/23/17 and results are in chart.   Note: If patient appointment is for lab review and patient did not complete labs, check with provider if OK to reschedule patient until labs completed.       •  Imaging - Imaging was not ordered at last office visit.       •  Referrals - No referrals were ordered at last office visit.    3.  Immunizations were updated in [employer] using WebIZ?: Yes       •  WebIZ Recommendations: TDAP and ZOSTAVAX (Shingles)       •  Is patient due for Tdap? NO       •  Is patient due for Shingles? NO     4.  Patient is due for the following Health Maintenance Topics:   Health Maintenance Due   Topic Date Due   • Annual Wellness Visit  1950   • IMM DTaP/Tdap/Td Vaccine (1 - Tdap) 11/12/1968   • IMM ZOSTER VACCINE  11/12/2009   • DIABETES MONOFILAMENT / LE EXAM  03/18/2016       - Patient has completed FLU, PNEUMOVAX (PPSV23) and PREVNAR (PCV13)  Immunization(s) per WebIZ. Chart has been updated.      5.  Reviewed/Updated the following with patient:       •   Preferred Pharmacy? YES       •   Preferred Lab? YES       •   Preferred Communication? YES       •   Allergies? YES       •   Medications? YES. Was Abstract Encounter opened and chart updated? YES       •   Social History? YES. Was Abstract Encounter opened and chart updated? YES       •   Family History (document living status of immediate family members and if + hx of cancer, diabetes, hypertension, hyperlipidemia, heart attack, stroke) YES. Was Abstract Encounter opened and chart updated? YES    6.  Care Team Updated:       •   DME Company (gait device, O2, CPAP, etc.): NO       •   Other Specialists (eye doctor, derm, GYN, cardiology, endo, etc): YES    7.  Patient has the following Care Path diagnoses on Problem  List:  DIABETES    Has patient ever had diabetes education? Yes, and is NOT interested in more at this time.    CHF    1.  Date of last echo: 1/25/17  2.  Has patient ever had education regarding CHF? Yes, and is NOT interested in more at this time.  3.  Is patient under the care of a cardiologist? Yes, CareTeam was updated.    COPD    1.  Is patient under the care of a pulmonologist? Yes, CareTeam was updated.  2.  Has patient ever completed a PFT or spirometry? Yes, on 3/29/17.  3.  Is patient on oxygen or CPAP? Yes, CareTeam was updated.  4.  Has patient ever had instruction on inhaler technique by health care professional? Yes  5.  Is patient interested in a referral to respiratory therapy for more information on COPD, inhaler technique, and/or information on establishing an action plan?  Yes, and is NOT interested in more at this time.            8.  Specialty Comments was updated with diagnosis information provided by [facility]: N\A    9.  Patient was advised: “This is a free wellness visit. The provider will screen for medical conditions to help you stay healthy. If you have other concerns to address you may be asked to discuss these at a separate visit or there may be an additional fee.”     6.  Patient was informed to arrive 15 min prior to their scheduled appointment and bring in their medication bottles.

## 2017-12-27 ENCOUNTER — OFFICE VISIT (OUTPATIENT)
Dept: MEDICAL GROUP | Facility: MEDICAL CENTER | Age: 67
End: 2017-12-27
Payer: MEDICARE

## 2017-12-27 VITALS
BODY MASS INDEX: 25.2 KG/M2 | OXYGEN SATURATION: 95 % | HEART RATE: 100 BPM | TEMPERATURE: 97.5 F | HEIGHT: 70 IN | SYSTOLIC BLOOD PRESSURE: 140 MMHG | DIASTOLIC BLOOD PRESSURE: 70 MMHG | WEIGHT: 176 LBS

## 2017-12-27 DIAGNOSIS — K74.69 OTHER CIRRHOSIS OF LIVER (HCC): ICD-10-CM

## 2017-12-27 DIAGNOSIS — G89.29 CHRONIC MIDLINE LOW BACK PAIN WITHOUT SCIATICA: ICD-10-CM

## 2017-12-27 DIAGNOSIS — Z72.0 TOBACCO ABUSE: Chronic | ICD-10-CM

## 2017-12-27 DIAGNOSIS — E78.00 PURE HYPERCHOLESTEROLEMIA: ICD-10-CM

## 2017-12-27 DIAGNOSIS — M54.50 CHRONIC MIDLINE LOW BACK PAIN WITHOUT SCIATICA: ICD-10-CM

## 2017-12-27 DIAGNOSIS — F19.11 HX OF DRUG ABUSE (HCC): ICD-10-CM

## 2017-12-27 DIAGNOSIS — J96.11 CHRONIC RESPIRATORY FAILURE WITH HYPOXIA (HCC): ICD-10-CM

## 2017-12-27 DIAGNOSIS — F51.01 PRIMARY INSOMNIA: ICD-10-CM

## 2017-12-27 DIAGNOSIS — Z79.891 CHRONIC USE OF OPIATE DRUGS THERAPEUTIC PURPOSES: ICD-10-CM

## 2017-12-27 DIAGNOSIS — E87.6 DRUG-INDUCED HYPOKALEMIA: ICD-10-CM

## 2017-12-27 DIAGNOSIS — M25.551 RIGHT HIP PAIN: ICD-10-CM

## 2017-12-27 DIAGNOSIS — F11.20 UNCOMPLICATED OPIOID DEPENDENCE (HCC): ICD-10-CM

## 2017-12-27 DIAGNOSIS — R39.12 BENIGN PROSTATIC HYPERPLASIA WITH WEAK URINARY STREAM: ICD-10-CM

## 2017-12-27 DIAGNOSIS — J44.9 CHRONIC OBSTRUCTIVE PULMONARY DISEASE, UNSPECIFIED COPD TYPE (HCC): Chronic | ICD-10-CM

## 2017-12-27 DIAGNOSIS — Z00.00 MEDICARE ANNUAL WELLNESS VISIT, INITIAL: ICD-10-CM

## 2017-12-27 DIAGNOSIS — Z86.19 HX OF HEPATITIS C: ICD-10-CM

## 2017-12-27 DIAGNOSIS — E11.43 CONTROLLED TYPE 2 DIABETES MELLITUS WITH DIABETIC AUTONOMIC NEUROPATHY, WITHOUT LONG-TERM CURRENT USE OF INSULIN (HCC): ICD-10-CM

## 2017-12-27 DIAGNOSIS — F33.0 MDD (MAJOR DEPRESSIVE DISORDER), RECURRENT EPISODE, MILD (HCC): ICD-10-CM

## 2017-12-27 DIAGNOSIS — T50.905A DRUG-INDUCED HYPOKALEMIA: ICD-10-CM

## 2017-12-27 DIAGNOSIS — N40.1 BENIGN PROSTATIC HYPERPLASIA WITH WEAK URINARY STREAM: ICD-10-CM

## 2017-12-27 DIAGNOSIS — K21.9 GASTROESOPHAGEAL REFLUX DISEASE, ESOPHAGITIS PRESENCE NOT SPECIFIED: ICD-10-CM

## 2017-12-27 DIAGNOSIS — I10 ESSENTIAL HYPERTENSION: Chronic | ICD-10-CM

## 2017-12-27 PROCEDURE — G0438 PPPS, INITIAL VISIT: HCPCS | Performed by: FAMILY MEDICINE

## 2017-12-27 RX ORDER — TAMSULOSIN HYDROCHLORIDE 0.4 MG/1
0.8 CAPSULE ORAL
Qty: 180 CAP | Refills: 3 | Status: SHIPPED | OUTPATIENT
Start: 2017-12-27 | End: 2019-03-04 | Stop reason: SDUPTHER

## 2017-12-27 RX ORDER — BUPROPION HYDROCHLORIDE 150 MG/1
150 TABLET, EXTENDED RELEASE ORAL 2 TIMES DAILY
Qty: 60 TAB | Refills: 2 | Status: SHIPPED | OUTPATIENT
Start: 2017-12-27 | End: 2018-01-02 | Stop reason: SDUPTHER

## 2017-12-27 ASSESSMENT — PAIN SCALES - GENERAL: PAINLEVEL: 3=SLIGHT PAIN

## 2017-12-27 ASSESSMENT — PATIENT HEALTH QUESTIONNAIRE - PHQ9
SUM OF ALL RESPONSES TO PHQ QUESTIONS 1-9: 4
5. POOR APPETITE OR OVEREATING: 0 - NOT AT ALL
CLINICAL INTERPRETATION OF PHQ2 SCORE: 2

## 2017-12-27 NOTE — PROGRESS NOTES
Chief Complaint   Patient presents with   • Annual Wellness Visit         HPI:  Mumtaz is a 67 y.o. here for Medicare Annual Wellness Visit        Patient Active Problem List    Diagnosis Date Noted   • Uncomplicated opioid dependence (CMS-HCC) 12/27/2017   • GERD (gastroesophageal reflux disease) 07/06/2017   • Chronic use of opiate drugs therapeutic purposes 04/06/2017   • Chronic respiratory failure with hypoxia (CMS-HCC) 05/23/2016   • Drug-induced hypokalemia 02/28/2016   • Right hip pain 09/15/2015   • MDD (major depressive disorder), recurrent episode, mild (CMS-HCC) 07/19/2015   • Hyperlipidemia 03/18/2015   • Chronic lower back pain 08/28/2014   • Insomnia 08/21/2014   • Diabetes mellitus type 2, controlled (CMS-HCC) 07/29/2014   • Cirrhosis (CMS-HCC) 07/18/2014   • Hx of hepatitis C 01/16/2014   • Hx of drug abuse 12/17/2013   • BPH (benign prostatic hyperplasia) 12/17/2013   • COPD (chronic obstructive pulmonary disease) (CMS-HCC) 06/11/2010   • Tobacco abuse 06/11/2010   • HTN (hypertension) 06/11/2010       Current Outpatient Prescriptions   Medication Sig Dispense Refill   • tamsulosin (FLOMAX) 0.4 MG capsule Take 2 Caps by mouth every day. 180 Cap 3   • buPROPion SR (WELLBUTRIN-SR) 150 MG TABLET SR 12 HR sustained-release tablet Take 1 Tab by mouth 2 times a day. 60 Tab 2   • oxycodone-acetaminophen (PERCOCET-10)  MG Tab Take 1 Tab by mouth every 6 hours as needed for Severe Pain. May refill 12/18/17 120 Tab 0   • azithromycin (ZITHROMAX) 250 MG Tab Take 2 tablets on day 1, then take 1 tablet a day for 4 days. 6 Tab 0   • budesonide-formoterol (SYMBICORT) 160-4.5 MCG/ACT Aerosol Inhale 2 Puffs by mouth 2 Times a Day. Use spacer. Rinse mouth after each use. 1 Inhaler 11   • tiotropium (SPIRIVA HANDIHALER) 18 MCG Cap INHALE 1 CAPSULE BY MOUTH VIA HANDIHALER EVERY DAY 90 Cap 3   • albuterol 108 (90 Base) MCG/ACT Aero Soln inhalation aerosol Inhale 2 Puffs by mouth every 6 hours as needed. 8.5 g 6    • furosemide (LASIX) 20 MG Tab TAKE 1 TABLET BY MOUTH DAILY 90 Tab 3   • lisinopril (PRINIVIL) 10 MG Tab TAKE 1 TABLET BY MOUTH EVERY DAY 90 Tab 3   • omeprazole (PRILOSEC) 20 MG delayed-release capsule Take 1 Cap by mouth 2 Times a Day. 180 Cap 3   • amlodipine (NORVASC) 5 MG Tab TAKE 1 TABLET BY MOUTH EVERY DAY 90 Tab 3   • pravastatin (PRAVACHOL) 10 MG Tab TAKE 1 TABLET BY MOUTH EVERY DAY 90 Tab 3   • carvedilol (COREG) 3.125 MG Tab TAKE 1 TABLET BY MOUTH TWICE DAILY 180 Tab 3   • metformin (GLUCOPHAGE) 1000 MG tablet TAKE 1 TABLET BY MOUTH TWICE DAILY WITH MEALS 180 Tab 3   • potassium chloride SA (KDUR) 20 MEQ Tab CR TAKE 1 TABLET BY MOUTH EVERY DAY 90 Tab 3   • sertraline (ZOLOFT) 50 MG Tab TAKE 1 TABLET BY MOUTH EVERY DAY 90 Tab 3   • aspirin (ASA) 325 MG Tab Take 325 mg by mouth every day.     • ondansetron (ZOFRAN ODT) 4 MG TABLET DISPERSIBLE Take 1 Tab by mouth every 8 hours as needed for Nausea/Vomiting. 10 Tab 0     No current facility-administered medications for this visit.         Patient is taking medications as noted in medication list.  Current supplements as per medication list.     Allergies: Patient has no known allergies.    Current social contact/activities: pt watches tv and keeps home clean.      Is patient current with immunizations? No, due for TDAP and ZOSTAVAX (Shingles). Patient is interested in receiving NONE today.    He  reports that he quit smoking 7 days ago. His smoking use included Cigarettes. He smoked 2.00 packs per day. He has never used smokeless tobacco. He reports that he does not drink alcohol or use drugs.  Counseling given: Not Answered        DPA/Advanced directive: Pt is not interested at the time    ROS:    Gait: Uses a walker    Ostomy: no   Other tubes: no   Amputations: no   Chronic oxygen use yes   Last eye exam 6 months    Wears hearing aids: no   : Denies any urinary leakage during the last 6 months      Screening:    COPD    1.  Is patient under the care of  a pulmonologist? Yes, CareTeam was updated.  2.  Has patient ever completed a PFT or spirometry? Yes, on 3/17/17.  3.  Is patient on oxygen or CPAP? Yes, CareTeam was updated.    DIABETES    Has patient ever had diabetes education? No, patient is NOT interested.            Depression Screening    Little interest or pleasure in doing things?  0 - not at all  Feeling down, depressed, or hopeless? 2 - more than half the days  Trouble falling or staying asleep, or sleeping too much?  0 - not at all  Feeling tired or having little energy?  2 - more than half the days  Poor appetite or overeating?  0 - not at all  Feeling bad about yourself - or that you are a failure or have let yourself or your family down? 0 - not at all  Trouble concentrating on things, such as reading the newspaper or watching television? 0 - not at all  Moving or speaking so slowly that other people could have noticed.  Or the opposite - being so fidgety or restless that you have been moving around a lot more than usual?  0 - not at all  Thoughts that you would be better off dead, or of hurting yourself?  0 - not at all  Patient Health Questionnaire Score: 4      If depressive symptoms identified deferred to follow up visit unless specifically addressed in assessment and plan.    Interpretation of PHQ-9 Total Score   Score Severity   1-4 No Depression   5-9 Mild Depression   10-14 Moderate Depression   15-19 Moderately Severe Depression   20-27 Severe Depression      Screening for Cognitive Impairment    Three Minute Recall (apple, watch, eliecer)  3/3    Draw clock face with all 12 numbers set to the hand to show 10 minutes past 11 o'clock  0 2/5  If cognitive concerns identified, deferred for follow up unless specifically addressed in assessment and plan.    Fall Risk Assessment    Has the patient had two or more falls in the last year or any fall with injury in the last year?  No  If fall risk identified, deferred for follow up unless specifically  addressed in assessment and plan.      Safety Assessment    Throw rugs on floor.  Yes  Handrails on all stairs.  Yes  Good lighting in all hallways.  Yes  Difficulty hearing.  No  Patient counseled about all safety risks that were identified.    Functional Assessment ADLs    Are there any barriers preventing you from cooking for yourself or meeting nutritional needs?  No.    Are there any barriers preventing you from driving safely or obtaining transportation?  No.    Are there any barriers preventing you from using a telephone or calling for help?  No.    Are there any barriers preventing you from shopping?  No.    Are there any barriers preventing you from taking care of your own finances?  No.    Are there any barriers preventing you from managing your medications?  No.    Are you currently engaging any exercise or physical activity?  No.       Health Maintenance Summary                Annual Wellness Visit Overdue 1950     IMM DTaP/Tdap/Td Vaccine Overdue 11/12/1968     IMM ZOSTER VACCINE Overdue 11/12/2009     DIABETES MONOFILAMENT / LE EXAM Overdue 3/18/2016      Done 3/18/2015 AMB DIABETIC MONOFILAMENT LOWER EXTREMITY EXAM     Patient has more history with this topic...    A1C SCREENING Next Due 3/13/2018      Done 9/13/2017 HEMOGLOBIN A1C (A)     Patient has more history with this topic...    PFT SCREENING-FEV1 AND FEV/FVC RATIO / SPIROMETRY SHOULD BE PERFORMED ANNUALLY Next Due 3/29/2018      Done 3/29/2017 AMB PULMONARY FUNCTION TEST/LAB     Patient has more history with this topic...    RETINAL SCREENING Next Due 4/6/2018      Done 4/6/2017 POCT RETINAL EYE EXAM    FASTING LIPID PROFILE Next Due 9/13/2018      Done 9/13/2017 LIPID PROFILE (A)     Patient has more history with this topic...    URINE ACR / MICROALBUMIN Next Due 9/13/2018      Done 9/13/2017 MICROALBUMIN CREAT RATIO URINE     Patient has more history with this topic...    SERUM CREATININE Next Due 9/13/2018      Done 9/13/2017 COMP  "METABOLIC PANEL (A)     Patient has more history with this topic...    URINE DRUG SCREEN Next Due 10/15/2018      Done 10/20/2017 [facility] SCREEN     Patient has more history with this topic...    COLONOSCOPY Next Due 7/10/2019      Done 7/10/2014 AMB REFERRAL TO GI FOR COLONOSCOPY    IMM PNEUMOCOCCAL 65+ (ADULT) LOW/MEDIUM RISK SERIES Next Due 7/23/2019      Done 3/30/2017 Imm Admin: Pneumococcal Conjugate Vaccine (Prevnar/PCV-13)     Patient has more history with this topic...          Patient Care Team:  Marlo Reynolds M.D. as PCP - General (Family Medicine)  Ping Tilley M.D. as Consulting Physician (Gastroenterology)  Osei Garcia M.D. as Consulting Physician (Pulmonary Medicine)      Social History   Substance Use Topics   • Smoking status: Former Smoker     Packs/day: 2.00     Types: Cigarettes     Quit date: 12/20/2017   • Smokeless tobacco: Never Used      Comment: ppd  1 pack per day/ started smoking at age 17   • Alcohol use No      Comment: quit \" 8 years ago\"     Family History   Problem Relation Age of Onset   • Cancer Mother    • No Known Problems Father    • No Known Problems Maternal Grandmother    • No Known Problems Maternal Grandfather    • No Known Problems Paternal Grandmother    • No Known Problems Paternal Grandfather      He  has a past medical history of Aortic stenosis; Breath shortness; Bronchitis; CHF (congestive heart failure) (CMS-[facility]) (6/11/2010); Cirrhosis (CMS-HCC) (2014); COPD; COPD (chronic obstructive pulmonary disease) (CMS-HCC); HCV (hepatitis C virus) (1/16/2014); Heart burn; Hepatitis C (1/16/2014); Hypertension; Indigestion; MRSA (methicillin resistant Staphylococcus aureus); Other emphysema (CMS-HCC); Other specified disorder of intestines; Psychiatric problem; Severe aortic stenosis (12/17/2013); Tobacco abuse (6/11/2010); and Wound. He also has no past medical history of Angina; Arrhythmia; Arthritis; ASTHMA; Backpain; Cancer (CMS-HCC); CATARACT; " "Diabetes; Dialysis; Glaucoma; Heart murmur; Jaundice; Myocardial infarct; Other specified symptom associated with female genital organs; Pacemaker; Personal history of venous thrombosis and embolism; Pneumonia; Renal disorder; Rheumatic fever; Seizure (CMS-HCC); Stroke (CMS-HCC); Unspecified disorder of thyroid; Unspecified hemorrhagic conditions; or Unspecified urinary incontinence.   Past Surgical History:   Procedure Laterality Date   • RECOVERY  4/4/2014    Performed by [facility] at SURGERY SAME DAY [facility] ER ORS   • HERNIA REP INGUINAL     • OTHER      dental 2006   • OTHER ORTHOPEDIC SURGERY      right knee         Exam:     Blood pressure 140/70, pulse 100, temperature 36.4 °C (97.5 °F), height 1.77 m (5' 9.69\"), weight 79.8 kg (176 lb), SpO2 95 %. Body mass index is 25.48 kg/m².    Constitutional: Alert, no distress.  Skin: Warm, dry, good turgor, no rashes in visible areas.  Eye: Equal, round and reactive, conjunctiva clear, lids normal.  Psych: Alert and oriented x3, normal affect and mood.    Monofilament testing with a 10 gram force: sensation intact: decreased bilaterally  Visual Inspection: Feet without maceration, ulcers, fissures.  Pedal pulses: intact bilaterally      Assessment and Plan. The following treatment and monitoring plan is recommended:    1. Medicare annual wellness visit, initial  - Initial Wellness Visit - Includes PPPS ()    2. Controlled type 2 diabetes mellitus with diabetic autonomic neuropathy, without long-term current use of insulin (CMS-HCC)  Controlled A1c. Continue metformin.  - Initial Wellness Visit - Includes PPPS ()  - Diabetic Monofilament Lower Extremity Exam    3. Other cirrhosis of liver (CMS-HCC)  Advised patient follow up with GI for monitoring.  - Initial Wellness Visit - Includes PPPS ()    4. Hx of hepatitis C  Resolved after treatment in 2015.  - Initial Wellness Visit - Includes PPPS ()    5. Chronic obstructive pulmonary disease, " unspecified COPD type (CMS-HCC)  Stable. Continue supplement oxygen and current inhalers of Spiriva, Symbicort, albuterol.  - Initial Wellness Visit - Includes PPPS ()    6. Chronic respiratory failure with hypoxia (CMS-HCC)  Continue supplemental oxygen at 3.5-5 L daily.  - Initial Wellness Visit - Includes PPPS ()    7. MDD (major depressive disorder), recurrent episode, mild (CMS-HCC)  Stable. Continue with sertraline  - Initial Wellness Visit - Includes PPPS ()    8. Uncomplicated opioid dependence (CMS-HCC)  Patient has withdrawals if he does not have Percocet.  - Initial Wellness Visit - Includes PPPS ()    9. Chronic midline low back pain without sciatica  Stable. Continue Percocet. Follow-up in 1-2 weeks for refills.  - Initial Wellness Visit - Includes PPPS ()    10. Hx of drug abuse  - Initial Wellness Visit - Includes PPPS ()    11. Benign prostatic hyperplasia with weak urinary stream  Uncontrolled. Increase Flomax from 0.4 mg daily 2.8 mg daily.  - Initial Wellness Visit - Includes PPPS ()    12. Primary insomnia  Stable. Patient is sleeping well on medications.  - Initial Wellness Visit - Includes PPPS ()    13. Pure hypercholesterolemia  Controlled. Continue pravastatin.  - Initial Wellness Visit - Includes PPPS ()    14. Drug-induced hypokalemia  Stable. Continue potassium chloride.  - Initial Wellness Visit - Includes PPPS ()    15. Right hip pain  Stable. Continue Percocet.  - Initial Wellness Visit - Includes PPPS ()    16. Essential hypertension  Controlled. Continue current medications.  - Initial Wellness Visit - Includes PPPS ()    17. Chronic use of opiate drugs therapeutic purposes  - Initial Wellness Visit - Includes PPPS ()    18. Gastroesophageal reflux disease, esophagitis presence not specified  Controlled with omeprazole.  - Initial Wellness Visit - Includes PPPS ()    19. Tobacco abuse  Trial of bupropion to help  assist with smoking cessation.  - Initial Wellness Visit - Includes PPPS ()        Services suggested: no services needed right now  Health Care Screening recommendations as per orders if indicated.  Referrals offered: PT/OT/Nutrition counseling/Behavioral Health/Smoking cessation as per orders if indicated.    Discussion today about general wellness and lifestyle habits:    · Prevent falls and reduce trip hazards; Cautioned about securing or removing rugs.  · Have a working fire alarm and carbon monoxide detector;   · Engage in regular physical activity and social activities       Follow-up: Return in about 2 weeks (around 1/10/2018) for Pain medication refill.

## 2018-01-02 ENCOUNTER — OFFICE VISIT (OUTPATIENT)
Dept: MEDICAL GROUP | Facility: MEDICAL CENTER | Age: 68
End: 2018-01-02
Payer: MEDICARE

## 2018-01-02 VITALS
WEIGHT: 177 LBS | HEART RATE: 103 BPM | SYSTOLIC BLOOD PRESSURE: 128 MMHG | OXYGEN SATURATION: 95 % | DIASTOLIC BLOOD PRESSURE: 68 MMHG | BODY MASS INDEX: 25.34 KG/M2 | TEMPERATURE: 96.8 F | HEIGHT: 70 IN

## 2018-01-02 DIAGNOSIS — M54.50 CHRONIC MIDLINE LOW BACK PAIN WITHOUT SCIATICA: ICD-10-CM

## 2018-01-02 DIAGNOSIS — G89.29 CHRONIC MIDLINE LOW BACK PAIN WITHOUT SCIATICA: ICD-10-CM

## 2018-01-02 DIAGNOSIS — E11.43 CONTROLLED TYPE 2 DIABETES MELLITUS WITH DIABETIC AUTONOMIC NEUROPATHY, WITHOUT LONG-TERM CURRENT USE OF INSULIN (HCC): ICD-10-CM

## 2018-01-02 DIAGNOSIS — Z72.0 TOBACCO ABUSE: Chronic | ICD-10-CM

## 2018-01-02 PROCEDURE — 99214 OFFICE O/P EST MOD 30 MIN: CPT | Performed by: FAMILY MEDICINE

## 2018-01-02 RX ORDER — BUPROPION HYDROCHLORIDE 150 MG/1
150 TABLET, EXTENDED RELEASE ORAL 2 TIMES DAILY
Qty: 180 TAB | Refills: 1 | Status: SHIPPED | OUTPATIENT
Start: 2018-01-02 | End: 2018-01-03

## 2018-01-02 RX ORDER — OXYCODONE AND ACETAMINOPHEN 10; 325 MG/1; MG/1
1 TABLET ORAL EVERY 6 HOURS PRN
Qty: 120 TAB | Refills: 0 | Status: SHIPPED | OUTPATIENT
Start: 2018-01-15 | End: 2018-01-02 | Stop reason: SDUPTHER

## 2018-01-02 RX ORDER — OXYCODONE AND ACETAMINOPHEN 10; 325 MG/1; MG/1
1 TABLET ORAL EVERY 6 HOURS PRN
Qty: 120 TAB | Refills: 0 | Status: SHIPPED | OUTPATIENT
Start: 2018-02-12 | End: 2018-01-02 | Stop reason: SDUPTHER

## 2018-01-02 RX ORDER — OXYCODONE AND ACETAMINOPHEN 10; 325 MG/1; MG/1
1 TABLET ORAL EVERY 6 HOURS PRN
Qty: 120 TAB | Refills: 0 | Status: SHIPPED | OUTPATIENT
Start: 2018-03-12 | End: 2018-03-30 | Stop reason: SDUPTHER

## 2018-01-02 NOTE — ASSESSMENT & PLAN NOTE
Patient has started Wellbutrin and is tolerating it without any side effects. He is requesting a 90 day supply.

## 2018-01-02 NOTE — ASSESSMENT & PLAN NOTE
Chronic back pain. Patient has known wedge fracture in Thoracic spine, recently seen on x-ray.  Patient states that Percocet 10 mg tablets, 4 times daily, are not effective at controlling his pain.  Current pain control: good, 4/10  Adverse effects: none.  Physical functioning: good  Mood: fair  Family and social relationships: fair  Sleep pattern: good  Overall functioning: fair  Nonnarcotic treatments that are being used: nothing right now, has tried topical OTC pain relievers    Pain management agreement initiated and signed on: October 2017  Last dose of narcotic medication: this morning  Most recent urine drug screen done October 2017, which was reviewed today and consistent. No longer on Xanax, weaned off.  Aberrant Behaviors: None  I have reviewed the medical records, the Prescription Monitoring Program and I have determined that percocet 10, 4 times daily, is medically indicated.    I have advised patient to keep medication in a safe place and to not drive with medication.

## 2018-01-02 NOTE — PROGRESS NOTES
Subjective:   Mumtaz Carney is a 67 y.o. male here today for Lower back pain    Chronic lower back pain  Chronic back pain. Patient has known wedge fracture in Thoracic spine, recently seen on x-ray.  Patient states that Percocet 10 mg tablets, 4 times daily, are not effective at controlling his pain.  Current pain control: good, 4/10  Adverse effects: none.  Physical functioning: good  Mood: fair  Family and social relationships: fair  Sleep pattern: good  Overall functioning: fair  Nonnarcotic treatments that are being used: nothing right now, has tried topical OTC pain relievers    Pain management agreement initiated and signed on: October 2017  Last dose of narcotic medication: this morning  Most recent urine drug screen done October 2017, which was reviewed today and consistent. No longer on Xanax, weaned off.  Aberrant Behaviors: None  I have reviewed the medical records, the Prescription Monitoring Program and I have determined that percocet 10, 4 times daily, is medically indicated.    I have advised patient to keep medication in a safe place and to not drive with medication.    Diabetes mellitus type 2, controlled  Patient did not have lab work done. He is still taking metformin 1000 mg twice a day.    Tobacco abuse  Patient has started Wellbutrin and is tolerating it without any side effects. He is requesting a 90 day supply.         Current medicines (including changes today)  Current Outpatient Prescriptions   Medication Sig Dispense Refill   • buPROPion SR (WELLBUTRIN-SR) 150 MG TABLET SR 12 HR sustained-release tablet Take 1 Tab by mouth 2 times a day. 180 Tab 1   • [START ON 3/12/2018] oxycodone-acetaminophen (PERCOCET-10)  MG Tab Take 1 Tab by mouth every 6 hours as needed for Severe Pain for up to 30 days. 120 Tab 0   • tamsulosin (FLOMAX) 0.4 MG capsule Take 2 Caps by mouth every day. 180 Cap 3   • azithromycin (ZITHROMAX) 250 MG Tab Take 2 tablets on day 1, then take 1 tablet a day  for 4 days. 6 Tab 0   • budesonide-formoterol (SYMBICORT) 160-4.5 MCG/ACT Aerosol Inhale 2 Puffs by mouth 2 Times a Day. Use spacer. Rinse mouth after each use. 1 Inhaler 11   • tiotropium (SPIRIVA HANDIHALER) 18 MCG Cap INHALE 1 CAPSULE BY MOUTH VIA HANDIHALER EVERY DAY 90 Cap 3   • albuterol 108 (90 Base) MCG/ACT Aero Soln inhalation aerosol Inhale 2 Puffs by mouth every 6 hours as needed. 8.5 g 6   • furosemide (LASIX) 20 MG Tab TAKE 1 TABLET BY MOUTH DAILY 90 Tab 3   • lisinopril (PRINIVIL) 10 MG Tab TAKE 1 TABLET BY MOUTH EVERY DAY 90 Tab 3   • omeprazole (PRILOSEC) 20 MG delayed-release capsule Take 1 Cap by mouth 2 Times a Day. 180 Cap 3   • amlodipine (NORVASC) 5 MG Tab TAKE 1 TABLET BY MOUTH EVERY DAY 90 Tab 3   • pravastatin (PRAVACHOL) 10 MG Tab TAKE 1 TABLET BY MOUTH EVERY DAY 90 Tab 3   • carvedilol (COREG) 3.125 MG Tab TAKE 1 TABLET BY MOUTH TWICE DAILY 180 Tab 3   • metformin (GLUCOPHAGE) 1000 MG tablet TAKE 1 TABLET BY MOUTH TWICE DAILY WITH MEALS 180 Tab 3   • potassium chloride SA (KDUR) 20 MEQ Tab CR TAKE 1 TABLET BY MOUTH EVERY DAY 90 Tab 3   • sertraline (ZOLOFT) 50 MG Tab TAKE 1 TABLET BY MOUTH EVERY DAY 90 Tab 3   • aspirin (ASA) 325 MG Tab Take 325 mg by mouth every day.     • ondansetron (ZOFRAN ODT) 4 MG TABLET DISPERSIBLE Take 1 Tab by mouth every 8 hours as needed for Nausea/Vomiting. 10 Tab 0     No current facility-administered medications for this visit.      He  has a past medical history of Aortic stenosis; Breath shortness; Bronchitis; CHF (congestive heart failure) (CMS-HCC) (6/11/2010); Cirrhosis (CMS-HCC) (2014); COPD; COPD (chronic obstructive pulmonary disease) (CMS-[facility]); HCV (hepatitis C virus) (1/16/2014); Heart burn; Hepatitis C (1/16/2014); Hypertension; Indigestion; MRSA (methicillin resistant Staphylococcus aureus); Other emphysema (CMS-[facility]); Other specified disorder of intestines; Psychiatric problem; Severe aortic stenosis (12/17/2013); Tobacco abuse (6/11/2010); and  "Wound. He also has no past medical history of Angina; Arrhythmia; Arthritis; ASTHMA; Backpain; Cancer (CMS-HCC); CATARACT; Diabetes; Dialysis; Glaucoma; Heart murmur; Jaundice; Myocardial infarct; Other specified symptom associated with female genital organs; Pacemaker; Personal history of venous thrombosis and embolism; Pneumonia; Renal disorder; Rheumatic fever; Seizure (CMS-HCC); Stroke (CMS-HCC); Unspecified disorder of thyroid; Unspecified hemorrhagic conditions; or Unspecified urinary incontinence.    ROS   No chest pain, + lower back pain       Objective:     Blood pressure 128/68, pulse (!) 103, temperature 36 °C (96.8 °F), height 1.77 m (5' 9.69\"), weight 80.3 kg (177 lb), SpO2 95 %. Body mass index is 25.62 kg/m².   Physical Exam:  Constitutional: Alert, no distress.  Skin: Warm, dry, good turgor, no rashes in visible areas.  Eye: Equal, round and reactive, conjunctiva clear, lids normal.  Psych: Alert and oriented x3, normal affect and mood.        Assessment and Plan:   The following treatment plan was discussed    1. Chronic midline low back pain without sciatica  Patient has an opioid risk score of 14. We will refill his narcotic pain medication for 3 months but we are also placing a referral to a pain specialist for him to receive his narcotics through a pain specialist for more specialized observation and treatment.  - CONSENT FOR OPIATE PRESCRIPTION  - oxycodone-acetaminophen (PERCOCET-10)  MG Tab; Take 1 Tab by mouth every 6 hours as needed for Severe Pain for up to 30 days.  Dispense: 120 Tab; Refill: 0  - REFERRAL TO PAIN CLINIC    2. Controlled type 2 diabetes mellitus with diabetic autonomic neuropathy, without long-term current use of insulin (CMS-HCC)  Controlled. Follow-up in 3 months with labs. Continue metformin.  - COMP METABOLIC PANEL; Future  - HEMOGLOBIN A1C; Future  - MICROALBUMIN CREAT RATIO URINE; Future  - LIPID PROFILE; Future    3. Tobacco abuse  Tolerating Wellbutrin. " Refills sent to pharmacy for 3 month supply.  - buPROPion SR (WELLBUTRIN-SR) 150 MG TABLET SR 12 HR sustained-release tablet; Take 1 Tab by mouth 2 times a day.  Dispense: 180 Tab; Refill: 1      Followup: Return in about 3 months (around 4/2/2018) for Diabetes.

## 2018-01-03 ENCOUNTER — TELEPHONE (OUTPATIENT)
Dept: MEDICAL GROUP | Facility: MEDICAL CENTER | Age: 68
End: 2018-01-03

## 2018-01-03 NOTE — TELEPHONE ENCOUNTER
1. Caller Name: Mumtaz Carney                                         Call Back Number: [phone]      Patient approves a detailed voicemail message: yes    Pt called to report he has stopped taking his buPROPion. Pt reports he has been smoking twice as much since starting this medication and is also experiencing dry mouth.

## 2018-01-16 ENCOUNTER — TELEPHONE (OUTPATIENT)
Dept: MEDICAL GROUP | Facility: MEDICAL CENTER | Age: 68
End: 2018-01-16

## 2018-01-16 DIAGNOSIS — Z79.891 CHRONIC USE OF OPIATE DRUGS THERAPEUTIC PURPOSES: ICD-10-CM

## 2018-01-16 DIAGNOSIS — G89.29 CHRONIC MIDLINE LOW BACK PAIN WITHOUT SCIATICA: ICD-10-CM

## 2018-01-16 DIAGNOSIS — M54.50 CHRONIC MIDLINE LOW BACK PAIN WITHOUT SCIATICA: ICD-10-CM

## 2018-01-16 DIAGNOSIS — F11.20 UNCOMPLICATED OPIOID DEPENDENCE (HCC): ICD-10-CM
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
## 2018-03-19 ENCOUNTER — TELEPHONE (OUTPATIENT)
Dept: MEDICAL GROUP | Facility: MEDICAL CENTER | Age: 68
End: 2018-03-19

## 2018-03-30 ENCOUNTER — OFFICE VISIT (OUTPATIENT)
Dept: MEDICAL GROUP | Facility: MEDICAL CENTER | Age: 68
End: 2018-03-30
Payer: MEDICARE

## 2018-03-30 VITALS
OXYGEN SATURATION: 86 % | SYSTOLIC BLOOD PRESSURE: 190 MMHG | HEIGHT: 70 IN | TEMPERATURE: 96.6 F | RESPIRATION RATE: 18 BRPM | WEIGHT: 178.2 LBS | HEART RATE: 106 BPM | BODY MASS INDEX: 25.51 KG/M2 | DIASTOLIC BLOOD PRESSURE: 82 MMHG

## 2018-03-30 DIAGNOSIS — E11.43 CONTROLLED TYPE 2 DIABETES MELLITUS WITH DIABETIC AUTONOMIC NEUROPATHY, WITHOUT LONG-TERM CURRENT USE OF INSULIN (HCC): ICD-10-CM

## 2018-03-30 DIAGNOSIS — J44.1 COPD WITH EXACERBATION (HCC): ICD-10-CM

## 2018-03-30 DIAGNOSIS — M54.50 CHRONIC MIDLINE LOW BACK PAIN WITHOUT SCIATICA: ICD-10-CM

## 2018-03-30 DIAGNOSIS — G89.29 CHRONIC MIDLINE LOW BACK PAIN WITHOUT SCIATICA: ICD-10-CM

## 2018-03-30 PROCEDURE — 99214 OFFICE O/P EST MOD 30 MIN: CPT | Performed by: FAMILY MEDICINE

## 2018-03-30 RX ORDER — OXYCODONE AND ACETAMINOPHEN 10; 325 MG/1; MG/1
1 TABLET ORAL EVERY 6 HOURS PRN
Qty: 120 TAB | Refills: 0 | Status: SHIPPED | OUTPATIENT
Start: 2018-05-25 | End: 2018-06-29 | Stop reason: SDUPTHER

## 2018-03-30 RX ORDER — OXYCODONE AND ACETAMINOPHEN 10; 325 MG/1; MG/1
1 TABLET ORAL EVERY 6 HOURS PRN
Qty: 120 TAB | Refills: 0 | Status: SHIPPED | OUTPATIENT
Start: 2018-04-27 | End: 2018-03-30 | Stop reason: SDUPTHER

## 2018-03-30 RX ORDER — PREDNISONE 20 MG/1
40 TABLET ORAL DAILY
Qty: 10 TAB | Refills: 0 | Status: SHIPPED | OUTPATIENT
Start: 2018-03-30 | End: 2018-04-04

## 2018-03-30 RX ORDER — OXYCODONE AND ACETAMINOPHEN 10; 325 MG/1; MG/1
1 TABLET ORAL EVERY 6 HOURS PRN
Qty: 120 TAB | Refills: 0 | Status: SHIPPED | OUTPATIENT
Start: 2018-03-30 | End: 2018-03-30 | Stop reason: SDUPTHER

## 2018-03-30 RX ORDER — AZITHROMYCIN 250 MG/1
TABLET, FILM COATED ORAL
Qty: 6 TAB | Refills: 0 | Status: SHIPPED | OUTPATIENT
Start: 2018-03-30 | End: 2018-11-30 | Stop reason: SDUPTHER

## 2018-03-30 NOTE — ASSESSMENT & PLAN NOTE
Patient has been taking metformin 1000 mg twice a day. He did not have his lab work done before her office visit today.

## 2018-03-30 NOTE — PROGRESS NOTES
Subjective:   Mumtaz Carney is a 67 y.o. male here today for diabetes, back pain, COPD    Diabetes mellitus type 2, controlled  Patient has been taking metformin 1000 mg twice a day. He did not have his lab work done before her office visit today.    Chronic lower back pain  Chronic back pain. Patient has known wedge fracture in Thoracic spine, recently seen on x-ray.  Patient states that Percocet 10 mg tablets, 4 times daily, are not effective at controlling his pain.  Current pain control: good, 4/10  Adverse effects: none.  Physical functioning: good  Mood: fair  Family and social relationships: fair  Sleep pattern: good  Overall functioning: fair  Nonnarcotic treatments that are being used: nothing right now, has tried topical OTC pain relievers    Pain management agreement initiated and signed on: January 2018  Last dose of narcotic medication: this morning  Most recent urine drug screen done October 2017, which was reviewed today and consistent. No longer on Xanax, weaned off.  Aberrant Behaviors: None  I have reviewed the medical records, the Prescription Monitoring Program and I have determined that percocet 10, 4 times daily, is medically indicated.    I have advised patient to keep medication in a safe place and to not drive with medication.         Patient is having increased shortness of breath for the last one day. He does not feel like he needs good emergency department but is requesting prednisone and azithromycin refills. He states he is having increased wheezing. He has tried to quit cigarettes, but smoked a cigarette before he came into our office today.      Current medicines (including changes today)  Current Outpatient Prescriptions   Medication Sig Dispense Refill   • predniSONE (DELTASONE) 20 MG Tab Take 2 Tabs by mouth every day for 5 days. 10 Tab 0   • azithromycin (ZITHROMAX) 250 MG Tab Take 2 tablets on day 1, then take 1 tablet a day for 4 days. 6 Tab 0   • [START ON 5/25/2018]  oxyCODONE-acetaminophen (PERCOCET-10)  MG Tab Take 1 Tab by mouth every 6 hours as needed for Severe Pain for up to 30 days. 120 Tab 0   • tamsulosin (FLOMAX) 0.4 MG capsule Take 2 Caps by mouth every day. 180 Cap 3   • budesonide-formoterol (SYMBICORT) 160-4.5 MCG/ACT Aerosol Inhale 2 Puffs by mouth 2 Times a Day. Use spacer. Rinse mouth after each use. 1 Inhaler 11   • tiotropium (SPIRIVA HANDIHALER) 18 MCG Cap INHALE 1 CAPSULE BY MOUTH VIA HANDIHALER EVERY DAY 90 Cap 3   • albuterol 108 (90 Base) MCG/ACT Aero Soln inhalation aerosol Inhale 2 Puffs by mouth every 6 hours as needed. 8.5 g 6   • furosemide (LASIX) 20 MG Tab TAKE 1 TABLET BY MOUTH DAILY 90 Tab 3   • lisinopril (PRINIVIL) 10 MG Tab TAKE 1 TABLET BY MOUTH EVERY DAY 90 Tab 3   • omeprazole (PRILOSEC) 20 MG delayed-release capsule Take 1 Cap by mouth 2 Times a Day. 180 Cap 3   • amlodipine (NORVASC) 5 MG Tab TAKE 1 TABLET BY MOUTH EVERY DAY 90 Tab 3   • pravastatin (PRAVACHOL) 10 MG Tab TAKE 1 TABLET BY MOUTH EVERY DAY 90 Tab 3   • carvedilol (COREG) 3.125 MG Tab TAKE 1 TABLET BY MOUTH TWICE DAILY 180 Tab 3   • metformin (GLUCOPHAGE) 1000 MG tablet TAKE 1 TABLET BY MOUTH TWICE DAILY WITH MEALS 180 Tab 3   • potassium chloride SA (KDUR) 20 MEQ Tab CR TAKE 1 TABLET BY MOUTH EVERY DAY 90 Tab 3   • sertraline (ZOLOFT) 50 MG Tab TAKE 1 TABLET BY MOUTH EVERY DAY 90 Tab 3   • aspirin (ASA) 325 MG Tab Take 325 mg by mouth every day.     • ondansetron (ZOFRAN ODT) 4 MG TABLET DISPERSIBLE Take 1 Tab by mouth every 8 hours as needed for Nausea/Vomiting. 10 Tab 0     No current facility-administered medications for this visit.      He  has a past medical history of Aortic stenosis; Breath shortness; Bronchitis; CHF (congestive heart failure) (CMS-HCC) (6/11/2010); Cirrhosis (CMS-HCC) (2014); COPD; COPD (chronic obstructive pulmonary disease) (CMS-HCC); HCV (hepatitis C virus) (1/16/2014); Heart burn; Hepatitis C (1/16/2014); Hypertension; Indigestion; MRSA  "(methicillin resistant Staphylococcus aureus); Other emphysema (CMS-HCC); Other specified disorder of intestines; Psychiatric problem; Severe aortic stenosis (12/17/2013); Tobacco abuse (6/11/2010); and Wound. He also has no past medical history of Angina; Arrhythmia; Arthritis; ASTHMA; Backpain; Cancer (CMS-HCC); CATARACT; Diabetes; Dialysis; Glaucoma; Heart murmur; Jaundice; Myocardial infarct; Other specified symptom associated with female genital organs; Pacemaker; Personal history of venous thrombosis and embolism; Pneumonia; Renal disorder; Rheumatic fever; Seizure (CMS-HCC); Stroke (CMS-HCC); Unspecified disorder of thyroid; Unspecified hemorrhagic conditions; or Unspecified urinary incontinence.    ROS   Positive shortness of breath, positive fatigue       Objective:     Blood pressure (!) 190/82, pulse (!) 106, temperature 35.9 °C (96.6 °F), resp. rate 18, height 1.77 m (5' 9.69\"), weight 80.8 kg (178 lb 3.2 oz), SpO2 (!) 86 %. Body mass index is 25.8 kg/m².   Physical Exam:  Constitutional: Alert, no distress.  Skin: Warm, dry, good turgor, no rashes in visible areas.  Eye: Equal, round and reactive, conjunctiva clear, lids normal.  Psych: Alert and oriented x3, normal affect and mood.        Assessment and Plan:   The following treatment plan was discussed    1. Chronic midline low back pain without sciatica  Controlled. Refill Percocet. Follow-up in 3 months for additional refills.  - oxyCODONE-acetaminophen (PERCOCET-10)  MG Tab; Take 1 Tab by mouth every 6 hours as needed for Severe Pain for up to 30 days.  Dispense: 120 Tab; Refill: 0    2. COPD with exacerbation (CMS-HCC)  Prescription for azithromycin and prednisone. ER precautions given.  - azithromycin (ZITHROMAX) 250 MG Tab; Take 2 tablets on day 1, then take 1 tablet a day for 4 days.  Dispense: 6 Tab; Refill: 0      3. Controlled type 2 diabetes mellitus with diabetic autonomic neuropathy, without long-term current use of insulin " (CMS-[facility])  Check labs before next appointment and follow-up.  - COMP METABOLIC PANEL; Future  - HEMOGLOBIN A1C; Future  - MICROALBUMIN CREAT RATIO URINE; Future  - LIPID PROFILE; Future      Followup: Return in about 3 months (around 6/30/2018) for Pain medication refill, Diabetes.

## 2018-04-24 ENCOUNTER — APPOINTMENT (OUTPATIENT)
Dept: RADIOLOGY | Facility: MEDICAL CENTER | Age: 68
End: 2018-04-24
Attending: INTERNAL MEDICINE
Payer: MEDICARE

## 2018-05-03 ENCOUNTER — HOSPITAL ENCOUNTER (OUTPATIENT)
Dept: LAB | Facility: MEDICAL CENTER | Age: 68
End: 2018-05-03
Attending: INTERNAL MEDICINE
Payer: MEDICARE

## 2018-05-03 ENCOUNTER — HOSPITAL ENCOUNTER (OUTPATIENT)
Dept: RADIOLOGY | Facility: MEDICAL CENTER | Age: 68
End: 2018-05-03
Attending: INTERNAL MEDICINE
Payer: MEDICARE

## 2018-05-03 ENCOUNTER — HOSPITAL ENCOUNTER (OUTPATIENT)
Dept: LAB | Facility: MEDICAL CENTER | Age: 68
End: 2018-05-03
Attending: FAMILY MEDICINE
Payer: MEDICARE

## 2018-05-03 DIAGNOSIS — K74.60 CIRRHOSIS OF LIVER WITHOUT ASCITES, UNSPECIFIED HEPATIC CIRRHOSIS TYPE (HCC): ICD-10-CM

## 2018-05-03 DIAGNOSIS — E11.43 CONTROLLED TYPE 2 DIABETES MELLITUS WITH DIABETIC AUTONOMIC NEUROPATHY, WITHOUT LONG-TERM CURRENT USE OF INSULIN (HCC): ICD-10-CM

## 2018-05-03 DIAGNOSIS — J44.89 OBSTRUCTIVE CHRONIC BRONCHITIS WITHOUT EXACERBATION: ICD-10-CM

## 2018-05-03 DIAGNOSIS — R63.5 ABNORMAL WEIGHT GAIN: ICD-10-CM

## 2018-05-03 LAB
ALBUMIN SERPL BCP-MCNC: 3.9 G/DL (ref 3.2–4.9)
ALBUMIN/GLOB SERPL: 1.3 G/DL
ALP SERPL-CCNC: 52 U/L (ref 30–99)
ALT SERPL-CCNC: 26 U/L (ref 2–50)
ANION GAP SERPL CALC-SCNC: 7 MMOL/L (ref 0–11.9)
AST SERPL-CCNC: 19 U/L (ref 12–45)
BASOPHILS # BLD AUTO: 0.3 % (ref 0–1.8)
BASOPHILS # BLD: 0.03 K/UL (ref 0–0.12)
BILIRUB SERPL-MCNC: 0.5 MG/DL (ref 0.1–1.5)
BUN SERPL-MCNC: 12 MG/DL (ref 8–22)
CALCIUM SERPL-MCNC: 8.9 MG/DL (ref 8.5–10.5)
CHLORIDE SERPL-SCNC: 97 MMOL/L (ref 96–112)
CHOLEST SERPL-MCNC: 144 MG/DL (ref 100–199)
CO2 SERPL-SCNC: 34 MMOL/L (ref 20–33)
COMMENT 1642: NORMAL
CREAT SERPL-MCNC: 0.57 MG/DL (ref 0.5–1.4)
CREAT UR-MCNC: 262.9 MG/DL
EOSINOPHIL # BLD AUTO: 0.35 K/UL (ref 0–0.51)
EOSINOPHIL NFR BLD: 3.9 % (ref 0–6.9)
ERYTHROCYTE [DISTWIDTH] IN BLOOD BY AUTOMATED COUNT: 43.2 FL (ref 35.9–50)
EST. AVERAGE GLUCOSE BLD GHB EST-MCNC: 157 MG/DL
GLOBULIN SER CALC-MCNC: 3.1 G/DL (ref 1.9–3.5)
GLUCOSE SERPL-MCNC: 131 MG/DL (ref 65–99)
HBA1C MFR BLD: 7.1 % (ref 0–5.6)
HCT VFR BLD AUTO: 39 % (ref 42–52)
HDLC SERPL-MCNC: 39 MG/DL
HGB BLD-MCNC: 12.5 G/DL (ref 14–18)
IMM GRANULOCYTES # BLD AUTO: 0.02 K/UL (ref 0–0.11)
IMM GRANULOCYTES NFR BLD AUTO: 0.2 % (ref 0–0.9)
LDLC SERPL CALC-MCNC: 82 MG/DL
LYMPHOCYTES # BLD AUTO: 3.55 K/UL (ref 1–4.8)
LYMPHOCYTES NFR BLD: 39.8 % (ref 22–41)
MCH RBC QN AUTO: 30.5 PG (ref 27–33)
MCHC RBC AUTO-ENTMCNC: 32.1 G/DL (ref 33.7–35.3)
MCV RBC AUTO: 95.1 FL (ref 81.4–97.8)
MICROALBUMIN UR-MCNC: 2.5 MG/DL
MICROALBUMIN/CREAT UR: 10 MG/G (ref 0–30)
MONOCYTES # BLD AUTO: 0.55 K/UL (ref 0–0.85)
MONOCYTES NFR BLD AUTO: 6.2 % (ref 0–13.4)
MORPHOLOGY BLD-IMP: NORMAL
NEUTROPHILS # BLD AUTO: 4.42 K/UL (ref 1.82–7.42)
NEUTROPHILS NFR BLD: 49.6 % (ref 44–72)
NRBC # BLD AUTO: 0 K/UL
NRBC BLD-RTO: 0 /100 WBC
PLATELET # BLD AUTO: 186 K/UL (ref 164–446)
PMV BLD AUTO: 11.6 FL (ref 9–12.9)
POTASSIUM SERPL-SCNC: 3.8 MMOL/L (ref 3.6–5.5)
PROT SERPL-MCNC: 7 G/DL (ref 6–8.2)
RBC # BLD AUTO: 4.1 M/UL (ref 4.7–6.1)
SODIUM SERPL-SCNC: 138 MMOL/L (ref 135–145)
TRIGL SERPL-MCNC: 117 MG/DL (ref 0–149)
WBC # BLD AUTO: 8.9 K/UL (ref 4.8–10.8)

## 2018-05-03 PROCEDURE — 82570 ASSAY OF URINE CREATININE: CPT

## 2018-05-03 PROCEDURE — 82105 ALPHA-FETOPROTEIN SERUM: CPT

## 2018-05-03 PROCEDURE — 76700 US EXAM ABDOM COMPLETE: CPT

## 2018-05-03 PROCEDURE — 80061 LIPID PANEL: CPT

## 2018-05-03 PROCEDURE — 83036 HEMOGLOBIN GLYCOSYLATED A1C: CPT | Mod: GA

## 2018-05-03 PROCEDURE — 82043 UR ALBUMIN QUANTITATIVE: CPT

## 2018-05-03 PROCEDURE — 85025 COMPLETE CBC W/AUTO DIFF WBC: CPT

## 2018-05-03 PROCEDURE — 36415 COLL VENOUS BLD VENIPUNCTURE: CPT

## 2018-05-03 PROCEDURE — 80053 COMPREHEN METABOLIC PANEL: CPT

## 2018-05-04 LAB — AFP-TM SERPL-MCNC: 3 NG/ML (ref 0–9)

## 2018-05-15 ENCOUNTER — HOSPITAL ENCOUNTER (OUTPATIENT)
Dept: RADIOLOGY | Facility: MEDICAL CENTER | Age: 68
End: 2018-05-15
Attending: PHYSICIAN ASSISTANT
Payer: MEDICARE

## 2018-05-15 DIAGNOSIS — M54.5 LOW BACK PAIN, UNSPECIFIED BACK PAIN LATERALITY, UNSPECIFIED CHRONICITY, WITH SCIATICA PRESENCE UNSPECIFIED: ICD-10-CM

## 2018-05-15 DIAGNOSIS — M25.511 RIGHT SHOULDER PAIN, UNSPECIFIED CHRONICITY: ICD-10-CM

## 2018-05-15 DIAGNOSIS — M16.4 POST-TRAUMATIC OSTEOARTHRITIS OF BOTH HIPS: ICD-10-CM

## 2018-05-15 PROCEDURE — 73030 X-RAY EXAM OF SHOULDER: CPT | Mod: RT

## 2018-05-15 PROCEDURE — 72110 X-RAY EXAM L-2 SPINE 4/>VWS: CPT

## 2018-05-15 PROCEDURE — 73521 X-RAY EXAM HIPS BI 2 VIEWS: CPT

## 2018-05-30 ENCOUNTER — HOSPITAL ENCOUNTER (OUTPATIENT)
Dept: RADIOLOGY | Facility: MEDICAL CENTER | Age: 68
End: 2018-05-30
Attending: PHYSICIAN ASSISTANT
Payer: MEDICARE

## 2018-05-30 DIAGNOSIS — M51.36 DEGENERATION OF LUMBAR INTERVERTEBRAL DISC: ICD-10-CM

## 2018-05-30 PROCEDURE — 72148 MRI LUMBAR SPINE W/O DYE: CPT

## 2018-06-15 ENCOUNTER — TELEPHONE (OUTPATIENT)
Dept: MEDICAL GROUP | Facility: MEDICAL CENTER | Age: 68
End: 2018-06-15

## 2018-06-15 RX ORDER — POTASSIUM CHLORIDE 20 MEQ/1
TABLET, EXTENDED RELEASE ORAL
Qty: 90 TAB | Refills: 0 | Status: SHIPPED | OUTPATIENT
Start: 2018-06-15 | End: 2018-09-17 | Stop reason: SDUPTHER

## 2018-06-15 NOTE — TELEPHONE ENCOUNTER
Potassium prescription was sent in today.  Oxycodone cannot be refilled without an appointment.  Marlo Reynolds M.D.

## 2018-06-29 ENCOUNTER — OFFICE VISIT (OUTPATIENT)
Dept: MEDICAL GROUP | Facility: MEDICAL CENTER | Age: 68
End: 2018-06-29
Payer: MEDICARE

## 2018-06-29 VITALS
DIASTOLIC BLOOD PRESSURE: 76 MMHG | WEIGHT: 185.6 LBS | BODY MASS INDEX: 26.87 KG/M2 | TEMPERATURE: 97.7 F | SYSTOLIC BLOOD PRESSURE: 156 MMHG | HEART RATE: 116 BPM | OXYGEN SATURATION: 84 %

## 2018-06-29 DIAGNOSIS — G89.29 CHRONIC MIDLINE LOW BACK PAIN WITHOUT SCIATICA: ICD-10-CM

## 2018-06-29 DIAGNOSIS — J44.1 COPD WITH EXACERBATION (HCC): ICD-10-CM

## 2018-06-29 DIAGNOSIS — E11.43 CONTROLLED TYPE 2 DIABETES MELLITUS WITH DIABETIC AUTONOMIC NEUROPATHY, WITHOUT LONG-TERM CURRENT USE OF INSULIN (HCC): ICD-10-CM

## 2018-06-29 DIAGNOSIS — M54.50 CHRONIC MIDLINE LOW BACK PAIN WITHOUT SCIATICA: ICD-10-CM

## 2018-06-29 PROCEDURE — 92250 FUNDUS PHOTOGRAPHY W/I&R: CPT | Mod: TC | Performed by: FAMILY MEDICINE

## 2018-06-29 PROCEDURE — 99214 OFFICE O/P EST MOD 30 MIN: CPT | Performed by: FAMILY MEDICINE

## 2018-06-29 RX ORDER — OXYCODONE AND ACETAMINOPHEN 10; 325 MG/1; MG/1
1 TABLET ORAL EVERY 6 HOURS PRN
Qty: 120 TAB | Refills: 0 | Status: SHIPPED | OUTPATIENT
Start: 2018-08-24 | End: 2018-09-07 | Stop reason: SDUPTHER

## 2018-06-29 RX ORDER — OXYCODONE AND ACETAMINOPHEN 10; 325 MG/1; MG/1
1 TABLET ORAL EVERY 6 HOURS PRN
Qty: 120 TAB | Refills: 0 | Status: SHIPPED | OUTPATIENT
Start: 2018-07-27 | End: 2018-06-29 | Stop reason: SDUPTHER

## 2018-06-29 RX ORDER — PREDNISONE 20 MG/1
40 TABLET ORAL DAILY
Qty: 10 TAB | Refills: 0 | Status: SHIPPED | OUTPATIENT
Start: 2018-06-29 | End: 2018-07-04

## 2018-06-29 RX ORDER — OXYCODONE AND ACETAMINOPHEN 10; 325 MG/1; MG/1
1 TABLET ORAL EVERY 6 HOURS PRN
Qty: 120 TAB | Refills: 0 | Status: SHIPPED | OUTPATIENT
Start: 2018-06-29 | End: 2018-06-29 | Stop reason: SDUPTHER

## 2018-06-29 NOTE — ASSESSMENT & PLAN NOTE
Chronic back pain. Patient has known wedge fracture in Thoracic spine, recently seen on x-ray.  Patient states that Percocet 10 mg tablets, 4 times daily, are not effective at controlling his pain.  Current pain control: good, 4/10  Adverse effects: none.  Physical functioning: good  Mood: fair  Family and social relationships: fair  Sleep pattern: good  Overall functioning: fair  Nonnarcotic treatments that are being used: nothing right now, has tried topical OTC pain relievers    Pain management agreement initiated and signed on: January 2018  Last dose of narcotic medication: this morning  Most recent urine drug screen done October 2017, which was reviewed today and consistent. No longer on Xanax, weaned off.  Aberrant Behaviors: None  I have reviewed the medical records, the Prescription Monitoring Program and I have determined that percocet 10, 4 times daily, is medically indicated.    I have advised patient to keep medication in a safe place and to not drive with medication.

## 2018-06-29 NOTE — PROGRESS NOTES
Subjective:   Mumtaz Carney is a 67 y.o. male here today for back pain, diabetes, COPD    Chronic lower back pain  Chronic back pain. Patient has known wedge fracture in Thoracic spine, recently seen on x-ray.  Patient states that Percocet 10 mg tablets, 4 times daily, are not effective at controlling his pain.  Current pain control: good, 4/10  Adverse effects: none.  Physical functioning: good  Mood: fair  Family and social relationships: fair  Sleep pattern: good  Overall functioning: fair  Nonnarcotic treatments that are being used: nothing right now, has tried topical OTC pain relievers    Pain management agreement initiated and signed on: January 2018  Last dose of narcotic medication: this morning  Most recent urine drug screen done October 2017, which was reviewed today and consistent. No longer on Xanax, weaned off.  Aberrant Behaviors: None  I have reviewed the medical records, the Prescription Monitoring Program and I have determined that percocet 10, 4 times daily, is medically indicated.    I have advised patient to keep medication in a safe place and to not drive with medication.    Diabetes mellitus type 2, controlled  A1c of 7.1, which is up from 6.8 in September.  His last prescription of metformin for 1 year was over one year ago.       Patient has only smoked 1 cigarette in the last 3 months.  He is not using BuSpar.  Patient does complain of some occasional wheezing.  He is requesting a prescription of prednisone to have on hand if wheezing gets severe.    Current medicines (including changes today)  Current Outpatient Prescriptions   Medication Sig Dispense Refill   • predniSONE (DELTASONE) 20 MG Tab Take 2 Tabs by mouth every day for 5 days. 10 Tab 0   • [START ON 8/24/2018] oxyCODONE-acetaminophen (PERCOCET-10)  MG Tab Take 1 Tab by mouth every 6 hours as needed for Severe Pain for up to 30 days. 120 Tab 0   • metformin (GLUCOPHAGE) 1000 MG tablet Take 1 Tab by mouth 2 times a  day, with meals. 180 Tab 3   • potassium chloride SA (KDUR) 20 MEQ Tab CR TAKE 1 TABLET BY MOUTH EVERY DAY 90 Tab 0   • pravastatin (PRAVACHOL) 10 MG Tab TAKE 1 TABLET BY MOUTH EVERY DAY 90 Tab 3   • sertraline (ZOLOFT) 50 MG Tab TAKE 1 TABLET BY MOUTH EVERY DAY 90 Tab 3   • azithromycin (ZITHROMAX) 250 MG Tab Take 2 tablets on day 1, then take 1 tablet a day for 4 days. 6 Tab 0   • tamsulosin (FLOMAX) 0.4 MG capsule Take 2 Caps by mouth every day. 180 Cap 3   • budesonide-formoterol (SYMBICORT) 160-4.5 MCG/ACT Aerosol Inhale 2 Puffs by mouth 2 Times a Day. Use spacer. Rinse mouth after each use. 1 Inhaler 11   • tiotropium (SPIRIVA HANDIHALER) 18 MCG Cap INHALE 1 CAPSULE BY MOUTH VIA HANDIHALER EVERY DAY 90 Cap 3   • albuterol 108 (90 Base) MCG/ACT Aero Soln inhalation aerosol Inhale 2 Puffs by mouth every 6 hours as needed. 8.5 g 6   • furosemide (LASIX) 20 MG Tab TAKE 1 TABLET BY MOUTH DAILY 90 Tab 3   • lisinopril (PRINIVIL) 10 MG Tab TAKE 1 TABLET BY MOUTH EVERY DAY 90 Tab 3   • omeprazole (PRILOSEC) 20 MG delayed-release capsule Take 1 Cap by mouth 2 Times a Day. 180 Cap 3   • amlodipine (NORVASC) 5 MG Tab TAKE 1 TABLET BY MOUTH EVERY DAY 90 Tab 3   • carvedilol (COREG) 3.125 MG Tab TAKE 1 TABLET BY MOUTH TWICE DAILY 180 Tab 3   • aspirin (ASA) 325 MG Tab Take 325 mg by mouth every day.     • ondansetron (ZOFRAN ODT) 4 MG TABLET DISPERSIBLE Take 1 Tab by mouth every 8 hours as needed for Nausea/Vomiting. 10 Tab 0     No current facility-administered medications for this visit.      He  has a past medical history of Aortic stenosis; Breath shortness; Bronchitis; CHF (congestive heart failure) (HCC) (6/11/2010); Cirrhosis (HCC) (2014); COPD; COPD (chronic obstructive pulmonary disease) (HCC); HCV (hepatitis C virus) (1/16/2014); Heart burn; Hepatitis C (1/16/2014); Hypertension; Indigestion; MRSA (methicillin resistant Staphylococcus aureus); Other emphysema (HCC); Other specified disorder of intestines;  Psychiatric problem; Severe aortic stenosis (12/17/2013); Tobacco abuse (6/11/2010); and Wound. He also has no past medical history of Angina; Arrhythmia; Arthritis; ASTHMA; Backpain; Cancer (HCC); CATARACT; Diabetes; Dialysis; Glaucoma; Heart murmur; Jaundice; Myocardial infarct (HCC); Other specified symptom associated with female genital organs; Pacemaker; Personal history of venous thrombosis and embolism; Pneumonia; Renal disorder; Rheumatic fever; Seizure (HCC); Stroke ([facility]); Unspecified disorder of thyroid; Unspecified hemorrhagic conditions; or Unspecified urinary incontinence.    ROS   No chest pain, positive lower back pain       Objective:     Blood pressure 156/76, pulse (!) 116, temperature 36.5 °C (97.7 °F), weight 84.2 kg (185 lb 9.6 oz), SpO2 (!) 84 %. Body mass index is 26.87 kg/m².   Physical Exam:  Constitutional: Alert, no distress.  Skin: Warm, dry, good turgor, no rashes in visible areas.  Eye: Equal, round and reactive, conjunctiva clear, lids normal.  Psych: Alert and oriented x3, normal affect and mood.        Assessment and Plan:   The following treatment plan was discussed    1. Controlled type 2 diabetes mellitus with diabetic autonomic neuropathy, without long-term current use of insulin ([facility])  Controlled.  We will refill metformin.  Follow-up with A1c in clinic in 6 months.  Retinal eye exam done today in clinic.  - POCT Retinal Eye Exam    2. Chronic midline low back pain without sciatica  Controlled.  Refill Percocet 10 mg 4 times daily for 3 months.  Follow-up in 3 months for additional refills.  - oxyCODONE-acetaminophen (PERCOCET-10)  MG Tab; Take 1 Tab by mouth every 6 hours as needed for Severe Pain for up to 30 days.  Dispense: 120 Tab; Refill: 0    3. COPD with exacerbation ([facility])  Prescription for prednisone given today to have on hand.  - predniSONE (DELTASONE) 20 MG Tab; Take 2 Tabs by mouth every day for 5 days.  Dispense: 10 Tab; Refill: 0      Followup: Return in  about 3 months (around 9/29/2018) for Pain medication refill.

## 2018-06-29 NOTE — ASSESSMENT & PLAN NOTE
A1c of 7.1, which is up from 6.8 in September.  His last prescription of metformin for 1 year was over one year ago.

## 2018-07-06 ENCOUNTER — TELEPHONE (OUTPATIENT)
Dept: MEDICAL GROUP | Facility: MEDICAL CENTER | Age: 68
End: 2018-07-06

## 2018-07-06 DIAGNOSIS — J44.1 CHRONIC OBSTRUCTIVE PULMONARY DISEASE WITH ACUTE EXACERBATION (HCC): ICD-10-CM

## 2018-07-06 LAB — RETINAL SCREEN: NEGATIVE

## 2018-07-06 RX ORDER — TIOTROPIUM BROMIDE 18 UG/1
CAPSULE ORAL; RESPIRATORY (INHALATION)
Qty: 90 CAP | Refills: 3 | Status: SHIPPED | OUTPATIENT
Start: 2018-07-06 | End: 2018-11-18 | Stop reason: SDUPTHER

## 2018-07-06 NOTE — TELEPHONE ENCOUNTER
----- Message from Marlo Reynolds M.D. sent at 7/6/2018 11:43 AM PDT -----  Please notify patient that his retinal eye exam shows no diabetic changes.  Patient should have an eye exam done every year to check for diabetic changes.  Marlo Reynolds M.D.

## 2018-07-30 RX ORDER — CARVEDILOL 3.12 MG/1
TABLET ORAL
Qty: 180 TAB | Refills: 3 | Status: SHIPPED | OUTPATIENT
Start: 2018-07-30 | End: 2019-07-30 | Stop reason: SDUPTHER

## 2018-08-02 DIAGNOSIS — Z72.0 TOBACCO ABUSE: Chronic | ICD-10-CM

## 2018-08-02 RX ORDER — BUPROPION HYDROCHLORIDE 150 MG/1
TABLET, EXTENDED RELEASE ORAL
Qty: 180 TAB | Refills: 3 | Status: SHIPPED | OUTPATIENT
Start: 2018-08-02

## 2018-08-02 RX ORDER — AMLODIPINE BESYLATE 5 MG/1
TABLET ORAL
Qty: 90 TAB | Refills: 3 | Status: SHIPPED | OUTPATIENT
Start: 2018-08-02 | End: 2019-08-31 | Stop reason: SDUPTHER

## 2018-08-02 RX ORDER — LISINOPRIL 10 MG/1
TABLET ORAL
Qty: 90 TAB | Refills: 3 | Status: SHIPPED | OUTPATIENT
Start: 2018-08-02 | End: 2018-09-07 | Stop reason: SDUPTHER

## 2018-08-02 RX ORDER — OMEPRAZOLE 20 MG/1
CAPSULE, DELAYED RELEASE ORAL
Qty: 180 CAP | Refills: 3 | Status: SHIPPED | OUTPATIENT
Start: 2018-08-02 | End: 2019-07-30 | Stop reason: SDUPTHER

## 2018-09-04 ENCOUNTER — TELEPHONE (OUTPATIENT)
Dept: MEDICAL GROUP | Facility: MEDICAL CENTER | Age: 68
End: 2018-09-04

## 2018-09-04 DIAGNOSIS — E78.00 PURE HYPERCHOLESTEROLEMIA: ICD-10-CM

## 2018-09-04 DIAGNOSIS — E11.43 CONTROLLED TYPE 2 DIABETES MELLITUS WITH DIABETIC AUTONOMIC NEUROPATHY, WITHOUT LONG-TERM CURRENT USE OF INSULIN (HCC): ICD-10-CM

## 2018-09-04 DIAGNOSIS — Z12.5 PROSTATE CANCER SCREENING: ICD-10-CM

## 2018-09-04 DIAGNOSIS — I10 ESSENTIAL HYPERTENSION: Chronic | ICD-10-CM

## 2018-09-05 ENCOUNTER — HOSPITAL ENCOUNTER (OUTPATIENT)
Dept: LAB | Facility: MEDICAL CENTER | Age: 68
End: 2018-09-05
Attending: FAMILY MEDICINE
Payer: MEDICARE

## 2018-09-05 DIAGNOSIS — E11.43 CONTROLLED TYPE 2 DIABETES MELLITUS WITH DIABETIC AUTONOMIC NEUROPATHY, WITHOUT LONG-TERM CURRENT USE OF INSULIN (HCC): ICD-10-CM

## 2018-09-05 DIAGNOSIS — Z12.5 PROSTATE CANCER SCREENING: ICD-10-CM

## 2018-09-05 DIAGNOSIS — E78.00 PURE HYPERCHOLESTEROLEMIA: ICD-10-CM

## 2018-09-05 DIAGNOSIS — I10 ESSENTIAL HYPERTENSION: Chronic | ICD-10-CM

## 2018-09-05 LAB
ALBUMIN SERPL BCP-MCNC: 4.7 G/DL (ref 3.2–4.9)
ALBUMIN/GLOB SERPL: 1.6 G/DL
ALP SERPL-CCNC: 69 U/L (ref 30–99)
ALT SERPL-CCNC: 24 U/L (ref 2–50)
ANION GAP SERPL CALC-SCNC: 6 MMOL/L (ref 0–11.9)
AST SERPL-CCNC: 16 U/L (ref 12–45)
BASOPHILS # BLD AUTO: 0.5 % (ref 0–1.8)
BASOPHILS # BLD: 0.05 K/UL (ref 0–0.12)
BILIRUB SERPL-MCNC: 0.4 MG/DL (ref 0.1–1.5)
BUN SERPL-MCNC: 25 MG/DL (ref 8–22)
CALCIUM SERPL-MCNC: 9.5 MG/DL (ref 8.5–10.5)
CHLORIDE SERPL-SCNC: 95 MMOL/L (ref 96–112)
CHOLEST SERPL-MCNC: 172 MG/DL (ref 100–199)
CO2 SERPL-SCNC: 37 MMOL/L (ref 20–33)
CREAT SERPL-MCNC: 0.84 MG/DL (ref 0.5–1.4)
CREAT UR-MCNC: 262.6 MG/DL
EOSINOPHIL # BLD AUTO: 0.38 K/UL (ref 0–0.51)
EOSINOPHIL NFR BLD: 3.5 % (ref 0–6.9)
ERYTHROCYTE [DISTWIDTH] IN BLOOD BY AUTOMATED COUNT: 42.7 FL (ref 35.9–50)
EST. AVERAGE GLUCOSE BLD GHB EST-MCNC: 163 MG/DL
GLOBULIN SER CALC-MCNC: 3 G/DL (ref 1.9–3.5)
GLUCOSE SERPL-MCNC: 228 MG/DL (ref 65–99)
HBA1C MFR BLD: 7.3 % (ref 0–5.6)
HCT VFR BLD AUTO: 39.9 % (ref 42–52)
HDLC SERPL-MCNC: 42 MG/DL
HGB BLD-MCNC: 12.5 G/DL (ref 14–18)
IMM GRANULOCYTES # BLD AUTO: 0.03 K/UL (ref 0–0.11)
IMM GRANULOCYTES NFR BLD AUTO: 0.3 % (ref 0–0.9)
LDLC SERPL CALC-MCNC: 96 MG/DL
LYMPHOCYTES # BLD AUTO: 3.91 K/UL (ref 1–4.8)
LYMPHOCYTES NFR BLD: 35.9 % (ref 22–41)
MCH RBC QN AUTO: 30 PG (ref 27–33)
MCHC RBC AUTO-ENTMCNC: 31.3 G/DL (ref 33.7–35.3)
MCV RBC AUTO: 95.9 FL (ref 81.4–97.8)
MICROALBUMIN UR-MCNC: 1.3 MG/DL
MICROALBUMIN/CREAT UR: 5 MG/G (ref 0–30)
MONOCYTES # BLD AUTO: 0.79 K/UL (ref 0–0.85)
MONOCYTES NFR BLD AUTO: 7.3 % (ref 0–13.4)
NEUTROPHILS # BLD AUTO: 5.72 K/UL (ref 1.82–7.42)
NEUTROPHILS NFR BLD: 52.5 % (ref 44–72)
NRBC # BLD AUTO: 0 K/UL
NRBC BLD-RTO: 0 /100 WBC
PLATELET # BLD AUTO: 230 K/UL (ref 164–446)
PMV BLD AUTO: 11 FL (ref 9–12.9)
POTASSIUM SERPL-SCNC: 5 MMOL/L (ref 3.6–5.5)
PROT SERPL-MCNC: 7.7 G/DL (ref 6–8.2)
PSA SERPL-MCNC: 0.39 NG/ML (ref 0–4)
RBC # BLD AUTO: 4.16 M/UL (ref 4.7–6.1)
SODIUM SERPL-SCNC: 138 MMOL/L (ref 135–145)
TRIGL SERPL-MCNC: 168 MG/DL (ref 0–149)
WBC # BLD AUTO: 10.9 K/UL (ref 4.8–10.8)

## 2018-09-05 PROCEDURE — 36415 COLL VENOUS BLD VENIPUNCTURE: CPT

## 2018-09-05 PROCEDURE — 85025 COMPLETE CBC W/AUTO DIFF WBC: CPT

## 2018-09-05 PROCEDURE — 84153 ASSAY OF PSA TOTAL: CPT | Mod: GA

## 2018-09-05 PROCEDURE — 80061 LIPID PANEL: CPT

## 2018-09-05 PROCEDURE — 83036 HEMOGLOBIN GLYCOSYLATED A1C: CPT | Mod: GA

## 2018-09-05 PROCEDURE — 82043 UR ALBUMIN QUANTITATIVE: CPT

## 2018-09-05 PROCEDURE — 82570 ASSAY OF URINE CREATININE: CPT

## 2018-09-05 PROCEDURE — 80053 COMPREHEN METABOLIC PANEL: CPT

## 2018-09-07 ENCOUNTER — OFFICE VISIT (OUTPATIENT)
Dept: MEDICAL GROUP | Facility: MEDICAL CENTER | Age: 68
End: 2018-09-07
Payer: MEDICARE

## 2018-09-07 VITALS
TEMPERATURE: 97.9 F | WEIGHT: 194 LBS | SYSTOLIC BLOOD PRESSURE: 168 MMHG | RESPIRATION RATE: 16 BRPM | HEIGHT: 70 IN | OXYGEN SATURATION: 90 % | HEART RATE: 103 BPM | BODY MASS INDEX: 27.77 KG/M2 | DIASTOLIC BLOOD PRESSURE: 78 MMHG

## 2018-09-07 DIAGNOSIS — J44.9 CHRONIC OBSTRUCTIVE PULMONARY DISEASE, UNSPECIFIED COPD TYPE (HCC): Chronic | ICD-10-CM

## 2018-09-07 DIAGNOSIS — E11.42 CONTROLLED TYPE 2 DIABETES MELLITUS WITH DIABETIC POLYNEUROPATHY, WITHOUT LONG-TERM CURRENT USE OF INSULIN (HCC): ICD-10-CM

## 2018-09-07 DIAGNOSIS — I10 ESSENTIAL HYPERTENSION: Chronic | ICD-10-CM

## 2018-09-07 DIAGNOSIS — M54.50 CHRONIC MIDLINE LOW BACK PAIN WITHOUT SCIATICA: ICD-10-CM

## 2018-09-07 DIAGNOSIS — G89.29 CHRONIC MIDLINE LOW BACK PAIN WITHOUT SCIATICA: ICD-10-CM

## 2018-09-07 PROCEDURE — 99214 OFFICE O/P EST MOD 30 MIN: CPT | Performed by: FAMILY MEDICINE

## 2018-09-07 RX ORDER — OXYCODONE AND ACETAMINOPHEN 10; 325 MG/1; MG/1
1 TABLET ORAL
Qty: 150 TAB | Refills: 0 | Status: SHIPPED | OUTPATIENT
Start: 2018-11-02 | End: 2018-11-30 | Stop reason: SDUPTHER

## 2018-09-07 RX ORDER — OXYCODONE AND ACETAMINOPHEN 10; 325 MG/1; MG/1
1 TABLET ORAL
Qty: 150 TAB | Refills: 0 | Status: SHIPPED | OUTPATIENT
Start: 2018-10-05 | End: 2018-09-07 | Stop reason: SDUPTHER

## 2018-09-07 RX ORDER — OXYCODONE AND ACETAMINOPHEN 10; 325 MG/1; MG/1
1 TABLET ORAL
Qty: 150 TAB | Refills: 0 | Status: SHIPPED | OUTPATIENT
Start: 2018-09-07 | End: 2018-09-07 | Stop reason: SDUPTHER

## 2018-09-07 RX ORDER — OXYCODONE AND ACETAMINOPHEN 10; 325 MG/1; MG/1
1 TABLET ORAL EVERY 6 HOURS PRN
Qty: 120 TAB | Refills: 0 | Status: SHIPPED | OUTPATIENT
Start: 2018-09-07 | End: 2018-09-07 | Stop reason: SDUPTHER

## 2018-09-07 RX ORDER — LISINOPRIL 20 MG/1
20 TABLET ORAL DAILY
Qty: 90 TAB | Refills: 3 | Status: SHIPPED | OUTPATIENT
Start: 2018-09-07 | End: 2019-11-13 | Stop reason: SDUPTHER

## 2018-09-07 RX ORDER — LISINOPRIL 40 MG/1
40 TABLET ORAL
Qty: 90 TAB | Refills: 3 | Status: SHIPPED | OUTPATIENT
Start: 2018-09-07 | End: 2018-09-07

## 2018-09-07 RX ORDER — OXYCODONE AND ACETAMINOPHEN 10; 325 MG/1; MG/1
1 TABLET ORAL EVERY 6 HOURS PRN
Qty: 120 TAB | Refills: 0 | Status: SHIPPED | OUTPATIENT
Start: 2018-11-02 | End: 2018-09-07 | Stop reason: SDUPTHER

## 2018-09-07 RX ORDER — OXYCODONE AND ACETAMINOPHEN 10; 325 MG/1; MG/1
1 TABLET ORAL EVERY 6 HOURS PRN
Qty: 120 TAB | Refills: 0 | Status: SHIPPED | OUTPATIENT
Start: 2018-10-05 | End: 2018-09-07 | Stop reason: SDUPTHER

## 2018-09-07 NOTE — ASSESSMENT & PLAN NOTE
A1c of 7.3, up from last time 7.1. He admits to eating poorly.  Patient is compliant with metformin 1000 mg twice daily.  His weight has gone up about 30 pounds in the last year.

## 2018-09-07 NOTE — ASSESSMENT & PLAN NOTE
Chronic back pain. Patient has known wedge fracture in Thoracic spine, recently seen on x-ray.  Patient states that Percocet 10 mg tablets, 4 times daily, are not effective at controlling his pain. He is requesting to increase Percocet to 5 times daily.  Current pain control: good, 4/10  Adverse effects: none.  Physical functioning: good  Mood: fair  Family and social relationships: fair  Sleep pattern: good  Overall functioning: fair  Nonnarcotic treatments that are being used: nothing right now, has tried topical OTC pain relievers    Pain management agreement initiated and signed on: January 2018  Last dose of narcotic medication: this morning  Most recent urine drug screen done October 2017, which was reviewed today and consistent. No longer on Xanax, weaned off.  Aberrant Behaviors: None  I have reviewed the medical records, the Prescription Monitoring Program and I have determined that percocet 10, 5 times daily, is medically indicated.    I have advised patient to keep medication in a safe place and to not drive with medication.

## 2018-09-07 NOTE — PROGRESS NOTES
Subjective:   Mumtaz Carney is a 67 y.o. male here today for back pain    Diabetes mellitus type 2, controlled  A1c of 7.3, up from last time 7.1. He admits to eating poorly.  Patient is compliant with metformin 1000 mg twice daily.  His weight has gone up about 30 pounds in the last year.    Chronic lower back pain  Chronic back pain. Patient has known wedge fracture in Thoracic spine, recently seen on x-ray.  Patient states that Percocet 10 mg tablets, 4 times daily, are not effective at controlling his pain. He is requesting to increase Percocet to 5 times daily.  Current pain control: good, 4/10  Adverse effects: none.  Physical functioning: good  Mood: fair  Family and social relationships: fair  Sleep pattern: good  Overall functioning: fair  Nonnarcotic treatments that are being used: nothing right now, has tried topical OTC pain relievers    Pain management agreement initiated and signed on: January 2018  Last dose of narcotic medication: this morning  Most recent urine drug screen done October 2017, which was reviewed today and consistent. No longer on Xanax, weaned off.  Aberrant Behaviors: None  I have reviewed the medical records, the Prescription Monitoring Program and I have determined that percocet 10, 5 times daily, is medically indicated.    I have advised patient to keep medication in a safe place and to not drive with medication.    HTN (hypertension)  Blood pressure has been uncontrolled over the last several visits. He is on amlodipine 5 mg daily, lisinopril 10 mg daily and Coreg 3.125 mg twice daily.    COPD (chronic obstructive pulmonary disease)  He has cut back cigs significantly, only has smoked 3 cigs in the past 3 months.  He has gained 20 pounds, which makes it more difficult to breath.         Current medicines (including changes today)  Current Outpatient Prescriptions   Medication Sig Dispense Refill   • [START ON 11/2/2018] oxyCODONE-acetaminophen (PERCOCET-10)  MG Tab  Take 1 Tab by mouth 5 Times a Day for 30 days. 150 Tab 0   • lisinopril (PRINIVIL) 20 MG Tab Take 1 Tab by mouth every day. 90 Tab 3   • omeprazole (PRILOSEC) 20 MG delayed-release capsule TAKE 1 CAPSULE BY MOUTH TWICE DAILY 180 Cap 3   • buPROPion SR (WELLBUTRIN-SR) 150 MG TABLET SR 12 HR sustained-release tablet TAKE 1 TABLET BY MOUTH TWICE DAILY 180 Tab 3   • amLODIPine (NORVASC) 5 MG Tab TAKE 1 TABLET BY MOUTH EVERY DAY 90 Tab 3   • carvedilol (COREG) 3.125 MG Tab TAKE 1 TABLET BY MOUTH TWICE DAILY 180 Tab 3   • SPIRIVA HANDIHALER 18 MCG Cap INHALE 1 CAPSULE BY MOUTH VIA HANDIHALER EVERY DAY 90 Cap 3   • metformin (GLUCOPHAGE) 1000 MG tablet Take 1 Tab by mouth 2 times a day, with meals. For diabetes 180 Tab 3   • potassium chloride SA (KDUR) 20 MEQ Tab CR TAKE 1 TABLET BY MOUTH EVERY DAY 90 Tab 0   • pravastatin (PRAVACHOL) 10 MG Tab TAKE 1 TABLET BY MOUTH EVERY DAY 90 Tab 3   • sertraline (ZOLOFT) 50 MG Tab TAKE 1 TABLET BY MOUTH EVERY DAY 90 Tab 3   • azithromycin (ZITHROMAX) 250 MG Tab Take 2 tablets on day 1, then take 1 tablet a day for 4 days. 6 Tab 0   • tamsulosin (FLOMAX) 0.4 MG capsule Take 2 Caps by mouth every day. 180 Cap 3   • budesonide-formoterol (SYMBICORT) 160-4.5 MCG/ACT Aerosol Inhale 2 Puffs by mouth 2 Times a Day. Use spacer. Rinse mouth after each use. 1 Inhaler 11   • albuterol 108 (90 Base) MCG/ACT Aero Soln inhalation aerosol Inhale 2 Puffs by mouth every 6 hours as needed. 8.5 g 6   • furosemide (LASIX) 20 MG Tab TAKE 1 TABLET BY MOUTH DAILY 90 Tab 3   • aspirin (ASA) 325 MG Tab Take 325 mg by mouth every day.     • ondansetron (ZOFRAN ODT) 4 MG TABLET DISPERSIBLE Take 1 Tab by mouth every 8 hours as needed for Nausea/Vomiting. 10 Tab 0     No current facility-administered medications for this visit.      He  has a past medical history of Aortic stenosis; Breath shortness; Bronchitis; CHF (congestive heart failure) (HCC) (6/11/2010); Cirrhosis (HCC) (2014); COPD; COPD (chronic  "obstructive pulmonary disease) (HCC); HCV (hepatitis C virus) (1/16/2014); Heart burn; Hepatitis C (1/16/2014); Hypertension; Indigestion; MRSA (methicillin resistant Staphylococcus aureus); Other emphysema (HCC); Other specified disorder of intestines; Psychiatric problem; Severe aortic stenosis (12/17/2013); Tobacco abuse (6/11/2010); and Wound. He also has no past medical history of Angina; Arrhythmia; Arthritis; ASTHMA; Backpain; Cancer (HCC); CATARACT; Diabetes; Dialysis; Glaucoma; Heart murmur; Jaundice; Myocardial infarct (HCC); Other specified symptom associated with female genital organs; Pacemaker; Personal history of venous thrombosis and embolism; Pneumonia; Renal disorder; Rheumatic fever; Seizure ([facility]); Stroke ([facility]); Unspecified disorder of thyroid; Unspecified hemorrhagic conditions; or Unspecified urinary incontinence.    ROS   No chest pain, no shortness of breath       Objective:     Blood pressure (!) 168/78, pulse (!) 103, temperature 36.6 °C (97.9 °F), resp. rate 16, height 1.77 m (5' 9.69\"), weight 88 kg (194 lb), SpO2 90 %. Body mass index is 28.09 kg/m².   Physical Exam:  Constitutional: Alert, no distress.  Skin: Warm, dry, good turgor, no rashes in visible areas.  Eye: Equal, round and reactive, conjunctiva clear, lids normal.  Psych: Alert and oriented x3, normal affect and mood.        Assessment and Plan:   The following treatment plan was discussed    1. Controlled type 2 diabetes mellitus with diabetic polyneuropathy, without long-term current use of insulin ([facility])  Discussed improving diet and trying to lose weight.  Continue metformin.  Consider repeat A1c in clinic in 6 months.    2. Chronic midline low back pain without sciatica  Uncontrolled.  Increase Percocet from 4 times a day to 5 times a day.  Follow-up in 3 months for additional refills.  [facility] drug screen done today.  - [facility] SCREEN; Future  - oxyCODONE-acetaminophen (PERCOCET-10)  MG Tab; " Take 1 Tab by mouth 5 Times a Day for 30 days.  Dispense: 150 Tab; Refill: 0    3. Essential hypertension  Uncontrolled.  Increase lisinopril from 10 mg daily to 20 mg daily.  Continue other medication for blood pressure.  - lisinopril (PRINIVIL) 20 MG Tab; Take 1 Tab by mouth every day.  Dispense: 90 Tab; Refill: 3    4. Chronic obstructive pulmonary disease, unspecified COPD type (HCC)  Stable.  Continue with not smoking and current inhalers.      Followup: Return in about 3 months (around 12/7/2018) for Pain medication refill, HTN.

## 2018-09-07 NOTE — ASSESSMENT & PLAN NOTE
He has cut back cigs significantly, only has smoked 3 cigs in the past 3 months.  He has gained 20 pounds, which makes it more difficult to breath.

## 2018-09-07 NOTE — ASSESSMENT & PLAN NOTE
Blood pressure has been uncontrolled over the last several visits. He is on amlodipine 5 mg daily, lisinopril 10 mg daily and Coreg 3.125 mg twice daily.

## 2018-09-17 RX ORDER — POTASSIUM CHLORIDE 20 MEQ/1
TABLET, EXTENDED RELEASE ORAL
Qty: 90 TAB | Refills: 3 | Status: SHIPPED | OUTPATIENT
Start: 2018-09-17 | End: 2019-09-17 | Stop reason: SDUPTHER

## 2018-10-08 RX ORDER — FUROSEMIDE 20 MG/1
TABLET ORAL
Qty: 90 TAB | Refills: 3 | Status: SHIPPED | OUTPATIENT
Start: 2018-10-08 | End: 2019-12-27

## 2018-11-18 DIAGNOSIS — J44.1 CHRONIC OBSTRUCTIVE PULMONARY DISEASE WITH ACUTE EXACERBATION (HCC): ICD-10-CM

## 2018-11-19 RX ORDER — TIOTROPIUM BROMIDE 18 UG/1
CAPSULE ORAL; RESPIRATORY (INHALATION)
Qty: 90 CAP | Refills: 3 | Status: SHIPPED | OUTPATIENT
Start: 2018-11-19 | End: 2019-11-13 | Stop reason: SDUPTHER

## 2018-11-21 RX ORDER — ALBUTEROL SULFATE 90 UG/1
2 AEROSOL, METERED RESPIRATORY (INHALATION) EVERY 6 HOURS PRN
Qty: 18 G | Refills: 2 | Status: SHIPPED | OUTPATIENT
Start: 2018-11-21 | End: 2019-05-31 | Stop reason: SDUPTHER

## 2018-11-30 ENCOUNTER — OFFICE VISIT (OUTPATIENT)
Dept: MEDICAL GROUP | Facility: MEDICAL CENTER | Age: 68
End: 2018-11-30
Payer: MEDICARE

## 2018-11-30 VITALS
HEART RATE: 117 BPM | TEMPERATURE: 97.3 F | WEIGHT: 200 LBS | DIASTOLIC BLOOD PRESSURE: 70 MMHG | BODY MASS INDEX: 28.63 KG/M2 | HEIGHT: 70 IN | OXYGEN SATURATION: 97 % | SYSTOLIC BLOOD PRESSURE: 148 MMHG

## 2018-11-30 DIAGNOSIS — J44.9 CHRONIC OBSTRUCTIVE PULMONARY DISEASE, UNSPECIFIED COPD TYPE (HCC): Chronic | ICD-10-CM

## 2018-11-30 DIAGNOSIS — G89.29 CHRONIC MIDLINE LOW BACK PAIN WITHOUT SCIATICA: ICD-10-CM

## 2018-11-30 DIAGNOSIS — M54.50 CHRONIC MIDLINE LOW BACK PAIN WITHOUT SCIATICA: ICD-10-CM

## 2018-11-30 DIAGNOSIS — E11.42 CONTROLLED TYPE 2 DIABETES MELLITUS WITH DIABETIC POLYNEUROPATHY, WITHOUT LONG-TERM CURRENT USE OF INSULIN (HCC): ICD-10-CM

## 2018-11-30 PROCEDURE — 99214 OFFICE O/P EST MOD 30 MIN: CPT | Performed by: FAMILY MEDICINE

## 2018-11-30 RX ORDER — OXYCODONE AND ACETAMINOPHEN 10; 325 MG/1; MG/1
1 TABLET ORAL
Qty: 150 TAB | Refills: 0 | Status: SHIPPED | OUTPATIENT
Start: 2018-12-28 | End: 2018-11-30 | Stop reason: SDUPTHER

## 2018-11-30 RX ORDER — PREDNISONE 20 MG/1
40 TABLET ORAL DAILY
Qty: 10 TAB | Refills: 0 | Status: SHIPPED | OUTPATIENT
Start: 2018-11-30 | End: 2018-11-30 | Stop reason: SDUPTHER

## 2018-11-30 RX ORDER — AZITHROMYCIN 250 MG/1
TABLET, FILM COATED ORAL
Qty: 6 TAB | Refills: 0 | Status: SHIPPED | OUTPATIENT
Start: 2018-11-30 | End: 2018-11-30 | Stop reason: SDUPTHER

## 2018-11-30 RX ORDER — AZITHROMYCIN 250 MG/1
TABLET, FILM COATED ORAL
Qty: 6 TAB | Refills: 0 | Status: SHIPPED | OUTPATIENT
Start: 2018-11-30 | End: 2019-05-31 | Stop reason: SDUPTHER

## 2018-11-30 RX ORDER — PREDNISONE 20 MG/1
40 TABLET ORAL DAILY
Qty: 10 TAB | Refills: 0 | Status: SHIPPED
Start: 2018-11-30 | End: 2018-12-05

## 2018-11-30 RX ORDER — OXYCODONE AND ACETAMINOPHEN 10; 325 MG/1; MG/1
1 TABLET ORAL
Qty: 150 TAB | Refills: 0 | Status: SHIPPED | OUTPATIENT
Start: 2018-11-30 | End: 2018-11-30 | Stop reason: SDUPTHER

## 2018-11-30 RX ORDER — OXYCODONE AND ACETAMINOPHEN 10; 325 MG/1; MG/1
1 TABLET ORAL
Qty: 150 TAB | Refills: 0 | Status: SHIPPED | OUTPATIENT
Start: 2019-01-25 | End: 2019-03-01 | Stop reason: SDUPTHER

## 2018-11-30 NOTE — PROGRESS NOTES
Subjective:   Mumtaz Carney is a 68 y.o. male here today for lower back pain, COPD, diabetes    Chronic lower back pain  Chronic back pain. Patient has known wedge fracture in Thoracic spine, recently seen on x-ray.  Patient states that Percocet 10 mg tablets, 4 times daily, are not effective at controlling his pain. He is requesting to increase Percocet to 5 times daily.  Current pain control: good, 4/10  Adverse effects: none.  Physical functioning: good  Mood: fair  Family and social relationships: fair  Sleep pattern: good  Overall functioning: fair  Nonnarcotic treatments that are being used: nothing right now, has tried topical OTC pain relievers    Pain management agreement initiated and signed on: January 2018  Last dose of narcotic medication: this morning  Most recent urine drug screen done October 2017, which was reviewed today and consistent. No longer on Xanax, weaned off.  Aberrant Behaviors: None  I have reviewed the medical records, the Prescription Monitoring Program and I have determined that percocet 10, 5 times daily, is medically indicated.    I have advised patient to keep medication in a safe place and to not drive with medication.    COPD (chronic obstructive pulmonary disease)  He has quit smoking entirely for the last 6 months. He has increase weight. Complaining of increased shortness of breath. Planning to follow up with pulmonology.    Diabetes mellitus type 2, controlled  Tolerating metformin 1000 mg twice daily. Last A1c was 3 months ago at 7.2.         Current medicines (including changes today)  Current Outpatient Prescriptions   Medication Sig Dispense Refill   • [START ON 1/25/2019] oxyCODONE-acetaminophen (PERCOCET-10)  MG Tab Take 1 Tab by mouth 5 Times a Day for 30 days. 150 Tab 0   • azithromycin (ZITHROMAX) 250 MG Tab Take 2 tablets on day 1, then take 1 tablet a day for 4 days. 6 Tab 0   • predniSONE (DELTASONE) 20 MG Tab Take 2 Tabs by mouth every day for 5  days. 10 Tab 0   • VENTOLIN  (90 Base) MCG/ACT Aero Soln inhalation aerosol INHALE 2 PUFFS BY MOUTH EVERY 6 HOURS AS NEEDED 18 g 2   • SPIRIVA HANDIHALER 18 MCG Cap INHALE 1 CAPSULE BY MOUTH VIA HANDIHALER EVERY DAY 90 Cap 3   • furosemide (LASIX) 20 MG Tab TAKE 1 TABLET BY MOUTH DAILY 90 Tab 3   • potassium chloride SA (KDUR) 20 MEQ Tab CR TAKE 1 TABLET BY MOUTH EVERY DAY 90 Tab 3   • lisinopril (PRINIVIL) 20 MG Tab Take 1 Tab by mouth every day. 90 Tab 3   • omeprazole (PRILOSEC) 20 MG delayed-release capsule TAKE 1 CAPSULE BY MOUTH TWICE DAILY 180 Cap 3   • buPROPion SR (WELLBUTRIN-SR) 150 MG TABLET SR 12 HR sustained-release tablet TAKE 1 TABLET BY MOUTH TWICE DAILY 180 Tab 3   • amLODIPine (NORVASC) 5 MG Tab TAKE 1 TABLET BY MOUTH EVERY DAY 90 Tab 3   • carvedilol (COREG) 3.125 MG Tab TAKE 1 TABLET BY MOUTH TWICE DAILY 180 Tab 3   • metformin (GLUCOPHAGE) 1000 MG tablet Take 1 Tab by mouth 2 times a day, with meals. For diabetes 180 Tab 3   • pravastatin (PRAVACHOL) 10 MG Tab TAKE 1 TABLET BY MOUTH EVERY DAY 90 Tab 3   • sertraline (ZOLOFT) 50 MG Tab TAKE 1 TABLET BY MOUTH EVERY DAY 90 Tab 3   • tamsulosin (FLOMAX) 0.4 MG capsule Take 2 Caps by mouth every day. 180 Cap 3   • budesonide-formoterol (SYMBICORT) 160-4.5 MCG/ACT Aerosol Inhale 2 Puffs by mouth 2 Times a Day. Use spacer. Rinse mouth after each use. 1 Inhaler 11   • aspirin (ASA) 325 MG Tab Take 325 mg by mouth every day.     • ondansetron (ZOFRAN ODT) 4 MG TABLET DISPERSIBLE Take 1 Tab by mouth every 8 hours as needed for Nausea/Vomiting. 10 Tab 0     No current facility-administered medications for this visit.      He  has a past medical history of Aortic stenosis; Breath shortness; Bronchitis; CHF (congestive heart failure) (HCC) (6/11/2010); Cirrhosis (HCC) (2014); COPD; COPD (chronic obstructive pulmonary disease) (HCC); HCV (hepatitis C virus) (1/16/2014); Heart burn; Hepatitis C (1/16/2014); Hypertension; Indigestion; MRSA (methicillin  "resistant Staphylococcus aureus); Other emphysema (HCC); Other specified disorder of intestines; Psychiatric problem; Severe aortic stenosis (12/17/2013); Tobacco abuse (6/11/2010); and Wound. He also has no past medical history of Angina; Arrhythmia; Arthritis; ASTHMA; Backpain; Cancer (HCC); CATARACT; Diabetes; Dialysis; Glaucoma; Heart murmur; Jaundice; Myocardial infarct (HCC); Other specified symptom associated with female genital organs; Pacemaker; Personal history of venous thrombosis and embolism; Pneumonia; Renal disorder; Rheumatic fever; Seizure (HCC); Stroke (HCC); Unspecified disorder of thyroid; Unspecified hemorrhagic conditions; or Unspecified urinary incontinence.    ROS   No chest pain, no shortness of breath       Objective:     Blood pressure 148/70, pulse (!) 117, temperature 36.3 °C (97.3 °F), height 1.77 m (5' 9.69\"), weight 90.7 kg (200 lb), SpO2 97 %. Body mass index is 28.95 kg/m².   Physical Exam:  Constitutional: Alert, no distress.  Skin: Warm, dry, good turgor, no rashes in visible areas.  Eye: Equal, round and reactive, conjunctiva clear, lids normal.  Psych: Alert and oriented x3, normal affect and mood.        Assessment and Plan:   The following treatment plan was discussed    1. Chronic midline low back pain without sciatica  Controlled.  Refill Percocet 10 mg 5 times daily for the next 3 months.  Follow-up in 3 months for additional refills.  Repeat [employer] drug screen tonight because last sample was rejected because sample was not sent to the lab within 30 days.  - oxyCODONE-acetaminophen (PERCOCET-10)  MG Tab; Take 1 Tab by mouth 5 Times a Day for 30 days.  Dispense: 150 Tab; Refill: 0  - [facility] SCREEN; Future    2. Controlled type 2 diabetes mellitus with diabetic polyneuropathy, without long-term current use of insulin ([facility])  Controlled.  Continue current medication.  Follow-up with labs in 3 months.  - COMP METABOLIC PANEL; Future  - HEMOGLOBIN " A1C; Future  - MICROALBUMIN CREAT RATIO URINE; Future  - Lipid Profile; Future  - CBC WITH DIFFERENTIAL; Future    3. Chronic obstructive pulmonary disease, unspecified COPD type (HCC)  Slight exacerbation.  Prescription for prednisone and azithromycin.  Advised patient to follow-up with pulmonology for long-term treatment.  Congratulated patient on quitting smoking.  - azithromycin (ZITHROMAX) 250 MG Tab; Take 2 tablets on day 1, then take 1 tablet a day for 4 days.  Dispense: 6 Tab; Refill: 0  - predniSONE (DELTASONE) 20 MG Tab; Take 2 Tabs by mouth every day for 5 days.  Dispense: 10 Tab; Refill: 0      Followup: Return in about 3 months (around 2/28/2019) for Pain medication refill, Diabetes.

## 2018-11-30 NOTE — ASSESSMENT & PLAN NOTE
He has quit smoking entirely for the last 6 months. He has increase weight. Complaining of increased shortness of breath. Planning to follow up with pulmonology.

## 2018-12-18 DIAGNOSIS — J44.1 COPD WITH EXACERBATION (HCC): ICD-10-CM

## 2018-12-18 RX ORDER — BUDESONIDE AND FORMOTEROL FUMARATE DIHYDRATE 160; 4.5 UG/1; UG/1
AEROSOL RESPIRATORY (INHALATION)
Qty: 10.2 G | Refills: 11 | Status: SHIPPED | OUTPATIENT
Start: 2018-12-18 | End: 2020-02-07 | Stop reason: SDUPTHER

## 2019-03-01 ENCOUNTER — OFFICE VISIT (OUTPATIENT)
Dept: MEDICAL GROUP | Facility: MEDICAL CENTER | Age: 69
End: 2019-03-01
Payer: MEDICARE

## 2019-03-01 VITALS
DIASTOLIC BLOOD PRESSURE: 78 MMHG | HEIGHT: 70 IN | TEMPERATURE: 96.8 F | SYSTOLIC BLOOD PRESSURE: 182 MMHG | HEART RATE: 116 BPM | OXYGEN SATURATION: 88 % | BODY MASS INDEX: 27.06 KG/M2 | WEIGHT: 189 LBS

## 2019-03-01 DIAGNOSIS — E11.42 CONTROLLED TYPE 2 DIABETES MELLITUS WITH DIABETIC POLYNEUROPATHY, WITHOUT LONG-TERM CURRENT USE OF INSULIN (HCC): ICD-10-CM

## 2019-03-01 DIAGNOSIS — G89.29 CHRONIC RIGHT SHOULDER PAIN: ICD-10-CM

## 2019-03-01 DIAGNOSIS — M54.50 CHRONIC MIDLINE LOW BACK PAIN WITHOUT SCIATICA: ICD-10-CM

## 2019-03-01 DIAGNOSIS — M25.511 CHRONIC RIGHT SHOULDER PAIN: ICD-10-CM

## 2019-03-01 DIAGNOSIS — G89.29 CHRONIC MIDLINE LOW BACK PAIN WITHOUT SCIATICA: ICD-10-CM

## 2019-03-01 PROCEDURE — 99214 OFFICE O/P EST MOD 30 MIN: CPT | Mod: 25 | Performed by: FAMILY MEDICINE

## 2019-03-01 PROCEDURE — 20610 DRAIN/INJ JOINT/BURSA W/O US: CPT | Mod: RT | Performed by: FAMILY MEDICINE

## 2019-03-01 RX ORDER — OXYCODONE AND ACETAMINOPHEN 10; 325 MG/1; MG/1
1 TABLET ORAL
Qty: 150 TAB | Refills: 0 | Status: SHIPPED | OUTPATIENT
Start: 2019-04-26 | End: 2019-05-31 | Stop reason: SDUPTHER

## 2019-03-01 RX ORDER — OXYCODONE AND ACETAMINOPHEN 10; 325 MG/1; MG/1
1 TABLET ORAL
Qty: 150 TAB | Refills: 0 | Status: SHIPPED | OUTPATIENT
Start: 2019-03-01 | End: 2019-03-01 | Stop reason: SDUPTHER

## 2019-03-01 RX ORDER — TRIAMCINOLONE ACETONIDE 40 MG/ML
40 INJECTION, SUSPENSION INTRA-ARTICULAR; INTRAMUSCULAR ONCE
Status: COMPLETED | OUTPATIENT
Start: 2019-03-01 | End: 2019-03-01

## 2019-03-01 RX ORDER — OXYCODONE AND ACETAMINOPHEN 10; 325 MG/1; MG/1
1 TABLET ORAL
Qty: 150 TAB | Refills: 0 | Status: SHIPPED | OUTPATIENT
Start: 2019-03-29 | End: 2019-03-01 | Stop reason: SDUPTHER

## 2019-03-01 RX ORDER — LIDOCAINE HYDROCHLORIDE 10 MG/ML
1 INJECTION, SOLUTION EPIDURAL; INFILTRATION; INTRACAUDAL; PERINEURAL ONCE
Status: COMPLETED | OUTPATIENT
Start: 2019-03-01 | End: 2019-03-01

## 2019-03-01 RX ADMIN — TRIAMCINOLONE ACETONIDE 40 MG: 40 INJECTION, SUSPENSION INTRA-ARTICULAR; INTRAMUSCULAR at 13:47

## 2019-03-01 RX ADMIN — LIDOCAINE HYDROCHLORIDE 1 ML: 10 INJECTION, SOLUTION EPIDURAL; INFILTRATION; INTRACAUDAL; PERINEURAL at 13:46

## 2019-03-01 NOTE — ASSESSMENT & PLAN NOTE
Having recurrent right shoulder pain. Had an injection 6 months ago which was beneficial, requesting repeat injection today.    Patient is having difficulty combing his hair and with lateral shoulder raise.  X-ray from about 9 months ago shows mild osteoarthritis.

## 2019-03-01 NOTE — ASSESSMENT & PLAN NOTE
Chronic back pain. Patient has known wedge fracture in Thoracic spine, recently seen on x-ray.   Percocet 10 mg tablets, 5 times daily  Current pain control: good, 4/10  Adverse effects: none.  Physical functioning: good  Mood: fair  Family and social relationships: fair  Sleep pattern: good  Overall functioning: fair  Nonnarcotic treatments that are being used: nothing right now, has tried topical OTC pain relievers    Pain management agreement initiated and signed on: January 2018  Last dose of narcotic medication: this morning  Most recent urine drug screen done October 2017, which was reviewed today and consistent. No longer on Xanax, weaned off.  Aberrant Behaviors: None  I have reviewed the medical records, the Prescription Monitoring Program and I have determined that percocet 10, 5 times daily, is medically indicated.    I have advised patient to keep medication in a safe place and to not drive with medication.

## 2019-03-01 NOTE — PROGRESS NOTES
Subjective:   Mumtaz Carney is a 68 y.o. male here today for lower back pain, shoulder pain, diabetes    Chronic right shoulder pain  Having recurrent right shoulder pain. Had an injection 6 months ago which was beneficial, requesting repeat injection today.    Patient is having difficulty combing his hair and with lateral shoulder raise.  X-ray from about 9 months ago shows mild osteoarthritis.    Chronic lower back pain  Chronic back pain. Patient has known wedge fracture in Thoracic spine, recently seen on x-ray.   Percocet 10 mg tablets, 5 times daily  Current pain control: good, 4/10  Adverse effects: none.  Physical functioning: good  Mood: fair  Family and social relationships: fair  Sleep pattern: good  Overall functioning: fair  Nonnarcotic treatments that are being used: nothing right now, has tried topical OTC pain relievers    Pain management agreement initiated and signed on: January 2018  Last dose of narcotic medication: this morning  Most recent urine drug screen done October 2017, which was reviewed today and consistent. No longer on Xanax, weaned off.  Aberrant Behaviors: None  I have reviewed the medical records, the Prescription Monitoring Program and I have determined that percocet 10, 5 times daily, is medically indicated.    I have advised patient to keep medication in a safe place and to not drive with medication.    Diabetes mellitus type 2, controlled  Did not get blood work done because of transportation problems.         Current medicines (including changes today)  Current Outpatient Prescriptions   Medication Sig Dispense Refill   • [START ON 4/26/2019] oxyCODONE-acetaminophen (PERCOCET-10)  MG Tab Take 1 Tab by mouth 5 Times a Day for 30 days. 150 Tab 0   • SYMBICORT 160-4.5 MCG/ACT Aerosol INHALE 2 PUFFS BY MOUTH TWICE DAILY; RINSE MOUTH AFTER EACH USE 10.2 g 11   • azithromycin (ZITHROMAX) 250 MG Tab Take 2 tablets on day 1, then take 1 tablet a day for 4 days. 6 Tab 0    • VENTOLIN  (90 Base) MCG/ACT Aero Soln inhalation aerosol INHALE 2 PUFFS BY MOUTH EVERY 6 HOURS AS NEEDED 18 g 2   • SPIRIVA HANDIHALER 18 MCG Cap INHALE 1 CAPSULE BY MOUTH VIA HANDIHALER EVERY DAY 90 Cap 3   • furosemide (LASIX) 20 MG Tab TAKE 1 TABLET BY MOUTH DAILY 90 Tab 3   • potassium chloride SA (KDUR) 20 MEQ Tab CR TAKE 1 TABLET BY MOUTH EVERY DAY 90 Tab 3   • lisinopril (PRINIVIL) 20 MG Tab Take 1 Tab by mouth every day. 90 Tab 3   • omeprazole (PRILOSEC) 20 MG delayed-release capsule TAKE 1 CAPSULE BY MOUTH TWICE DAILY 180 Cap 3   • buPROPion SR (WELLBUTRIN-SR) 150 MG TABLET SR 12 HR sustained-release tablet TAKE 1 TABLET BY MOUTH TWICE DAILY 180 Tab 3   • amLODIPine (NORVASC) 5 MG Tab TAKE 1 TABLET BY MOUTH EVERY DAY 90 Tab 3   • carvedilol (COREG) 3.125 MG Tab TAKE 1 TABLET BY MOUTH TWICE DAILY 180 Tab 3   • metformin (GLUCOPHAGE) 1000 MG tablet Take 1 Tab by mouth 2 times a day, with meals. For diabetes 180 Tab 3   • pravastatin (PRAVACHOL) 10 MG Tab TAKE 1 TABLET BY MOUTH EVERY DAY 90 Tab 3   • sertraline (ZOLOFT) 50 MG Tab TAKE 1 TABLET BY MOUTH EVERY DAY 90 Tab 3   • tamsulosin (FLOMAX) 0.4 MG capsule Take 2 Caps by mouth every day. 180 Cap 3   • aspirin (ASA) 325 MG Tab Take 325 mg by mouth every day.     • ondansetron (ZOFRAN ODT) 4 MG TABLET DISPERSIBLE Take 1 Tab by mouth every 8 hours as needed for Nausea/Vomiting. 10 Tab 0     No current facility-administered medications for this visit.      He  has a past medical history of Aortic stenosis; Breath shortness; Bronchitis; CHF (congestive heart failure) (HCC) (6/11/2010); Cirrhosis (HCC) (2014); COPD; COPD (chronic obstructive pulmonary disease) (HCC); HCV (hepatitis C virus) (1/16/2014); Heart burn; Hepatitis C (1/16/2014); Hypertension; Indigestion; MRSA (methicillin resistant Staphylococcus aureus); Other emphysema (HCC); Other specified disorder of intestines; Psychiatric problem; Severe aortic stenosis (12/17/2013); Tobacco abuse  "(6/11/2010); and Wound. He also has no past medical history of Angina; Arrhythmia; Arthritis; ASTHMA; Backpain; Cancer (HCC); CATARACT; Diabetes; Dialysis; Glaucoma; Heart murmur; Jaundice; Myocardial infarct (HCC); Other specified symptom associated with female genital organs; Pacemaker; Personal history of venous thrombosis and embolism; Pneumonia; Renal disorder; Rheumatic fever; Seizure (HCC); Stroke (HCC); Unspecified disorder of thyroid; Unspecified hemorrhagic conditions; or Unspecified urinary incontinence.    ROS   + back and shoulder pain, no fever, + shortness of breath       Objective:     Blood pressure (!) 182/78, pulse (!) 116, temperature 36 °C (96.8 °F), height 1.77 m (5' 9.69\"), weight 85.7 kg (189 lb), SpO2 88 %. Body mass index is 27.36 kg/m².   Physical Exam:  Constitutional: Alert, no distress.  Patient on supplemental oxygen.  Skin: Warm, dry, good turgor, no rashes in visible areas.  Eye: Equal, round and reactive, conjunctiva clear, lids normal.  Psych: Alert and oriented x3, normal affect and mood.  Right shoulder with limited range of motion to about 90 degrees with lateral raise.    PROCEDURE: Procedure was explained to patient with possible risks. Patient verbally agreed. The shoulder was prepped with iodine. Using a 23 guage needle the glenhumoral joint is injected with 1 cc 1% xylocaine and 1 cc of kenalog 40 under the posterior aspect of the acromion. The area is cleansed and dressed.        Assessment and Plan:   The following treatment plan was discussed    1. Chronic midline low back pain without sciatica  Stable.  Refill Percocet 10 mg 5 times daily.  Follow-up in 3 months for additional refills.  - Consent for Opiate Prescription  - oxyCODONE-acetaminophen (PERCOCET-10)  MG Tab; Take 1 Tab by mouth 5 Times a Day for 30 days.  Dispense: 150 Tab; Refill: 0    2. Chronic right shoulder pain  Uncontrolled.  Cortisone injection given today.    3. Controlled type 2 diabetes " mellitus with diabetic polyneuropathy, without long-term current use of insulin (HCC)  Advised patient to have labs done and we will call with results.      Followup: Return in about 3 months (around 6/1/2019) for Pain medication refill.

## 2019-03-04 RX ORDER — TAMSULOSIN HYDROCHLORIDE 0.4 MG/1
CAPSULE ORAL
Qty: 180 CAP | Refills: 3 | Status: SHIPPED | OUTPATIENT
Start: 2019-03-04

## 2019-05-02 ENCOUNTER — OFFICE VISIT (OUTPATIENT)
Dept: URGENT CARE | Facility: CLINIC | Age: 69
End: 2019-05-02
Payer: MEDICARE

## 2019-05-02 ENCOUNTER — HOSPITAL ENCOUNTER (OUTPATIENT)
Dept: LAB | Facility: MEDICAL CENTER | Age: 69
End: 2019-05-02
Attending: FAMILY MEDICINE
Payer: MEDICARE

## 2019-05-02 ENCOUNTER — HOSPITAL ENCOUNTER (OUTPATIENT)
Dept: LAB | Facility: MEDICAL CENTER | Age: 69
End: 2019-05-02
Attending: INTERNAL MEDICINE
Payer: MEDICARE

## 2019-05-02 VITALS
BODY MASS INDEX: 28.14 KG/M2 | WEIGHT: 190 LBS | HEIGHT: 69 IN | OXYGEN SATURATION: 91 % | DIASTOLIC BLOOD PRESSURE: 80 MMHG | SYSTOLIC BLOOD PRESSURE: 122 MMHG | RESPIRATION RATE: 14 BRPM | HEART RATE: 102 BPM | TEMPERATURE: 97.8 F

## 2019-05-02 DIAGNOSIS — E11.42 CONTROLLED TYPE 2 DIABETES MELLITUS WITH DIABETIC POLYNEUROPATHY, WITHOUT LONG-TERM CURRENT USE OF INSULIN (HCC): ICD-10-CM

## 2019-05-02 DIAGNOSIS — M25.511 CHRONIC RIGHT SHOULDER PAIN: ICD-10-CM

## 2019-05-02 DIAGNOSIS — G89.29 CHRONIC RIGHT SHOULDER PAIN: ICD-10-CM

## 2019-05-02 LAB
25(OH)D3 SERPL-MCNC: 14 NG/ML (ref 30–100)
ALBUMIN SERPL BCP-MCNC: 4.9 G/DL (ref 3.2–4.9)
ALBUMIN SERPL BCP-MCNC: 5 G/DL (ref 3.2–4.9)
ALBUMIN/GLOB SERPL: 1.5 G/DL
ALBUMIN/GLOB SERPL: 1.6 G/DL
ALP SERPL-CCNC: 72 U/L (ref 30–99)
ALP SERPL-CCNC: 76 U/L (ref 30–99)
ALT SERPL-CCNC: 24 U/L (ref 2–50)
ALT SERPL-CCNC: 25 U/L (ref 2–50)
ANION GAP SERPL CALC-SCNC: 11 MMOL/L (ref 0–11.9)
ANION GAP SERPL CALC-SCNC: 13 MMOL/L (ref 0–11.9)
AST SERPL-CCNC: 17 U/L (ref 12–45)
AST SERPL-CCNC: 18 U/L (ref 12–45)
BASOPHILS # BLD AUTO: 0.3 % (ref 0–1.8)
BASOPHILS # BLD AUTO: 0.4 % (ref 0–1.8)
BASOPHILS # BLD: 0.03 K/UL (ref 0–0.12)
BASOPHILS # BLD: 0.04 K/UL (ref 0–0.12)
BILIRUB SERPL-MCNC: 0.3 MG/DL (ref 0.1–1.5)
BILIRUB SERPL-MCNC: 0.3 MG/DL (ref 0.1–1.5)
BUN SERPL-MCNC: 22 MG/DL (ref 8–22)
BUN SERPL-MCNC: 23 MG/DL (ref 8–22)
CALCIUM SERPL-MCNC: 9.6 MG/DL (ref 8.5–10.5)
CALCIUM SERPL-MCNC: 9.8 MG/DL (ref 8.5–10.5)
CHLORIDE SERPL-SCNC: 94 MMOL/L (ref 96–112)
CHLORIDE SERPL-SCNC: 94 MMOL/L (ref 96–112)
CHOLEST SERPL-MCNC: 182 MG/DL (ref 100–199)
CO2 SERPL-SCNC: 34 MMOL/L (ref 20–33)
CO2 SERPL-SCNC: 35 MMOL/L (ref 20–33)
CREAT SERPL-MCNC: 0.78 MG/DL (ref 0.5–1.4)
CREAT SERPL-MCNC: 0.8 MG/DL (ref 0.5–1.4)
CREAT UR-MCNC: 240.6 MG/DL
EOSINOPHIL # BLD AUTO: 0.12 K/UL (ref 0–0.51)
EOSINOPHIL # BLD AUTO: 0.15 K/UL (ref 0–0.51)
EOSINOPHIL NFR BLD: 1.1 % (ref 0–6.9)
EOSINOPHIL NFR BLD: 1.3 % (ref 0–6.9)
ERYTHROCYTE [DISTWIDTH] IN BLOOD BY AUTOMATED COUNT: 43.5 FL (ref 35.9–50)
ERYTHROCYTE [DISTWIDTH] IN BLOOD BY AUTOMATED COUNT: 43.6 FL (ref 35.9–50)
EST. AVERAGE GLUCOSE BLD GHB EST-MCNC: 171 MG/DL
GLOBULIN SER CALC-MCNC: 3.2 G/DL (ref 1.9–3.5)
GLOBULIN SER CALC-MCNC: 3.3 G/DL (ref 1.9–3.5)
GLUCOSE SERPL-MCNC: 155 MG/DL (ref 65–99)
GLUCOSE SERPL-MCNC: 161 MG/DL (ref 65–99)
HBA1C MFR BLD: 7.6 % (ref 0–5.6)
HCT VFR BLD AUTO: 44.9 % (ref 42–52)
HCT VFR BLD AUTO: 45.5 % (ref 42–52)
HDLC SERPL-MCNC: 41 MG/DL
HGB BLD-MCNC: 14.2 G/DL (ref 14–18)
HGB BLD-MCNC: 14.3 G/DL (ref 14–18)
IMM GRANULOCYTES # BLD AUTO: 0.04 K/UL (ref 0–0.11)
IMM GRANULOCYTES # BLD AUTO: 0.05 K/UL (ref 0–0.11)
IMM GRANULOCYTES NFR BLD AUTO: 0.4 % (ref 0–0.9)
IMM GRANULOCYTES NFR BLD AUTO: 0.4 % (ref 0–0.9)
LDLC SERPL CALC-MCNC: 115 MG/DL
LYMPHOCYTES # BLD AUTO: 3.37 K/UL (ref 1–4.8)
LYMPHOCYTES # BLD AUTO: 3.86 K/UL (ref 1–4.8)
LYMPHOCYTES NFR BLD: 30.4 % (ref 22–41)
LYMPHOCYTES NFR BLD: 32.5 % (ref 22–41)
MCH RBC QN AUTO: 29.6 PG (ref 27–33)
MCH RBC QN AUTO: 30.1 PG (ref 27–33)
MCHC RBC AUTO-ENTMCNC: 31.2 G/DL (ref 33.7–35.3)
MCHC RBC AUTO-ENTMCNC: 31.8 G/DL (ref 33.7–35.3)
MCV RBC AUTO: 94.5 FL (ref 81.4–97.8)
MCV RBC AUTO: 94.8 FL (ref 81.4–97.8)
MICROALBUMIN UR-MCNC: 3.7 MG/DL
MICROALBUMIN/CREAT UR: 15 MG/G (ref 0–30)
MONOCYTES # BLD AUTO: 0.82 K/UL (ref 0–0.85)
MONOCYTES # BLD AUTO: 0.92 K/UL (ref 0–0.85)
MONOCYTES NFR BLD AUTO: 7.4 % (ref 0–13.4)
MONOCYTES NFR BLD AUTO: 7.7 % (ref 0–13.4)
NEUTROPHILS # BLD AUTO: 6.68 K/UL (ref 1.82–7.42)
NEUTROPHILS # BLD AUTO: 6.87 K/UL (ref 1.82–7.42)
NEUTROPHILS NFR BLD: 57.8 % (ref 44–72)
NEUTROPHILS NFR BLD: 60.3 % (ref 44–72)
NRBC # BLD AUTO: 0 K/UL
NRBC # BLD AUTO: 0 K/UL
NRBC BLD-RTO: 0 /100 WBC
NRBC BLD-RTO: 0 /100 WBC
PLATELET # BLD AUTO: 242 K/UL (ref 164–446)
PLATELET # BLD AUTO: 253 K/UL (ref 164–446)
PMV BLD AUTO: 10.8 FL (ref 9–12.9)
PMV BLD AUTO: 11.2 FL (ref 9–12.9)
POTASSIUM SERPL-SCNC: 4.2 MMOL/L (ref 3.6–5.5)
POTASSIUM SERPL-SCNC: 4.4 MMOL/L (ref 3.6–5.5)
PROT SERPL-MCNC: 8.2 G/DL (ref 6–8.2)
PROT SERPL-MCNC: 8.2 G/DL (ref 6–8.2)
RBC # BLD AUTO: 4.75 M/UL (ref 4.7–6.1)
RBC # BLD AUTO: 4.8 M/UL (ref 4.7–6.1)
SODIUM SERPL-SCNC: 140 MMOL/L (ref 135–145)
SODIUM SERPL-SCNC: 141 MMOL/L (ref 135–145)
TRIGL SERPL-MCNC: 131 MG/DL (ref 0–149)
WBC # BLD AUTO: 11.1 K/UL (ref 4.8–10.8)
WBC # BLD AUTO: 11.9 K/UL (ref 4.8–10.8)

## 2019-05-02 PROCEDURE — 82306 VITAMIN D 25 HYDROXY: CPT

## 2019-05-02 PROCEDURE — 80061 LIPID PANEL: CPT

## 2019-05-02 PROCEDURE — 82043 UR ALBUMIN QUANTITATIVE: CPT

## 2019-05-02 PROCEDURE — 82107 ALPHA-FETOPROTEIN L3: CPT

## 2019-05-02 PROCEDURE — 80053 COMPREHEN METABOLIC PANEL: CPT

## 2019-05-02 PROCEDURE — 36415 COLL VENOUS BLD VENIPUNCTURE: CPT

## 2019-05-02 PROCEDURE — 83036 HEMOGLOBIN GLYCOSYLATED A1C: CPT | Mod: GZ

## 2019-05-02 PROCEDURE — 80053 COMPREHEN METABOLIC PANEL: CPT | Mod: 91

## 2019-05-02 PROCEDURE — 82570 ASSAY OF URINE CREATININE: CPT

## 2019-05-02 PROCEDURE — 85025 COMPLETE CBC W/AUTO DIFF WBC: CPT | Mod: 91

## 2019-05-02 PROCEDURE — 99214 OFFICE O/P EST MOD 30 MIN: CPT | Mod: 25 | Performed by: PHYSICIAN ASSISTANT

## 2019-05-02 PROCEDURE — 85025 COMPLETE CBC W/AUTO DIFF WBC: CPT

## 2019-05-02 RX ORDER — KETOROLAC TROMETHAMINE 30 MG/ML
30 INJECTION, SOLUTION INTRAMUSCULAR; INTRAVENOUS ONCE
Status: COMPLETED | OUTPATIENT
Start: 2019-05-02 | End: 2019-05-02

## 2019-05-02 RX ADMIN — KETOROLAC TROMETHAMINE 30 MG: 30 INJECTION, SOLUTION INTRAMUSCULAR; INTRAVENOUS at 12:25

## 2019-05-02 ASSESSMENT — ENCOUNTER SYMPTOMS
NUMBNESS: 0
VOMITING: 0
FALLS: 0
NECK PAIN: 0
WEAKNESS: 0
FEVER: 0
RESPIRATORY NEGATIVE: 1
NEUROLOGICAL NEGATIVE: 1
CARDIOVASCULAR NEGATIVE: 1
JOINT SWELLING: 0
CONSTITUTIONAL NEGATIVE: 1
GASTROINTESTINAL NEGATIVE: 1
BACK PAIN: 0
CHILLS: 0

## 2019-05-02 ASSESSMENT — PAIN SCALES - GENERAL: PAINLEVEL: 8=MODERATE-SEVERE PAIN

## 2019-05-02 NOTE — PROGRESS NOTES
Subjective:      Mumtaz Carney is a 68 y.o. male who presents with Shoulder Pain (right shouder pain, done witht he medication so its hurting alot morex 3 weeks )            Chronic shoulder pain due to arthritis.  But over the last 3 weeks it is been hurting worse.  He is on opioid medication for chronic pain.  He has a follow-up with his PCP soon.      Shoulder Pain   This is a chronic problem. The current episode started more than 1 year ago. The problem occurs constantly. The problem has been unchanged. Pertinent negatives include no chest pain, chills, fever, joint swelling, neck pain, numbness, vomiting or weakness. The symptoms are aggravated by bending. He has tried oral narcotics for the symptoms. The treatment provided mild relief.       PMH:  has a past medical history of Aortic stenosis; Breath shortness; Bronchitis; CHF (congestive heart failure) ([facility]) (6/11/2010); Cirrhosis ([facility]) (2014); COPD; COPD (chronic obstructive pulmonary disease) ([facility]); HCV (hepatitis C virus) (1/16/2014); Heart burn; Hepatitis C (1/16/2014); Hypertension; Indigestion; MRSA (methicillin resistant Staphylococcus aureus); Other emphysema ([facility]); Other specified disorder of intestines; Psychiatric problem; Severe aortic stenosis (12/17/2013); Tobacco abuse (6/11/2010); and Wound. He also has no past medical history of Angina; Arrhythmia; Arthritis; ASTHMA; Backpain; Cancer (HCC); CATARACT; Diabetes; Dialysis; Glaucoma; Heart murmur; Jaundice; Myocardial infarct ([facility]); Other specified symptom associated with female genital organs; Pacemaker; Personal history of venous thrombosis and embolism; Pneumonia; Renal disorder; Rheumatic fever; Seizure (HCC); Stroke ([facility]); Unspecified disorder of thyroid; Unspecified hemorrhagic conditions; or Unspecified urinary incontinence.  MEDS:   Current Outpatient Prescriptions:   •  tamsulosin (FLOMAX) 0.4 MG capsule, TAKE 2 CAPSULES BY MOUTH EVERY DAY, Disp: 180 Cap, Rfl: 3  •   oxyCODONE-acetaminophen (PERCOCET-10)  MG Tab, Take 1 Tab by mouth 5 Times a Day for 30 days., Disp: 150 Tab, Rfl: 0  •  SYMBICORT 160-4.5 MCG/ACT Aerosol, INHALE 2 PUFFS BY MOUTH TWICE DAILY; RINSE MOUTH AFTER EACH USE, Disp: 10.2 g, Rfl: 11  •  azithromycin (ZITHROMAX) 250 MG Tab, Take 2 tablets on day 1, then take 1 tablet a day for 4 days., Disp: 6 Tab, Rfl: 0  •  VENTOLIN  (90 Base) MCG/ACT Aero Soln inhalation aerosol, INHALE 2 PUFFS BY MOUTH EVERY 6 HOURS AS NEEDED, Disp: 18 g, Rfl: 2  •  SPIRIVA HANDIHALER 18 MCG Cap, INHALE 1 CAPSULE BY MOUTH VIA HANDIHALER EVERY DAY, Disp: 90 Cap, Rfl: 3  •  furosemide (LASIX) 20 MG Tab, TAKE 1 TABLET BY MOUTH DAILY, Disp: 90 Tab, Rfl: 3  •  potassium chloride SA (KDUR) 20 MEQ Tab CR, TAKE 1 TABLET BY MOUTH EVERY DAY, Disp: 90 Tab, Rfl: 3  •  lisinopril (PRINIVIL) 20 MG Tab, Take 1 Tab by mouth every day., Disp: 90 Tab, Rfl: 3  •  omeprazole (PRILOSEC) 20 MG delayed-release capsule, TAKE 1 CAPSULE BY MOUTH TWICE DAILY, Disp: 180 Cap, Rfl: 3  •  buPROPion SR (WELLBUTRIN-SR) 150 MG TABLET SR 12 HR sustained-release tablet, TAKE 1 TABLET BY MOUTH TWICE DAILY, Disp: 180 Tab, Rfl: 3  •  amLODIPine (NORVASC) 5 MG Tab, TAKE 1 TABLET BY MOUTH EVERY DAY, Disp: 90 Tab, Rfl: 3  •  carvedilol (COREG) 3.125 MG Tab, TAKE 1 TABLET BY MOUTH TWICE DAILY, Disp: 180 Tab, Rfl: 3  •  metformin (GLUCOPHAGE) 1000 MG tablet, Take 1 Tab by mouth 2 times a day, with meals. For diabetes, Disp: 180 Tab, Rfl: 3  •  pravastatin (PRAVACHOL) 10 MG Tab, TAKE 1 TABLET BY MOUTH EVERY DAY, Disp: 90 Tab, Rfl: 3  •  sertraline (ZOLOFT) 50 MG Tab, TAKE 1 TABLET BY MOUTH EVERY DAY, Disp: 90 Tab, Rfl: 3  •  aspirin (ASA) 325 MG Tab, Take 325 mg by mouth every day., Disp: , Rfl:   •  ondansetron (ZOFRAN ODT) 4 MG TABLET DISPERSIBLE, Take 1 Tab by mouth every 8 hours as needed for Nausea/Vomiting., Disp: 10 Tab, Rfl: 0  ALLERGIES: No Known Allergies  SURGHX:   Past Surgical History:   Procedure  "Laterality Date   • RECOVERY  4/4/2014    Performed by [facility] at SURGERY SAME DAY [facility] ORS   • HERNIA REP INGUINAL     • OTHER      dental 2006   • OTHER ORTHOPEDIC SURGERY      right knee     SOCHX:  reports that he quit smoking about 16 months ago. His smoking use included Cigarettes. He smoked 2.00 packs per day. He has never used smokeless tobacco. He reports that he does not drink alcohol or use drugs.  FH: family history includes Cancer in his mother; No Known Problems in his father, maternal grandfather, maternal grandmother, paternal grandfather, and paternal grandmother.      Review of Systems   Constitutional: Negative.  Negative for chills and fever.   Respiratory: Negative.    Cardiovascular: Negative.  Negative for chest pain.   Gastrointestinal: Negative.  Negative for vomiting.   Genitourinary: Negative.    Musculoskeletal: Positive for joint pain. Negative for back pain, falls, joint swelling and neck pain.   Neurological: Negative.  Negative for weakness and numbness.       Medications, Allergies, and current problem list reviewed today in Epic     Objective:     /80   Pulse (!) 102   Temp 36.6 °C (97.8 °F) (Temporal)   Resp 14   Ht 1.754 m (5' 9.05\")   Wt 86.2 kg (190 lb)   SpO2 91%   BMI 28.02 kg/m²      Physical Exam   Constitutional: He is oriented to person, place, and time. He appears well-developed and well-nourished. No distress.   HENT:   Head: Normocephalic and atraumatic.   Eyes: Conjunctivae and EOM are normal.   Neck: Normal range of motion. Neck supple.   Cardiovascular: Normal rate, regular rhythm and normal heart sounds.    No murmur heard.  Pulmonary/Chest: Effort normal and breath sounds normal. No respiratory distress. He has no wheezes.   Musculoskeletal:        Right shoulder: He exhibits decreased range of motion, tenderness, deformity and pain. He exhibits no bony tenderness, no swelling, no effusion, no crepitus, no spasm, normal pulse and normal " strength.        Arms:  Neurological: He is alert and oriented to person, place, and time.   Skin: Skin is warm and dry. He is not diaphoretic.   Psychiatric: He has a normal mood and affect. His behavior is normal. Judgment and thought content normal.   Nursing note and vitals reviewed.              Assessment/Plan:     1. Chronic right shoulder pain  ketorolac (TORADOL) injection 30 mg     Generalized chronic pain.  No acute changes or concerning findings.  Toradol injection clinic.  Follow-up with PCP.  OTC meds and conservative measures as discussed    Return to clinic or go to ED if symptoms worsen or persist. Indications for ED discussed at length. Patient voices understanding. Follow-up with your primary care provider in 3-5 days. Red flags discussed. All side effects of medication discussed including allergic response, GI upset, tendon injury, etc.    Please note that this dictation was created using voice recognition software. I have made every reasonable attempt to correct obvious errors, but I expect that there are errors of grammar and possibly content that I did not discover before finalizing the note.

## 2019-05-06 LAB
AFP L3 MFR SERPL: <0.5 % (ref 0–9.9)
AFP SERPL-MCNC: 3 NG/ML (ref 0–15)

## 2019-05-31 ENCOUNTER — OFFICE VISIT (OUTPATIENT)
Dept: MEDICAL GROUP | Facility: MEDICAL CENTER | Age: 69
End: 2019-05-31
Payer: MEDICARE

## 2019-05-31 VITALS
WEIGHT: 190 LBS | HEART RATE: 99 BPM | OXYGEN SATURATION: 90 % | TEMPERATURE: 97.3 F | SYSTOLIC BLOOD PRESSURE: 136 MMHG | HEIGHT: 69 IN | DIASTOLIC BLOOD PRESSURE: 74 MMHG | BODY MASS INDEX: 28.14 KG/M2

## 2019-05-31 DIAGNOSIS — M25.511 CHRONIC RIGHT SHOULDER PAIN: ICD-10-CM

## 2019-05-31 DIAGNOSIS — G89.29 CHRONIC RIGHT SHOULDER PAIN: ICD-10-CM

## 2019-05-31 DIAGNOSIS — J44.9 CHRONIC OBSTRUCTIVE PULMONARY DISEASE, UNSPECIFIED COPD TYPE (HCC): Chronic | ICD-10-CM

## 2019-05-31 DIAGNOSIS — M54.50 CHRONIC MIDLINE LOW BACK PAIN WITHOUT SCIATICA: ICD-10-CM

## 2019-05-31 DIAGNOSIS — G89.29 CHRONIC MIDLINE LOW BACK PAIN WITHOUT SCIATICA: ICD-10-CM

## 2019-05-31 PROCEDURE — 20610 DRAIN/INJ JOINT/BURSA W/O US: CPT | Mod: RT | Performed by: FAMILY MEDICINE

## 2019-05-31 PROCEDURE — 99214 OFFICE O/P EST MOD 30 MIN: CPT | Mod: 25 | Performed by: FAMILY MEDICINE

## 2019-05-31 RX ORDER — OXYCODONE AND ACETAMINOPHEN 10; 325 MG/1; MG/1
1 TABLET ORAL
Qty: 150 TAB | Refills: 0 | Status: SHIPPED | OUTPATIENT
Start: 2019-06-28 | End: 2019-05-31 | Stop reason: SDUPTHER

## 2019-05-31 RX ORDER — OXYCODONE AND ACETAMINOPHEN 10; 325 MG/1; MG/1
1 TABLET ORAL
Qty: 150 TAB | Refills: 0 | Status: SHIPPED | OUTPATIENT
Start: 2019-07-26 | End: 2019-08-23 | Stop reason: SDUPTHER

## 2019-05-31 RX ORDER — OXYCODONE AND ACETAMINOPHEN 10; 325 MG/1; MG/1
1 TABLET ORAL
Qty: 150 TAB | Refills: 0 | Status: SHIPPED | OUTPATIENT
Start: 2019-05-31 | End: 2019-05-31 | Stop reason: SDUPTHER

## 2019-05-31 RX ORDER — LIDOCAINE HYDROCHLORIDE 20 MG/ML
1 INJECTION, SOLUTION EPIDURAL; INFILTRATION; INTRACAUDAL; PERINEURAL ONCE
Status: COMPLETED | OUTPATIENT
Start: 2019-05-31 | End: 2019-05-31

## 2019-05-31 RX ORDER — AZITHROMYCIN 250 MG/1
TABLET, FILM COATED ORAL
Qty: 6 TAB | Refills: 0 | Status: SHIPPED | OUTPATIENT
Start: 2019-05-31 | End: 2019-08-23

## 2019-05-31 RX ORDER — PREDNISONE 20 MG/1
40 TABLET ORAL DAILY
Qty: 10 TAB | Refills: 0 | Status: SHIPPED | OUTPATIENT
Start: 2019-05-31 | End: 2019-06-05

## 2019-05-31 RX ORDER — ALBUTEROL SULFATE 90 UG/1
2 AEROSOL, METERED RESPIRATORY (INHALATION) EVERY 6 HOURS PRN
Qty: 18 G | Refills: 2 | Status: SHIPPED | OUTPATIENT
Start: 2019-05-31 | End: 2019-08-25 | Stop reason: SDUPTHER

## 2019-05-31 RX ORDER — TRIAMCINOLONE ACETONIDE 40 MG/ML
40 INJECTION, SUSPENSION INTRA-ARTICULAR; INTRAMUSCULAR ONCE
Status: COMPLETED | OUTPATIENT
Start: 2019-05-31 | End: 2019-05-31

## 2019-05-31 RX ADMIN — LIDOCAINE HYDROCHLORIDE 1 ML: 20 INJECTION, SOLUTION EPIDURAL; INFILTRATION; INTRACAUDAL; PERINEURAL at 12:05

## 2019-05-31 RX ADMIN — TRIAMCINOLONE ACETONIDE 40 MG: 40 INJECTION, SUSPENSION INTRA-ARTICULAR; INTRAMUSCULAR at 12:05

## 2019-05-31 NOTE — ASSESSMENT & PLAN NOTE
Having recurrent right shoulder pain. Had an injection 3 months ago which was only mildly beneficial, requesting repeat injection today.    Patient is having difficulty combing his hair and with lateral shoulder raise.  X-ray from about 12 months ago shows mild osteoarthritis.

## 2019-05-31 NOTE — ASSESSMENT & PLAN NOTE
Patient has severe COPD and is on chronic supplemental oxygen.  He is requesting a refill for albuterol, prednisone and azithromycin.  He states that prednisone and azithromycin are very beneficial when he gets a flareup.

## 2019-05-31 NOTE — PROGRESS NOTES
Subjective:   Mumtaz Carney is a 68 y.o. male here today for back pain, shoulder pain, COPD    Chronic lower back pain  Chronic back pain. Patient has known wedge fracture in Thoracic spine, recently seen on x-ray.   Percocet 10 mg tablets, 5 times daily  Current pain control: good, 4/10  Adverse effects: none.  Physical functioning: good  Mood: fair  Family and social relationships: fair  Sleep pattern: good  Overall functioning: fair  Nonnarcotic treatments that are being used: nothing right now, has tried topical OTC pain relievers    Pain management agreement initiated and signed on: January 2018  Last dose of narcotic medication: this morning  Most recent urine drug screen done: November 2018, which was reviewed today and consistent. No longer on Xanax, weaned off.  Aberrant Behaviors: None  I have reviewed the medical records, the Prescription Monitoring Program and I have determined that percocet 10, 5 times daily, is medically indicated.    I have advised patient to keep medication in a safe place and to not drive with medication.    Chronic right shoulder pain  Having recurrent right shoulder pain. Had an injection 3 months ago which was only mildly beneficial, requesting repeat injection today.    Patient is having difficulty combing his hair and with lateral shoulder raise.  X-ray from about 12 months ago shows mild osteoarthritis.    COPD (chronic obstructive pulmonary disease)  Patient has severe COPD and is on chronic supplemental oxygen.  He is requesting a refill for albuterol, prednisone and azithromycin.  He states that prednisone and azithromycin are very beneficial when he gets a flareup.         Current medicines (including changes today)  Current Outpatient Prescriptions   Medication Sig Dispense Refill   • albuterol (VENTOLIN HFA) 108 (90 Base) MCG/ACT Aero Soln inhalation aerosol Inhale 2 Puffs by mouth every 6 hours as needed. 18 g 2   • azithromycin (ZITHROMAX) 250 MG Tab Take 2  tablets on day 1, then take 1 tablet a day for 4 days. 6 Tab 0   • predniSONE (DELTASONE) 20 MG Tab Take 2 Tabs by mouth every day for 5 days. 10 Tab 0   • [START ON 7/26/2019] oxyCODONE-acetaminophen (PERCOCET-10)  MG Tab Take 1 Tab by mouth 5 Times a Day for 30 days. 150 Tab 0   • tamsulosin (FLOMAX) 0.4 MG capsule TAKE 2 CAPSULES BY MOUTH EVERY  Cap 3   • SYMBICORT 160-4.5 MCG/ACT Aerosol INHALE 2 PUFFS BY MOUTH TWICE DAILY; RINSE MOUTH AFTER EACH USE 10.2 g 11   • SPIRIVA HANDIHALER 18 MCG Cap INHALE 1 CAPSULE BY MOUTH VIA HANDIHALER EVERY DAY 90 Cap 3   • furosemide (LASIX) 20 MG Tab TAKE 1 TABLET BY MOUTH DAILY 90 Tab 3   • potassium chloride SA (KDUR) 20 MEQ Tab CR TAKE 1 TABLET BY MOUTH EVERY DAY 90 Tab 3   • lisinopril (PRINIVIL) 20 MG Tab Take 1 Tab by mouth every day. 90 Tab 3   • omeprazole (PRILOSEC) 20 MG delayed-release capsule TAKE 1 CAPSULE BY MOUTH TWICE DAILY 180 Cap 3   • buPROPion SR (WELLBUTRIN-SR) 150 MG TABLET SR 12 HR sustained-release tablet TAKE 1 TABLET BY MOUTH TWICE DAILY 180 Tab 3   • amLODIPine (NORVASC) 5 MG Tab TAKE 1 TABLET BY MOUTH EVERY DAY 90 Tab 3   • carvedilol (COREG) 3.125 MG Tab TAKE 1 TABLET BY MOUTH TWICE DAILY 180 Tab 3   • metformin (GLUCOPHAGE) 1000 MG tablet Take 1 Tab by mouth 2 times a day, with meals. For diabetes 180 Tab 3   • pravastatin (PRAVACHOL) 10 MG Tab TAKE 1 TABLET BY MOUTH EVERY DAY 90 Tab 3   • sertraline (ZOLOFT) 50 MG Tab TAKE 1 TABLET BY MOUTH EVERY DAY 90 Tab 3   • aspirin (ASA) 325 MG Tab Take 325 mg by mouth every day.     • ondansetron (ZOFRAN ODT) 4 MG TABLET DISPERSIBLE Take 1 Tab by mouth every 8 hours as needed for Nausea/Vomiting. 10 Tab 0     Current Facility-Administered Medications   Medication Dose Route Frequency Provider Last Rate Last Dose   • triamcinolone acetonide (KENALOG-40) injection 40 mg  40 mg Intramuscular Once Marlo Reynolds M.D.       • lidocaine PF (XYLOCAINE-MPF) 2 % injection PF 1 mL  1 mL Injection Once  "Marlo Reynolds M.D.         He  has a past medical history of Aortic stenosis; Breath shortness; Bronchitis; CHF (congestive heart failure) (HCC) (6/11/2010); Cirrhosis (HCC) (2014); COPD; COPD (chronic obstructive pulmonary disease) (HCC); HCV (hepatitis C virus) (1/16/2014); Heart burn; Hepatitis C (1/16/2014); Hypertension; Indigestion; MRSA (methicillin resistant Staphylococcus aureus); Other emphysema (HCC); Other specified disorder of intestines; Psychiatric problem; Severe aortic stenosis (12/17/2013); Tobacco abuse (6/11/2010); and Wound. He also has no past medical history of Angina; Arrhythmia; Arthritis; ASTHMA; Backpain; Cancer (HCC); CATARACT; Diabetes; Dialysis; Glaucoma; Heart murmur; Jaundice; Myocardial infarct (HCC); Other specified symptom associated with female genital organs; Pacemaker; Personal history of venous thrombosis and embolism; Pneumonia; Renal disorder; Rheumatic fever; Seizure (HCC); Stroke (HCC); Unspecified disorder of thyroid; Unspecified hemorrhagic conditions; or Unspecified urinary incontinence.    ROS   No chest pain, no fever       Objective:     /74 (BP Location: Right arm, Patient Position: Sitting)   Pulse 99   Temp 36.3 °C (97.3 °F)   Ht 1.753 m (5' 9\")   Wt 86.2 kg (190 lb)   SpO2 90%  Body mass index is 28.06 kg/m².   Physical Exam:  Constitutional: Alert, no distress.  Skin: Warm, dry, good turgor, no rashes in visible areas.  Eye: Equal, round and reactive, conjunctiva clear, lids normal.  Psych: Alert and oriented x3, normal affect and mood.    PROCEDURE: Procedure was explained to patient with possible risks. Patient verbally agreed. The shoulder was prepped with alcohol. Using a 23 guage needle the glenhumoral joint is injected with 1 cc 2% xylocaine and 1 cc of kenalog 40 under the posterior aspect of the acromion. The area is cleansed and dressed.      Assessment and Plan:   The following treatment plan was discussed    1. Chronic midline low back pain " without sciatica  Patient complains of pain is worsening.  Offered pain management referral but he declines.  Refill Percocet at current dose.  Follow-up in 3 months.  - Consent for Opiate Prescription  - oxyCODONE-acetaminophen (PERCOCET-10)  MG Tab; Take 1 Tab by mouth 5 Times a Day for 30 days.  Dispense: 150 Tab; Refill: 0    2. Chronic right shoulder pain  Kenalog injection given today.  Advised patient if this does not help, we should probably discontinue doing Kenalog injections, which he understands.  However, he was very adamant of having the injection done today to try to offer some relief of pain.  - triamcinolone acetonide (KENALOG-40) injection 40 mg; 1 mL by Intramuscular route Once.  - lidocaine PF (XYLOCAINE-MPF) 2 % injection PF 1 mL; 1 mL by Injection route Once.    3. Chronic obstructive pulmonary disease, unspecified COPD type (HCC)  Stable.  Refill prednisone, azithromycin and albuterol.  - albuterol (VENTOLIN HFA) 108 (90 Base) MCG/ACT Aero Soln inhalation aerosol; Inhale 2 Puffs by mouth every 6 hours as needed.  Dispense: 18 g; Refill: 2  - azithromycin (ZITHROMAX) 250 MG Tab; Take 2 tablets on day 1, then take 1 tablet a day for 4 days.  Dispense: 6 Tab; Refill: 0  - predniSONE (DELTASONE) 20 MG Tab; Take 2 Tabs by mouth every day for 5 days.  Dispense: 10 Tab; Refill: 0      Followup: Return in about 3 months (around 8/31/2019), or if symptoms worsen or fail to improve, for Pain medication refill.

## 2019-06-03 ENCOUNTER — TELEPHONE (OUTPATIENT)
Dept: MEDICAL GROUP | Facility: MEDICAL CENTER | Age: 69
End: 2019-06-03

## 2019-06-03 RX ORDER — PRAVASTATIN SODIUM 10 MG
TABLET ORAL
Qty: 90 TAB | Refills: 3 | Status: SHIPPED | OUTPATIENT
Start: 2019-06-03

## 2019-06-03 NOTE — TELEPHONE ENCOUNTER
Please find out what glucometer brand and type patient has a can send in the appropriate strips for that machine.  Marlo Reynolds M.D.

## 2019-06-03 NOTE — TELEPHONE ENCOUNTER
[facility] Please advise.

## 2019-07-30 RX ORDER — CARVEDILOL 3.12 MG/1
TABLET ORAL
Qty: 180 TAB | Refills: 3 | Status: SHIPPED | OUTPATIENT
Start: 2019-07-30

## 2019-07-30 RX ORDER — OMEPRAZOLE 20 MG/1
CAPSULE, DELAYED RELEASE ORAL
Qty: 180 CAP | Refills: 3 | Status: SHIPPED | OUTPATIENT
Start: 2019-07-30

## 2019-08-02 NOTE — ASSESSMENT & PLAN NOTE
Chronic back pain. Patient has known wedge fracture in Thoracic spine, recently seen on x-ray.  Patient states that Percocet 10 mg tablets, 4 times daily, are not effective at controlling his pain.  Current pain control: good, 4/10  Adverse effects: none.  Physical functioning: good  Mood: fair  Family and social relationships: fair  Sleep pattern: good  Overall functioning: fair  Nonnarcotic treatments that are being used: nothing right now, has tried topical OTC pain relievers    Pain management agreement initiated and signed on: January 2018  Last dose of narcotic medication: this morning  Most recent urine drug screen done October 2017, which was reviewed today and consistent. No longer on Xanax, weaned off.  Aberrant Behaviors: None  I have reviewed the medical records, the Prescription Monitoring Program and I have determined that percocet 10, 4 times daily, is medically indicated.    I have advised patient to keep medication in a safe place and to not drive with medication.   Location Indication Override (Is Already Calculated Based On Selected Body Location): Area M

## 2019-08-08 ENCOUNTER — OFFICE VISIT (OUTPATIENT)
Dept: PULMONOLOGY | Facility: HOSPICE | Age: 69
End: 2019-08-08
Payer: MEDICARE

## 2019-08-08 VITALS
BODY MASS INDEX: 24.77 KG/M2 | DIASTOLIC BLOOD PRESSURE: 82 MMHG | OXYGEN SATURATION: 95 % | HEART RATE: 89 BPM | RESPIRATION RATE: 16 BRPM | WEIGHT: 173 LBS | HEIGHT: 70 IN | SYSTOLIC BLOOD PRESSURE: 152 MMHG

## 2019-08-08 DIAGNOSIS — Z72.0 TOBACCO USE: ICD-10-CM

## 2019-08-08 DIAGNOSIS — J44.9 CHRONIC OBSTRUCTIVE PULMONARY DISEASE, UNSPECIFIED COPD TYPE (HCC): Chronic | ICD-10-CM

## 2019-08-08 DIAGNOSIS — J96.11 CHRONIC RESPIRATORY FAILURE WITH HYPOXIA (HCC): ICD-10-CM

## 2019-08-08 PROCEDURE — 99214 OFFICE O/P EST MOD 30 MIN: CPT | Performed by: INTERNAL MEDICINE

## 2019-08-08 NOTE — PROGRESS NOTES
Chief Complaint   Patient presents with   • Follow-Up     COPD with exacerbation (CMS-HCC       HPI: This patient is a 68 y.o. Male who returns after a prolonged hiatus for COPD.  His last visit was in 2017 with Dr. Garcia.  At that time pulmonary function testing showed FEV1 of 0.72 L or 21% predicted consistent with stage IV COPD.  He is O2 dependent and compliant with Symbicort 160 mcg and Spiriva inhalers.  He requires his LEONORA daily.  His oxygen concentrator recently quit and he had to call 911 for respiratory distress.  He had quit smoking and noted significant improvement in his respiratory symptoms however resumed smoking over the past year.  He is now back on nicotine patch.  He denies cough, wheezing, chest tightness or edema.  Chest CAT scan from 2017 showed emphysema with a possible vascular formation in the right upper and middle lobe.      Past Medical History:   Diagnosis Date   • Aortic stenosis    • Breath shortness    • Bronchitis    • CHF (congestive heart failure) ([facility]) 6/11/2010   • Cirrhosis ([facility]) 2014   • COPD    • COPD (chronic obstructive pulmonary disease) ([facility])    • HCV (hepatitis C virus) 1/16/2014   • Heart burn    • Hepatitis C 1/16/2014   • Hypertension    • Indigestion    • MRSA (methicillin resistant Staphylococcus aureus)    • Other emphysema ([facility])    • Other specified disorder of intestines    • Psychiatric problem    • Severe aortic stenosis 12/17/2013   • Tobacco abuse 6/11/2010   • Wound     left shoulder       Social History     Socioeconomic History   • Marital status: Single     Spouse name: Not on file   • Number of children: Not on file   • Years of education: Not on file   • Highest education level: Not on file   Occupational History   • Not on file   Social Needs   • Financial resource strain: Not on file   • Food insecurity:     Worry: Not on file     Inability: Not on file   • Transportation needs:     Medical: Not on file     Non-medical: Not on file   Tobacco Use   •  "Smoking status: Former Smoker     Packs/day: 2.00     Types: Cigarettes     Last attempt to quit: 2017     Years since quittin.6   • Smokeless tobacco: Never Used   • Tobacco comment: ppd  1 pack per day/ started smoking at age 17   Substance and Sexual Activity   • Alcohol use: No     Alcohol/week: 0.0 oz     Comment: quit \" 8 years ago\"   • Drug use: No     Comment: stopped heroin 3 years ago, stopped methadone a year ago   • Sexual activity: Not Currently     Partners: Female   Lifestyle   • Physical activity:     Days per week: Not on file     Minutes per session: Not on file   • Stress: Not on file   Relationships   • Social connections:     Talks on phone: Not on file     Gets together: Not on file     Attends Jain service: Not on file     Active member of club or organization: Not on file     Attends meetings of clubs or organizations: Not on file     Relationship status: Not on file   • Intimate partner violence:     Fear of current or ex partner: Not on file     Emotionally abused: Not on file     Physically abused: Not on file     Forced sexual activity: Not on file   Other Topics Concern   • Not on file   Social History Narrative    ** Merged History Encounter **            Family History   Problem Relation Age of Onset   • Cancer Mother    • No Known Problems Father    • No Known Problems Maternal Grandmother    • No Known Problems Maternal Grandfather    • No Known Problems Paternal Grandmother    • No Known Problems Paternal Grandfather        Current Outpatient Medications on File Prior to Visit   Medication Sig Dispense Refill   • omeprazole (PRILOSEC) 20 MG delayed-release capsule TAKE 1 CAPSULE BY MOUTH TWICE DAILY 180 Cap 3   • carvedilol (COREG) 3.125 MG Tab TAKE 1 TABLET BY MOUTH TWICE DAILY 180 Tab 3   • metformin (GLUCOPHAGE) 1000 MG tablet TAKE 1 TABLET BY MOUTH TWICE DAILY WITH MEALS FOR DIABETES 180 Tab 3   • sertraline (ZOLOFT) 50 MG Tab TAKE 1 TABLET BY MOUTH EVERY DAY 90 " Tab 3   • pravastatin (PRAVACHOL) 10 MG Tab TAKE 1 TABLET BY MOUTH EVERY DAY 90 Tab 3   • albuterol (VENTOLIN HFA) 108 (90 Base) MCG/ACT Aero Soln inhalation aerosol Inhale 2 Puffs by mouth every 6 hours as needed. 18 g 2   • azithromycin (ZITHROMAX) 250 MG Tab Take 2 tablets on day 1, then take 1 tablet a day for 4 days. 6 Tab 0   • oxyCODONE-acetaminophen (PERCOCET-10)  MG Tab Take 1 Tab by mouth 5 Times a Day for 30 days. 150 Tab 0   • tamsulosin (FLOMAX) 0.4 MG capsule TAKE 2 CAPSULES BY MOUTH EVERY  Cap 3   • SYMBICORT 160-4.5 MCG/ACT Aerosol INHALE 2 PUFFS BY MOUTH TWICE DAILY; RINSE MOUTH AFTER EACH USE 10.2 g 11   • SPIRIVA HANDIHALER 18 MCG Cap INHALE 1 CAPSULE BY MOUTH VIA HANDIHALER EVERY DAY 90 Cap 3   • furosemide (LASIX) 20 MG Tab TAKE 1 TABLET BY MOUTH DAILY 90 Tab 3   • potassium chloride SA (KDUR) 20 MEQ Tab CR TAKE 1 TABLET BY MOUTH EVERY DAY 90 Tab 3   • lisinopril (PRINIVIL) 20 MG Tab Take 1 Tab by mouth every day. 90 Tab 3   • amLODIPine (NORVASC) 5 MG Tab TAKE 1 TABLET BY MOUTH EVERY DAY 90 Tab 3   • buPROPion SR (WELLBUTRIN-SR) 150 MG TABLET SR 12 HR sustained-release tablet TAKE 1 TABLET BY MOUTH TWICE DAILY 180 Tab 3   • aspirin (ASA) 325 MG Tab Take 325 mg by mouth every day.     • ondansetron (ZOFRAN ODT) 4 MG TABLET DISPERSIBLE Take 1 Tab by mouth every 8 hours as needed for Nausea/Vomiting. (Patient not taking: Reported on 8/8/2019) 10 Tab 0     No current facility-administered medications on file prior to visit.        Allergies: Patient has no known allergies.    ROS:   Constitutional: Denies fevers, chills, night sweats, fatigue or weight loss  Eyes: Denies vision loss, pain, drainage, double vision  Ears, Nose, Throat: Denies earache, difficulty hearing, tinnitus, nasal congestion, hoarseness  Cardiovascular: Denies chest pain, tightness, palpitations, orthopnea or edema  Respiratory: As in HPI  Sleep: Denies daytime sleepiness, snoring, apneas, insomnia, morning  "headaches  GI: Denies heartburn, dysphagia, nausea, abdominal pain, diarrhea or constipation  : Denies frequent urination, hematuria, discharge or painful urination  Musculoskeletal: +back pain, painful joints, sore muscles  Neurological: Denies weakness or headaches  Skin: No rashes    /82 (BP Location: Left arm, Patient Position: Sitting, BP Cuff Size: Adult)   Pulse 89   Resp 16   Ht 1.778 m (5' 10\")   Wt 78.5 kg (173 lb)   SpO2 95%     Physical Exam:  Appearance: Well-nourished, well-developed, in no acute distress  HEENT: Normocephalic, atraumatic, white sclera, PERRLA, oropharynx clear  Neck: No adenopathy or masses  Respiratory: no intercostal retractions or accessory muscle use  Lungs auscultation: Clear to auscultation bilaterally, diminished breath sounds  Cardiovascular: Regular rate rhythm. No murmurs, rubs or gallops.  No LE edema  Abdomen: soft, nondistended  Gait: In motorized wheelchair  Digits: No clubbing, cyanosis  Motor: No tremors  Orientation: Oriented to time, person and place    Diagnosis:  1. Chronic respiratory failure with hypoxia (HCC)     2. Chronic obstructive pulmonary disease, unspecified COPD type (HCC)  PULMONARY FUNCTION TESTS -Test requested: Complete Pulmonary Function Test; Include MIPS/MEPS? No    CT-CHEST (THORAX) W/O   3. Tobacco use         Plan:  The patient has very severe COPD, has resumed smoking with worsening respiratory insufficiency.  Smoking cessation counseling provided-he is on nicotine patch and motivated to quit.  Update chest CAT scan and pulmonary function testing with DLCO.  Obtain nebulizer for albuterol 0.083% every 4 hours as needed.  Continue Symbicort 160 mcg and Spiriva inhalers.    Continue oxygen at 4 L/min 24/7.  We discussed pulmonary rehabilitation, which he declined at this time.  Return in about 4 weeks (around 9/5/2019).      "

## 2019-08-23 ENCOUNTER — OFFICE VISIT (OUTPATIENT)
Dept: MEDICAL GROUP | Facility: MEDICAL CENTER | Age: 69
End: 2019-08-23
Payer: MEDICARE

## 2019-08-23 VITALS
DIASTOLIC BLOOD PRESSURE: 68 MMHG | BODY MASS INDEX: 25.62 KG/M2 | TEMPERATURE: 97.9 F | HEART RATE: 89 BPM | SYSTOLIC BLOOD PRESSURE: 134 MMHG | HEIGHT: 69 IN | OXYGEN SATURATION: 88 % | WEIGHT: 173 LBS

## 2019-08-23 DIAGNOSIS — E11.42 CONTROLLED TYPE 2 DIABETES MELLITUS WITH DIABETIC POLYNEUROPATHY, WITHOUT LONG-TERM CURRENT USE OF INSULIN (HCC): ICD-10-CM

## 2019-08-23 DIAGNOSIS — G89.29 CHRONIC MIDLINE LOW BACK PAIN WITHOUT SCIATICA: ICD-10-CM

## 2019-08-23 DIAGNOSIS — M54.50 CHRONIC MIDLINE LOW BACK PAIN WITHOUT SCIATICA: ICD-10-CM

## 2019-08-23 DIAGNOSIS — J44.9 CHRONIC OBSTRUCTIVE PULMONARY DISEASE, UNSPECIFIED COPD TYPE (HCC): Chronic | ICD-10-CM

## 2019-08-23 PROCEDURE — 99214 OFFICE O/P EST MOD 30 MIN: CPT | Performed by: FAMILY MEDICINE

## 2019-08-23 RX ORDER — OXYCODONE AND ACETAMINOPHEN 10; 325 MG/1; MG/1
1 TABLET ORAL
Qty: 150 TAB | Refills: 0 | Status: SHIPPED | OUTPATIENT
Start: 2019-08-23 | End: 2019-08-23 | Stop reason: SDUPTHER

## 2019-08-23 RX ORDER — OXYCODONE AND ACETAMINOPHEN 10; 325 MG/1; MG/1
1 TABLET ORAL
Qty: 150 TAB | Refills: 0 | Status: SHIPPED | OUTPATIENT
Start: 2019-09-20 | End: 2019-08-23 | Stop reason: SDUPTHER

## 2019-08-23 RX ORDER — AZITHROMYCIN 250 MG/1
TABLET, FILM COATED ORAL
Qty: 6 TAB | Refills: 0 | Status: SHIPPED | OUTPATIENT
Start: 2019-08-23 | End: 2019-08-30 | Stop reason: SDUPTHER

## 2019-08-23 RX ORDER — OXYCODONE AND ACETAMINOPHEN 10; 325 MG/1; MG/1
1 TABLET ORAL
Qty: 150 TAB | Refills: 0 | Status: SHIPPED | OUTPATIENT
Start: 2019-10-18 | End: 2019-11-08 | Stop reason: SDUPTHER

## 2019-08-23 RX ORDER — PREDNISONE 20 MG/1
40 TABLET ORAL DAILY
Qty: 10 TAB | Refills: 0 | Status: SHIPPED | OUTPATIENT
Start: 2019-08-23 | End: 2019-08-30 | Stop reason: SDUPTHER

## 2019-08-23 NOTE — PROGRESS NOTES
Subjective:   Mumtaz Carney is a 68 y.o. male here today for lower back pain    COPD (chronic obstructive pulmonary disease)  He is being followed by pulmonology and has an appointment and CT scan scheduled in the next few weeks. He has nebulizer at home.  Quit smoking about 1 week ago.  Requesting refill of steroid and z-dionne.    Chronic midline low back pain without sciatica  Chronic back pain. Patient has known wedge fracture in Thoracic spine, recently seen on x-ray.   Percocet 10 mg tablets, 5 times daily  Current pain control: good, 4/10  Adverse effects: none.  Physical functioning: good  Mood: fair  Family and social relationships: fair  Sleep pattern: good  Overall functioning: fair  Nonnarcotic treatments that are being used: nothing right now, has tried topical OTC pain relievers    Pain management agreement initiated and signed on: May 2019  Last dose of narcotic medication: this morning  Most recent urine drug screen done: November 2018, which was reviewed today and consistent. No longer on Xanax, weaned off.  Aberrant Behaviors: None  I have reviewed the medical records, the Prescription Monitoring Program and I have determined that percocet 10, 5 times daily, is medically indicated.    I have advised patient to keep medication in a safe place and to not drive with medication.    Diabetes mellitus type 2, controlled  Last A1c was 7.6 in May.         Current medicines (including changes today)  Current Outpatient Medications   Medication Sig Dispense Refill   • [START ON 10/18/2019] oxyCODONE-acetaminophen (PERCOCET-10)  MG Tab Take 1 Tab by mouth 5 Times a Day for 30 days. 150 Tab 0   • predniSONE (DELTASONE) 20 MG Tab Take 2 Tabs by mouth every day for 5 days. 10 Tab 0   • azithromycin (ZITHROMAX) 250 MG Tab 2 tabs by mouth day 1, 1 tab by mouth days 2-5 6 Tab 0   • omeprazole (PRILOSEC) 20 MG delayed-release capsule TAKE 1 CAPSULE BY MOUTH TWICE DAILY 180 Cap 3   • carvedilol (COREG)  3.125 MG Tab TAKE 1 TABLET BY MOUTH TWICE DAILY 180 Tab 3   • metformin (GLUCOPHAGE) 1000 MG tablet TAKE 1 TABLET BY MOUTH TWICE DAILY WITH MEALS FOR DIABETES 180 Tab 3   • sertraline (ZOLOFT) 50 MG Tab TAKE 1 TABLET BY MOUTH EVERY DAY 90 Tab 3   • pravastatin (PRAVACHOL) 10 MG Tab TAKE 1 TABLET BY MOUTH EVERY DAY 90 Tab 3   • albuterol (VENTOLIN HFA) 108 (90 Base) MCG/ACT Aero Soln inhalation aerosol Inhale 2 Puffs by mouth every 6 hours as needed. 18 g 2   • tamsulosin (FLOMAX) 0.4 MG capsule TAKE 2 CAPSULES BY MOUTH EVERY  Cap 3   • SYMBICORT 160-4.5 MCG/ACT Aerosol INHALE 2 PUFFS BY MOUTH TWICE DAILY; RINSE MOUTH AFTER EACH USE 10.2 g 11   • SPIRIVA HANDIHALER 18 MCG Cap INHALE 1 CAPSULE BY MOUTH VIA HANDIHALER EVERY DAY 90 Cap 3   • furosemide (LASIX) 20 MG Tab TAKE 1 TABLET BY MOUTH DAILY 90 Tab 3   • potassium chloride SA (KDUR) 20 MEQ Tab CR TAKE 1 TABLET BY MOUTH EVERY DAY 90 Tab 3   • lisinopril (PRINIVIL) 20 MG Tab Take 1 Tab by mouth every day. 90 Tab 3   • buPROPion SR (WELLBUTRIN-SR) 150 MG TABLET SR 12 HR sustained-release tablet TAKE 1 TABLET BY MOUTH TWICE DAILY 180 Tab 3   • amLODIPine (NORVASC) 5 MG Tab TAKE 1 TABLET BY MOUTH EVERY DAY 90 Tab 3   • aspirin (ASA) 325 MG Tab Take 325 mg by mouth every day.     • ondansetron (ZOFRAN ODT) 4 MG TABLET DISPERSIBLE Take 1 Tab by mouth every 8 hours as needed for Nausea/Vomiting. (Patient not taking: Reported on 8/8/2019) 10 Tab 0     No current facility-administered medications for this visit.      He  has a past medical history of Aortic stenosis, Breath shortness, Bronchitis, CHF (congestive heart failure) (HCC) (6/11/2010), Cirrhosis (HCC) (2014), COPD, COPD (chronic obstructive pulmonary disease) (HCC), HCV (hepatitis C virus) (1/16/2014), Heart burn, Hepatitis C (1/16/2014), Hypertension, Indigestion, MRSA (methicillin resistant Staphylococcus aureus), Other emphysema (HCC), Other specified disorder of intestines, Psychiatric problem, Severe  "aortic stenosis (12/17/2013), Tobacco abuse (6/11/2010), and Wound. He also has no past medical history of Angina, Arrhythmia, Arthritis, ASTHMA, Backpain, Cancer (HCC), CATARACT, Diabetes, Dialysis, Glaucoma, Heart murmur, Jaundice, Myocardial infarct (HCC), Other specified symptom associated with female genital organs, Pacemaker, Personal history of venous thrombosis and embolism, Pneumonia, Renal disorder, Rheumatic fever, Seizure (HCC), Stroke ([facility]), Unspecified disorder of thyroid, Unspecified hemorrhagic conditions, or Unspecified urinary incontinence.    ROS   + cough, no fever       Objective:     /68 (BP Location: Right arm, Patient Position: Sitting)   Pulse 89   Temp 36.6 °C (97.9 °F)   Ht 1.753 m (5' 9\")   Wt 78.5 kg (173 lb)   SpO2 88%  Body mass index is 25.55 kg/m².   Physical Exam:  Constitutional: Alert, no distress.  Skin: Warm, dry, good turgor, no rashes in visible areas.  Eye: Equal, round and reactive, conjunctiva clear, lids normal.  Psych: Alert and oriented x3, normal affect and mood.        Assessment and Plan:   The following treatment plan was discussed    1. Chronic midline low back pain without sciatica  Controlled. Continue Percocet for 3 months at current dose.  - oxyCODONE-acetaminophen (PERCOCET-10)  MG Tab; Take 1 Tab by mouth 5 Times a Day for 30 days.  Dispense: 150 Tab; Refill: 0    2. Chronic obstructive pulmonary disease, unspecified COPD type ([facility])  Stable. Continue following up with pulmonology. Refill of prednisone and z-dionne to have at home.  - predniSONE (DELTASONE) 20 MG Tab; Take 2 Tabs by mouth every day for 5 days.  Dispense: 10 Tab; Refill: 0  - azithromycin (ZITHROMAX) 250 MG Tab; 2 tabs by mouth day 1, 1 tab by mouth days 2-5  Dispense: 6 Tab; Refill: 0    3. Controlled type 2 diabetes mellitus with diabetic polyneuropathy, without long-term current use of insulin (HCC)  Continue metformin.  Follow-up with labs in 3 months.  - CBC WITH DIFFERENTIAL; " Future  - Comp Metabolic Panel; Future  - Lipid Profile; Future  - HEMOGLOBIN A1C; Future  - MICROALBUMIN CREAT RATIO URINE; Future  - TSH WITH REFLEX TO FT4; Future      Followup: Return in about 3 months (around 11/23/2019) for Pain medication refill, Diabetes.

## 2019-08-23 NOTE — ASSESSMENT & PLAN NOTE
He is being followed by pulmonology and has an appointment and CT scan scheduled in the next few weeks. He has nebulizer at home.  Quit smoking about 1 week ago.  Requesting refill of steroid and z-dionne.

## 2019-08-23 NOTE — ASSESSMENT & PLAN NOTE
Chronic back pain. Patient has known wedge fracture in Thoracic spine, recently seen on x-ray.   Percocet 10 mg tablets, 5 times daily  Current pain control: good, 4/10  Adverse effects: none.  Physical functioning: good  Mood: fair  Family and social relationships: fair  Sleep pattern: good  Overall functioning: fair  Nonnarcotic treatments that are being used: nothing right now, has tried topical OTC pain relievers    Pain management agreement initiated and signed on: May 2019  Last dose of narcotic medication: this morning  Most recent urine drug screen done: November 2018, which was reviewed today and consistent. No longer on Xanax, weaned off.  Aberrant Behaviors: None  I have reviewed the medical records, the Prescription Monitoring Program and I have determined that percocet 10, 5 times daily, is medically indicated.    I have advised patient to keep medication in a safe place and to not drive with medication.

## 2019-08-27 ENCOUNTER — HOSPITAL ENCOUNTER (OUTPATIENT)
Dept: RADIOLOGY | Facility: MEDICAL CENTER | Age: 69
End: 2019-08-27
Attending: INTERNAL MEDICINE
Payer: MEDICARE

## 2019-08-27 DIAGNOSIS — J44.9 CHRONIC OBSTRUCTIVE PULMONARY DISEASE, UNSPECIFIED COPD TYPE (HCC): ICD-10-CM

## 2019-08-27 PROCEDURE — 71250 CT THORAX DX C-: CPT

## 2019-08-30 ENCOUNTER — TELEPHONE (OUTPATIENT)
Dept: MEDICAL GROUP | Facility: MEDICAL CENTER | Age: 69
End: 2019-08-30

## 2019-08-30 DIAGNOSIS — E11.42 CONTROLLED TYPE 2 DIABETES MELLITUS WITH DIABETIC POLYNEUROPATHY, WITHOUT LONG-TERM CURRENT USE OF INSULIN (HCC): ICD-10-CM

## 2019-08-30 DIAGNOSIS — J44.9 CHRONIC OBSTRUCTIVE PULMONARY DISEASE, UNSPECIFIED COPD TYPE (HCC): Chronic | ICD-10-CM

## 2019-08-30 RX ORDER — PREDNISONE 20 MG/1
40 TABLET ORAL DAILY
Qty: 10 TAB | Refills: 0 | Status: SHIPPED | OUTPATIENT
Start: 2019-08-30 | End: 2019-09-04

## 2019-08-30 RX ORDER — AZITHROMYCIN 250 MG/1
TABLET, FILM COATED ORAL
Qty: 6 TAB | Refills: 0 | Status: SHIPPED | OUTPATIENT
Start: 2019-08-30 | End: 2019-09-04

## 2019-08-30 NOTE — TELEPHONE ENCOUNTER
Prescription sent in for new test strips.  Refill sent in for prednisone and azithromycin.  Marlo Reynolds M.D.

## 2019-08-30 NOTE — TELEPHONE ENCOUNTER
Patient requesting refills for test strips for One Touch Ultra Mini. Patient also requesting onthly refills for prednisone and azithromycin which help with his COPD. Please advise.

## 2019-08-31 RX ORDER — AMLODIPINE BESYLATE 5 MG/1
TABLET ORAL
Qty: 90 TAB | Refills: 3 | Status: SHIPPED | OUTPATIENT
Start: 2019-08-31

## 2019-09-10 ENCOUNTER — NON-PROVIDER VISIT (OUTPATIENT)
Dept: PULMONOLOGY | Facility: HOSPICE | Age: 69
End: 2019-09-10
Attending: INTERNAL MEDICINE
Payer: MEDICARE

## 2019-09-10 ENCOUNTER — OFFICE VISIT (OUTPATIENT)
Dept: PULMONOLOGY | Facility: HOSPICE | Age: 69
End: 2019-09-10
Payer: MEDICARE

## 2019-09-10 VITALS
OXYGEN SATURATION: 91 % | WEIGHT: 176 LBS | BODY MASS INDEX: 25.2 KG/M2 | RESPIRATION RATE: 14 BRPM | SYSTOLIC BLOOD PRESSURE: 120 MMHG | DIASTOLIC BLOOD PRESSURE: 60 MMHG | HEIGHT: 70 IN | HEART RATE: 87 BPM

## 2019-09-10 VITALS — BODY MASS INDEX: 25.99 KG/M2 | WEIGHT: 176 LBS

## 2019-09-10 DIAGNOSIS — Z72.0 TOBACCO ABUSE: Chronic | ICD-10-CM

## 2019-09-10 DIAGNOSIS — J44.9 CHRONIC OBSTRUCTIVE PULMONARY DISEASE, UNSPECIFIED COPD TYPE (HCC): Chronic | ICD-10-CM

## 2019-09-10 DIAGNOSIS — R09.02 HYPOXIA: ICD-10-CM

## 2019-09-10 PROCEDURE — 99214 OFFICE O/P EST MOD 30 MIN: CPT | Performed by: PHYSICIAN ASSISTANT

## 2019-09-10 PROCEDURE — 94729 DIFFUSING CAPACITY: CPT | Performed by: INTERNAL MEDICINE

## 2019-09-10 PROCEDURE — 94060 EVALUATION OF WHEEZING: CPT | Performed by: INTERNAL MEDICINE

## 2019-09-10 PROCEDURE — 94726 PLETHYSMOGRAPHY LUNG VOLUMES: CPT | Performed by: INTERNAL MEDICINE

## 2019-09-10 ASSESSMENT — PULMONARY FUNCTION TESTS
FVC_LLN: 3.70
FVC: 2.78
FEV1_PERCENT_PREDICTED: 21
FEV1: .73
FEV1/FVC_PERCENT_PREDICTED: 34
FVC: 2.88
FEV1/FVC_PERCENT_LLN: 62
FEV1/FVC: 25.18
FEV1/FVC_PERCENT_PREDICTED: 74
FEV1/FVC_PERCENT_CHANGE: 0
FVC_PREDICTED: 4.43
FEV1/FVC_PERCENT_CHANGE: 100
FEV1_PERCENT_PREDICTED: 22
FEV1/FVC: 25
FEV1/FVC_PERCENT_PREDICTED: 34
FEV1_PERCENT_CHANGE: -3
FVC_PERCENT_PREDICTED: 64
FEV1/FVC_PERCENT_PREDICTED: 34
FEV1/FVC: 25
FEV1_PERCENT_CHANGE: -3
FEV1/FVC_PERCENT_PREDICTED: 34
FEV1_PREDICTED: 3.26
FEV1/FVC: 25
FEV1: .7
FEV1_LLN: 2.72
FVC_PERCENT_PREDICTED: 62
FEV1/FVC_PREDICTED: 74

## 2019-09-10 ASSESSMENT — ENCOUNTER SYMPTOMS
HEARTBURN: 0
SORE THROAT: 0
SHORTNESS OF BREATH: 1
DIAPHORESIS: 0
TREMORS: 0
ORTHOPNEA: 0
CLAUDICATION: 0
WHEEZING: 0
HEADACHES: 0
SINUS PAIN: 0
FEVER: 0
PALPITATIONS: 0
SPUTUM PRODUCTION: 0
WEIGHT LOSS: 0
COUGH: 0
EYES NEGATIVE: 1
INSOMNIA: 1
CHILLS: 0
DIZZINESS: 0

## 2019-09-10 NOTE — PATIENT INSTRUCTIONS
1-reviewed PFT, slight improvement  2-reviewed CT, unchanged mostly  3-continue same home regimen, will let us know when refills needed  4-sent order for portable oxygen concentrator, will need 2 batteries   5-follow up in 3 months

## 2019-09-10 NOTE — PROGRESS NOTES
CC: Shortness of breath    HPI:  Mumtaz Carney is a 68 y.o. year old male here today for follow-up on COPD and chronic respiratory failure. Last seen in clinic 8/8/2019 by Dr. Askew for COPD exacerbation requiring steroids and antibiotic. Patient reports being a current some day smoker of 1 to 2 cigarettes/day most days, reports 52-pack-year history.  Complete smoking cessation advised.    Pertinent medical history includes bronchitis, hepatitis C, history of MRSA, bronchitis, bronchiectasis.    Reviewed in clinic vitals including blood pressure 120/60, heart rate of 87, O2 sat of 91% on 4 L and BMI of 25.25 kg/m².    Reviewed home medication regimen, does use albuterol 2-3 times per day, is O2 dependent, he is on Symbicort and Spiriva, Lasix and potassium, baby aspirin. Considered daliresp but history of liver dysfunction.  Patient does report improvement in symptoms post bronchodilator and prednisone course per  Reviewed most recent imaging including chest CT from 8/27/2019 demonstrating hyperexpanded and emphysematous lungs, apical blebs, bilateral peribronchial thickening compared to previous exam, peribronchial opacities within the right lower lobe, ill-defined opacities and bronchiectasis within the lingula similar to previous findings, atelectasis base of right middle lobe, unchanged punctate subpleural nodule right lower lobe as well as unchanged 3.6 mm nodule right lower lobe, moderate hiatal hernia, mesenteric fat herniated above the diaphragm.    Reviewed today's PFT results as compared to 2017 results including FEV1 of 0.73 or 22% predicted was 21% prior, FVC of 2.88 L or 64% predicted was 58% prior, FEV1/FVC ratio of 25, no significant post bronchodilator change, residual volume 211% predicted was 268% prior, % predicted was 133% prior, DLCO 55% predicted was 56% prior.  Per pulmonologist interpretation severe obstructive lung disease with no reversibility noted, increased airway resistance  noted, study consistent with stage IV COPD.    Review of Systems   Constitutional: Positive for malaise/fatigue. Negative for chills, diaphoresis, fever and weight loss.   HENT: Negative for congestion, hearing loss, nosebleeds, sinus pain, sore throat and tinnitus.    Eyes: Negative.         Lost prescription glasses needs new prescription anyway   Respiratory: Positive for shortness of breath (at rest as well as with activity). Negative for cough, sputum production and wheezing.    Cardiovascular: Negative for chest pain, palpitations, orthopnea, claudication and leg swelling.   Gastrointestinal: Negative for heartburn.        Upper denture, lost lower, no difficulty swallowing    Neurological: Negative for dizziness, tremors and headaches.   Psychiatric/Behavioral: The patient has insomnia (disrupted sleep schedule ).        Past Medical History:   Diagnosis Date   • Aortic stenosis    • Breath shortness    • Bronchitis    • CHF (congestive heart failure) (HCC) 6/11/2010   • Cirrhosis (HCC) 2014   • COPD    • COPD (chronic obstructive pulmonary disease) (HCC)    • HCV (hepatitis C virus) 1/16/2014   • Heart burn    • Hepatitis C 1/16/2014   • Hypertension    • Indigestion    • MRSA (methicillin resistant Staphylococcus aureus)    • Other emphysema (HCC)    • Other specified disorder of intestines    • Psychiatric problem    • Severe aortic stenosis 12/17/2013   • Tobacco abuse 6/11/2010   • Wound     left shoulder       Past Surgical History:   Procedure Laterality Date   • RECOVERY  4/4/2014    Performed by [facility] at SURGERY SAME DAY [facility] ORS   • HERNIA REP INGUINAL     • OTHER      dental 2006   • OTHER ORTHOPEDIC SURGERY      right knee       Family History   Problem Relation Age of Onset   • Cancer Mother    • No Known Problems Father    • No Known Problems Maternal Grandmother    • No Known Problems Maternal Grandfather    • No Known Problems Paternal Grandmother    • No Known Problems  "Paternal Grandfather        Social History     Socioeconomic History   • Marital status: Single     Spouse name: Not on file   • Number of children: Not on file   • Years of education: Not on file   • Highest education level: Not on file   Occupational History   • Not on file   Social Needs   • Financial resource strain: Not on file   • Food insecurity:     Worry: Not on file     Inability: Not on file   • Transportation needs:     Medical: Not on file     Non-medical: Not on file   Tobacco Use   • Smoking status: Former Smoker     Packs/day: 2.00     Types: Cigarettes     Last attempt to quit: 2017     Years since quittin.7   • Smokeless tobacco: Never Used   • Tobacco comment: ppd  1 pack per day/ started smoking at age 17   Substance and Sexual Activity   • Alcohol use: No     Alcohol/week: 0.0 oz     Comment: quit \" 8 years ago\"   • Drug use: No     Comment: stopped heroin 3 years ago, stopped methadone a year ago   • Sexual activity: Not Currently     Partners: Female   Lifestyle   • Physical activity:     Days per week: Not on file     Minutes per session: Not on file   • Stress: Not on file   Relationships   • Social connections:     Talks on phone: Not on file     Gets together: Not on file     Attends Mandaen service: Not on file     Active member of club or organization: Not on file     Attends meetings of clubs or organizations: Not on file     Relationship status: Not on file   • Intimate partner violence:     Fear of current or ex partner: Not on file     Emotionally abused: Not on file     Physically abused: Not on file     Forced sexual activity: Not on file   Other Topics Concern   • Not on file   Social History Narrative    ** Merged History Encounter **            Allergies as of 09/10/2019   • (No Known Allergies)        @Vital signs for this encounter:  Vitals:    09/10/19 1259   Height: 1.778 m (5' 10\")   Weight: 79.8 kg (176 lb)   Weight % change since last entry.: 0 %   BP: 120/60 "   Pulse: 87   BMI (Calculated): 25.25   Resp: 14       Current medications as of today   Current Outpatient Medications   Medication Sig Dispense Refill   • amLODIPine (NORVASC) 5 MG Tab TAKE 1 TABLET BY MOUTH EVERY DAY 90 Tab 3   • Blood Glucose Test Strips Test strips order: Test strips for One Touch Ultra mini meter. Sig: use once daily 100 Each 3   • VENTOLIN  (90 Base) MCG/ACT Aero Soln inhalation aerosol INHALE 2 PUFFS BY MOUTH EVERY 6 HOURS AS NEEDED 18 g 11   • [START ON 10/18/2019] oxyCODONE-acetaminophen (PERCOCET-10)  MG Tab Take 1 Tab by mouth 5 Times a Day for 30 days. 150 Tab 0   • omeprazole (PRILOSEC) 20 MG delayed-release capsule TAKE 1 CAPSULE BY MOUTH TWICE DAILY 180 Cap 3   • carvedilol (COREG) 3.125 MG Tab TAKE 1 TABLET BY MOUTH TWICE DAILY 180 Tab 3   • metformin (GLUCOPHAGE) 1000 MG tablet TAKE 1 TABLET BY MOUTH TWICE DAILY WITH MEALS FOR DIABETES 180 Tab 3   • sertraline (ZOLOFT) 50 MG Tab TAKE 1 TABLET BY MOUTH EVERY DAY 90 Tab 3   • pravastatin (PRAVACHOL) 10 MG Tab TAKE 1 TABLET BY MOUTH EVERY DAY 90 Tab 3   • tamsulosin (FLOMAX) 0.4 MG capsule TAKE 2 CAPSULES BY MOUTH EVERY  Cap 3   • SYMBICORT 160-4.5 MCG/ACT Aerosol INHALE 2 PUFFS BY MOUTH TWICE DAILY; RINSE MOUTH AFTER EACH USE 10.2 g 11   • SPIRIVA HANDIHALER 18 MCG Cap INHALE 1 CAPSULE BY MOUTH VIA HANDIHALER EVERY DAY 90 Cap 3   • furosemide (LASIX) 20 MG Tab TAKE 1 TABLET BY MOUTH DAILY 90 Tab 3   • potassium chloride SA (KDUR) 20 MEQ Tab CR TAKE 1 TABLET BY MOUTH EVERY DAY 90 Tab 3   • lisinopril (PRINIVIL) 20 MG Tab Take 1 Tab by mouth every day. 90 Tab 3   • buPROPion SR (WELLBUTRIN-SR) 150 MG TABLET SR 12 HR sustained-release tablet TAKE 1 TABLET BY MOUTH TWICE DAILY 180 Tab 3   • aspirin (ASA) 325 MG Tab Take 325 mg by mouth every day.     • ondansetron (ZOFRAN ODT) 4 MG TABLET DISPERSIBLE Take 1 Tab by mouth every 8 hours as needed for Nausea/Vomiting. 10 Tab 0     No current facility-administered  medications for this visit.          Physical Exam:   Gen:           Alert and oriented, No apparent distress. Mood and affect     appropriate, normal interaction with provider.  Eyes:          sclere white, conjunctive moist.  Hearing:     Grossly intact.  Dentition:    Upper dentures, edentulous  Oropharynx:   Tongue normal, posterior pharynx without erythema or exudate.  Neck:        Supple, trachea midline, no masses.  Respiratory Effort: +use of accessory muscles, no intercostal retractions.   Lung Auscultation:      Diminished bilateral; no rales, rhonchi or wheezing.  CV:            Regular rate and rhythm. No edema. No murmurs, rubs or gallops.  Digits, Nails, Ext: No clubbing, cyanosis, petechiae, or nodes.   Skin:        No rashes, lesions or ulcers noted on back or exposed skin    surfaces.      Gait and station: Motorized wheelchair                 Assessment:  1. Hypoxia  DME Other   2. Chronic obstructive pulmonary disease, unspecified COPD type (HCC)   no change in home regimen, dependent on public transportation does not feel pulmonary rehab is option with co-pay   3. Tobacco abuse   smoking cessation advised, using nicotine patch intermittently       Immunizations:    Flu: 9/18/2017, recommend updating September/October 2019  Pneumovax 23: 7/23/2014  Prevnar 13: 3/30/2017    Plan:    1-reviewed PFT  2-reviewed CT, unchanged mostly, will need annually at least  3-continue same home regimen, will let us know when refills needed  4-sent order for portable oxygen concentrator, will need 2 batteries   5-follow up in 3 months     This dictation was created using voice recognition software. The accuracy of the dictation is limited to the abilities of the software. I expect there may be some errors of grammar and possibly content.

## 2019-09-10 NOTE — PROCEDURES
Technician: MORRIS Richard    Technician Comment:  Good patient effort & cooperation. Except for the DLCO,  The results of this test meet the ATS/ERS standards for acceptability & reproducibility.  Test was performed on the [employer] Body Plethysmograph-Elite DX system.  Predicted values were [facility]-3 for spirometry, [facility] for DLCO, ITS for Lung Volumes.  The DLCO was uncorrected for Hgb.  A bronchodilator of Ventolin HFA -2puffs via spacer administered.  DLCO performed during dilation period.    1. Baseline spirometry demonstrates a severe reduction in FEV1 at 0.73 L which is 22% of predicted. FEV1/FVC ratio is severely reduced at 25%.    2. After administration of an inhaled bronchodilator there is no improvement in FEV1.    3. Lung volumes demonstrate hyperinflation.    4. Gas exchange as estimated by DLCO is reduced at 55% of predicted.    5. Airway resistance is increased.      Impression:    This study demonstrates the presence of severe obstructive lung disease.  No reversibility is noted on the study.

## 2019-09-17 RX ORDER — POTASSIUM CHLORIDE 20 MEQ/1
TABLET, EXTENDED RELEASE ORAL
Qty: 90 TAB | Refills: 3 | Status: SHIPPED | OUTPATIENT
Start: 2019-09-17

## 2019-09-18 ENCOUNTER — HOSPITAL ENCOUNTER (OUTPATIENT)
Dept: RADIOLOGY | Facility: MEDICAL CENTER | Age: 69
End: 2019-09-18
Attending: INTERNAL MEDICINE
Payer: MEDICARE

## 2019-09-18 DIAGNOSIS — E11.8 TYPE 2 DIABETES MELLITUS WITH COMPLICATION, UNSPECIFIED WHETHER LONG TERM INSULIN USE: ICD-10-CM

## 2019-09-18 DIAGNOSIS — J44.9 CHRONIC OBSTRUCTIVE PULMONARY DISEASE, UNSPECIFIED COPD TYPE (HCC): ICD-10-CM

## 2019-09-18 DIAGNOSIS — K74.60 HEPATIC CIRRHOSIS, UNSPECIFIED HEPATIC CIRRHOSIS TYPE, UNSPECIFIED WHETHER ASCITES PRESENT (HCC): ICD-10-CM

## 2019-09-18 PROCEDURE — 76700 US EXAM ABDOM COMPLETE: CPT

## 2019-11-06 ENCOUNTER — HOSPITAL ENCOUNTER (OUTPATIENT)
Dept: LAB | Facility: MEDICAL CENTER | Age: 69
End: 2019-11-06
Attending: FAMILY MEDICINE
Payer: MEDICARE

## 2019-11-06 DIAGNOSIS — E11.42 CONTROLLED TYPE 2 DIABETES MELLITUS WITH DIABETIC POLYNEUROPATHY, WITHOUT LONG-TERM CURRENT USE OF INSULIN (HCC): ICD-10-CM

## 2019-11-06 LAB
ALBUMIN SERPL BCP-MCNC: 4.9 G/DL (ref 3.2–4.9)
ALBUMIN/GLOB SERPL: 1.6 G/DL
ALP SERPL-CCNC: 68 U/L (ref 30–99)
ALT SERPL-CCNC: 14 U/L (ref 2–50)
ANION GAP SERPL CALC-SCNC: 6 MMOL/L (ref 0–11.9)
AST SERPL-CCNC: 14 U/L (ref 12–45)
BASOPHILS # BLD AUTO: 0.2 % (ref 0–1.8)
BASOPHILS # BLD: 0.02 K/UL (ref 0–0.12)
BILIRUB SERPL-MCNC: 0.4 MG/DL (ref 0.1–1.5)
BUN SERPL-MCNC: 24 MG/DL (ref 8–22)
CALCIUM SERPL-MCNC: 9.4 MG/DL (ref 8.5–10.5)
CHLORIDE SERPL-SCNC: 96 MMOL/L (ref 96–112)
CHOLEST SERPL-MCNC: 155 MG/DL (ref 100–199)
CO2 SERPL-SCNC: 38 MMOL/L (ref 20–33)
CREAT SERPL-MCNC: 0.71 MG/DL (ref 0.5–1.4)
CREAT UR-MCNC: 223.2 MG/DL
EOSINOPHIL # BLD AUTO: 0.2 K/UL (ref 0–0.51)
EOSINOPHIL NFR BLD: 2.2 % (ref 0–6.9)
ERYTHROCYTE [DISTWIDTH] IN BLOOD BY AUTOMATED COUNT: 42.6 FL (ref 35.9–50)
EST. AVERAGE GLUCOSE BLD GHB EST-MCNC: 160 MG/DL
FASTING STATUS PATIENT QL REPORTED: NORMAL
GLOBULIN SER CALC-MCNC: 3 G/DL (ref 1.9–3.5)
GLUCOSE SERPL-MCNC: 192 MG/DL (ref 65–99)
HBA1C MFR BLD: 7.2 % (ref 0–5.6)
HCT VFR BLD AUTO: 40.8 % (ref 42–52)
HDLC SERPL-MCNC: 40 MG/DL
HGB BLD-MCNC: 13.2 G/DL (ref 14–18)
IMM GRANULOCYTES # BLD AUTO: 0.02 K/UL (ref 0–0.11)
IMM GRANULOCYTES NFR BLD AUTO: 0.2 % (ref 0–0.9)
LDLC SERPL CALC-MCNC: 101 MG/DL
LYMPHOCYTES # BLD AUTO: 2.41 K/UL (ref 1–4.8)
LYMPHOCYTES NFR BLD: 26.2 % (ref 22–41)
MCH RBC QN AUTO: 31.1 PG (ref 27–33)
MCHC RBC AUTO-ENTMCNC: 32.4 G/DL (ref 33.7–35.3)
MCV RBC AUTO: 96 FL (ref 81.4–97.8)
MICROALBUMIN UR-MCNC: 5.1 MG/DL
MICROALBUMIN/CREAT UR: 23 MG/G (ref 0–30)
MONOCYTES # BLD AUTO: 0.74 K/UL (ref 0–0.85)
MONOCYTES NFR BLD AUTO: 8 % (ref 0–13.4)
NEUTROPHILS # BLD AUTO: 5.81 K/UL (ref 1.82–7.42)
NEUTROPHILS NFR BLD: 63.2 % (ref 44–72)
NRBC # BLD AUTO: 0 K/UL
NRBC BLD-RTO: 0 /100 WBC
PLATELET # BLD AUTO: 200 K/UL (ref 164–446)
PMV BLD AUTO: 10.9 FL (ref 9–12.9)
POTASSIUM SERPL-SCNC: 4.6 MMOL/L (ref 3.6–5.5)
PROT SERPL-MCNC: 7.9 G/DL (ref 6–8.2)
RBC # BLD AUTO: 4.25 M/UL (ref 4.7–6.1)
SODIUM SERPL-SCNC: 140 MMOL/L (ref 135–145)
TRIGL SERPL-MCNC: 71 MG/DL (ref 0–149)
WBC # BLD AUTO: 9.2 K/UL (ref 4.8–10.8)

## 2019-11-06 PROCEDURE — 36415 COLL VENOUS BLD VENIPUNCTURE: CPT

## 2019-11-06 PROCEDURE — 80053 COMPREHEN METABOLIC PANEL: CPT

## 2019-11-06 PROCEDURE — 82570 ASSAY OF URINE CREATININE: CPT

## 2019-11-06 PROCEDURE — 84443 ASSAY THYROID STIM HORMONE: CPT

## 2019-11-06 PROCEDURE — 83036 HEMOGLOBIN GLYCOSYLATED A1C: CPT | Mod: GA

## 2019-11-06 PROCEDURE — 82043 UR ALBUMIN QUANTITATIVE: CPT

## 2019-11-06 PROCEDURE — 85025 COMPLETE CBC W/AUTO DIFF WBC: CPT

## 2019-11-06 PROCEDURE — 80061 LIPID PANEL: CPT

## 2019-11-07 LAB — TSH SERPL DL<=0.005 MIU/L-ACNC: 1.32 UIU/ML (ref 0.38–5.33)

## 2019-11-08 ENCOUNTER — OFFICE VISIT (OUTPATIENT)
Dept: MEDICAL GROUP | Facility: MEDICAL CENTER | Age: 69
End: 2019-11-08
Payer: MEDICARE

## 2019-11-08 VITALS
SYSTOLIC BLOOD PRESSURE: 134 MMHG | DIASTOLIC BLOOD PRESSURE: 78 MMHG | HEART RATE: 89 BPM | WEIGHT: 175 LBS | HEIGHT: 70 IN | OXYGEN SATURATION: 92 % | TEMPERATURE: 97.5 F | BODY MASS INDEX: 25.05 KG/M2

## 2019-11-08 DIAGNOSIS — J44.9 CHRONIC OBSTRUCTIVE PULMONARY DISEASE, UNSPECIFIED COPD TYPE (HCC): Chronic | ICD-10-CM

## 2019-11-08 DIAGNOSIS — G89.29 CHRONIC MIDLINE LOW BACK PAIN WITHOUT SCIATICA: ICD-10-CM

## 2019-11-08 DIAGNOSIS — Z23 NEED FOR VACCINATION: ICD-10-CM

## 2019-11-08 DIAGNOSIS — E11.42 CONTROLLED TYPE 2 DIABETES MELLITUS WITH DIABETIC POLYNEUROPATHY, WITHOUT LONG-TERM CURRENT USE OF INSULIN (HCC): ICD-10-CM

## 2019-11-08 DIAGNOSIS — M54.50 CHRONIC MIDLINE LOW BACK PAIN WITHOUT SCIATICA: ICD-10-CM

## 2019-11-08 PROCEDURE — G0008 ADMIN INFLUENZA VIRUS VAC: HCPCS | Performed by: FAMILY MEDICINE

## 2019-11-08 PROCEDURE — 90662 IIV NO PRSV INCREASED AG IM: CPT | Performed by: FAMILY MEDICINE

## 2019-11-08 PROCEDURE — 99214 OFFICE O/P EST MOD 30 MIN: CPT | Mod: 25 | Performed by: FAMILY MEDICINE

## 2019-11-08 RX ORDER — OXYCODONE AND ACETAMINOPHEN 10; 325 MG/1; MG/1
1 TABLET ORAL
Qty: 150 TAB | Refills: 0 | Status: SHIPPED | OUTPATIENT
Start: 2020-01-03 | End: 2020-02-07 | Stop reason: SDUPTHER

## 2019-11-08 RX ORDER — OXYCODONE AND ACETAMINOPHEN 10; 325 MG/1; MG/1
1 TABLET ORAL
Qty: 150 TAB | Refills: 0 | Status: SHIPPED | OUTPATIENT
Start: 2019-12-06 | End: 2019-11-08 | Stop reason: SDUPTHER

## 2019-11-08 RX ORDER — OXYCODONE AND ACETAMINOPHEN 10; 325 MG/1; MG/1
1 TABLET ORAL
Qty: 150 TAB | Refills: 0 | Status: SHIPPED | OUTPATIENT
Start: 2019-11-08 | End: 2019-11-08 | Stop reason: SDUPTHER

## 2019-11-08 NOTE — PROGRESS NOTES
Subjective:   Mumtaz Carney is a 68 y.o. male here today for lower back pain    Chronic midline low back pain without sciatica  Chronic back pain. Patient has known wedge fracture in Thoracic spine, recently seen on x-ray.   Percocet 10 mg tablets, 5 times daily  Current pain control: good, 4/10  Adverse effects: none.  Physical functioning: good  Mood: fair  Family and social relationships: fair  Sleep pattern: good  Overall functioning: fair  Nonnarcotic treatments that are being used: nothing right now, has tried topical OTC pain relievers    Pain management agreement initiated and signed on: May 2019  Last dose of narcotic medication: this morning  Most recent urine drug screen done: November 2018, which was reviewed today and consistent. No longer on Xanax, weaned off.  Aberrant Behaviors: None  I have reviewed the medical records, the Prescription Monitoring Program and I have determined that percocet 10, 5 times daily, is medically indicated.    I have advised patient to keep medication in a safe place and to not drive with medication.    Diabetes mellitus type 2, controlled  Patient is tolerating metformin 1000 mg twice daily.  A1c has improved from 7.6 down to 7.2.    COPD (chronic obstructive pulmonary disease)  Patient is being followed by pulmonology.  He is currently on Spiriva, Symbicort and supplemental oxygen.  He states that his shortness of breath is stable.  He uses a motorized wheelchair to help ambulate because of his significant shortness of breath with ambulation.    He continues to smoke.  He has decreased the amount he is smoking.  He has tried nicotine replacement without success at quitting.  He very much would like to quit but understands that he has a physical and psychological dependence on nicotine.         Current medicines (including changes today)  Current Outpatient Medications   Medication Sig Dispense Refill   • [START ON 1/3/2020] oxyCODONE-acetaminophen (PERCOCET-10)   MG Tab Take 1 Tab by mouth 5 Times a Day for 30 days. 150 Tab 0   • potassium chloride SA (KDUR) 20 MEQ Tab CR TAKE 1 TABLET BY MOUTH EVERY DAY 90 Tab 3   • amLODIPine (NORVASC) 5 MG Tab TAKE 1 TABLET BY MOUTH EVERY DAY 90 Tab 3   • Blood Glucose Test Strips Test strips order: Test strips for One Touch Ultra mini meter. Sig: use once daily 100 Each 3   • VENTOLIN  (90 Base) MCG/ACT Aero Soln inhalation aerosol INHALE 2 PUFFS BY MOUTH EVERY 6 HOURS AS NEEDED 18 g 11   • omeprazole (PRILOSEC) 20 MG delayed-release capsule TAKE 1 CAPSULE BY MOUTH TWICE DAILY 180 Cap 3   • carvedilol (COREG) 3.125 MG Tab TAKE 1 TABLET BY MOUTH TWICE DAILY 180 Tab 3   • metformin (GLUCOPHAGE) 1000 MG tablet TAKE 1 TABLET BY MOUTH TWICE DAILY WITH MEALS FOR DIABETES 180 Tab 3   • sertraline (ZOLOFT) 50 MG Tab TAKE 1 TABLET BY MOUTH EVERY DAY 90 Tab 3   • pravastatin (PRAVACHOL) 10 MG Tab TAKE 1 TABLET BY MOUTH EVERY DAY 90 Tab 3   • tamsulosin (FLOMAX) 0.4 MG capsule TAKE 2 CAPSULES BY MOUTH EVERY  Cap 3   • SYMBICORT 160-4.5 MCG/ACT Aerosol INHALE 2 PUFFS BY MOUTH TWICE DAILY; RINSE MOUTH AFTER EACH USE 10.2 g 11   • SPIRIVA HANDIHALER 18 MCG Cap INHALE 1 CAPSULE BY MOUTH VIA HANDIHALER EVERY DAY 90 Cap 3   • furosemide (LASIX) 20 MG Tab TAKE 1 TABLET BY MOUTH DAILY 90 Tab 3   • lisinopril (PRINIVIL) 20 MG Tab Take 1 Tab by mouth every day. 90 Tab 3   • buPROPion SR (WELLBUTRIN-SR) 150 MG TABLET SR 12 HR sustained-release tablet TAKE 1 TABLET BY MOUTH TWICE DAILY 180 Tab 3   • aspirin (ASA) 325 MG Tab Take 325 mg by mouth every day.     • ondansetron (ZOFRAN ODT) 4 MG TABLET DISPERSIBLE Take 1 Tab by mouth every 8 hours as needed for Nausea/Vomiting. 10 Tab 0     No current facility-administered medications for this visit.      He  has a past medical history of Aortic stenosis, Breath shortness, Bronchitis, CHF (congestive heart failure) ([facility]) (6/11/2010), Cirrhosis ([facility]) (2014), COPD, COPD (chronic obstructive  "pulmonary disease) (HCC), HCV (hepatitis C virus) (1/16/2014), Heart burn, Hepatitis C (1/16/2014), Hypertension, Indigestion, MRSA (methicillin resistant Staphylococcus aureus), Other emphysema (HCC), Other specified disorder of intestines, Psychiatric problem, Severe aortic stenosis (12/17/2013), Tobacco abuse (6/11/2010), and Wound. He also has no past medical history of Angina, Arrhythmia, Arthritis, ASTHMA, Backpain, Cancer (HCC), CATARACT, Diabetes, Dialysis, Glaucoma, Heart murmur, Jaundice, Myocardial infarct (HCC), Other specified symptom associated with female genital organs, Pacemaker, Personal history of venous thrombosis and embolism, Pneumonia, Renal disorder, Rheumatic fever, Seizure (HCC), Stroke (HCC), Unspecified disorder of thyroid, Unspecified hemorrhagic conditions, or Unspecified urinary incontinence.    ROS   Positive back pain, no fever       Objective:     /78 (BP Location: Right arm, Patient Position: Sitting)   Pulse 89   Temp 36.4 °C (97.5 °F)   Ht 1.778 m (5' 10\")   Wt 79.4 kg (175 lb)   SpO2 92%  Body mass index is 25.11 kg/m².   Physical Exam:  Constitutional: Alert, no distress.  Supplemental oxygen in place.  Patient motorized wheelchair.  Skin: Warm, dry, good turgor, no rashes in visible areas.  Eye: Equal, round and reactive, conjunctiva clear, lids normal.  Psych: Alert and oriented x3, normal affect and mood.        Assessment and Plan:   The following treatment plan was discussed    1. Chronic midline low back pain without sciatica  Stable.  Refill Percocet for next 3 months at current dose.  Follow-up in 3 months for additional refills.  Annual [facility] drug screen done today.  - PAIN MANAGEMENT SCRN, UR; Future  - oxyCODONE-acetaminophen (PERCOCET-10)  MG Tab; Take 1 Tab by mouth 5 Times a Day for 30 days.  Dispense: 150 Tab; Refill: 0    2. Controlled type 2 diabetes mellitus with diabetic polyneuropathy, without long-term current use of insulin " (HCC)  Controlled for age.  Continue metformin.    3. Chronic obstructive pulmonary disease, unspecified COPD type (HCC)  Encourage patient to continue trying to quit smoking.  Continue supplemental oxygen and inhalers as prescribed by pulmonologist.    4. Need for vaccination  - Influenza Vaccine, High Dose (65+ Only)      Followup: Return in about 3 months (around 2/8/2020) for Pain medication refill.

## 2019-11-08 NOTE — ASSESSMENT & PLAN NOTE
Patient is being followed by pulmonology.  He is currently on Spiriva, Symbicort and supplemental oxygen.  He states that his shortness of breath is stable.  He uses a motorized wheelchair to help ambulate because of his significant shortness of breath with ambulation.    He continues to smoke.  He has decreased the amount he is smoking.  He has tried nicotine replacement without success at quitting.  He very much would like to quit but understands that he has a physical and psychological dependence on nicotine.

## 2019-11-13 DIAGNOSIS — I10 ESSENTIAL HYPERTENSION: Chronic | ICD-10-CM

## 2019-11-13 DIAGNOSIS — J44.1 CHRONIC OBSTRUCTIVE PULMONARY DISEASE WITH ACUTE EXACERBATION (HCC): ICD-10-CM

## 2019-11-13 RX ORDER — LISINOPRIL 20 MG/1
20 TABLET ORAL DAILY
Qty: 90 TAB | Refills: 3 | Status: SHIPPED | OUTPATIENT
Start: 2019-11-13

## 2019-11-13 RX ORDER — PREDNISONE 20 MG/1
40 TABLET ORAL DAILY
Qty: 10 TAB | Refills: 0 | Status: SHIPPED | OUTPATIENT
Start: 2019-11-13 | End: 2019-11-18

## 2019-11-13 RX ORDER — TIOTROPIUM BROMIDE 18 UG/1
CAPSULE ORAL; RESPIRATORY (INHALATION)
Qty: 90 CAP | Refills: 3 | Status: SHIPPED | OUTPATIENT
Start: 2019-11-13

## 2019-11-13 NOTE — TELEPHONE ENCOUNTER
PATIENT ALSO NEEDS REFILLS FOR COPD STEROIDS.     Was the patient seen in the last year in this department? Yes    Does patient have an active prescription for medications requested? No     Received Request Via: Pharmacy

## 2019-11-25 ENCOUNTER — TELEPHONE (OUTPATIENT)
Dept: MEDICAL GROUP | Facility: MEDICAL CENTER | Age: 69
End: 2019-11-25

## 2019-11-25 RX ORDER — AZITHROMYCIN 250 MG/1
TABLET, FILM COATED ORAL
Qty: 6 TAB | Refills: 0 | Status: SHIPPED | OUTPATIENT
Start: 2019-11-25 | End: 2019-11-30

## 2019-12-27 RX ORDER — FUROSEMIDE 20 MG/1
TABLET ORAL
Qty: 90 TAB | Refills: 3 | Status: SHIPPED | OUTPATIENT
Start: 2019-12-27

## 2020-01-14 ENCOUNTER — TELEPHONE (OUTPATIENT)
Dept: MEDICAL GROUP | Facility: MEDICAL CENTER | Age: 70
End: 2020-01-14

## 2020-01-14 NOTE — TELEPHONE ENCOUNTER
Patient has chest congestion and is requesting an rx for a decongestant. Patient would also like steroids as he doesn't want a flare-up of his COPD. Please advise.

## 2020-01-15 RX ORDER — PREDNISONE 20 MG/1
40 TABLET ORAL DAILY
Qty: 10 TAB | Refills: 0 | Status: SHIPPED | OUTPATIENT
Start: 2020-01-15 | End: 2020-01-20

## 2020-01-15 RX ORDER — AZITHROMYCIN 250 MG/1
TABLET, FILM COATED ORAL
Qty: 6 TAB | Refills: 0 | Status: SHIPPED | OUTPATIENT
Start: 2020-01-15 | End: 2020-01-20

## 2020-01-15 NOTE — TELEPHONE ENCOUNTER
I have sent in a prescription for prednisone and z-dionne to help with his chest congestion and make sure his congestion doesn't become worse with his COPD.  Marlo Reynolds M.D.

## 2020-02-07 ENCOUNTER — OFFICE VISIT (OUTPATIENT)
Dept: MEDICAL GROUP | Facility: MEDICAL CENTER | Age: 70
End: 2020-02-07
Payer: MEDICARE

## 2020-02-07 VITALS
HEART RATE: 91 BPM | OXYGEN SATURATION: 95 % | BODY MASS INDEX: 24.34 KG/M2 | HEIGHT: 70 IN | RESPIRATION RATE: 16 BRPM | SYSTOLIC BLOOD PRESSURE: 120 MMHG | DIASTOLIC BLOOD PRESSURE: 64 MMHG | TEMPERATURE: 99 F | WEIGHT: 170 LBS

## 2020-02-07 DIAGNOSIS — J44.9 CHRONIC OBSTRUCTIVE PULMONARY DISEASE, UNSPECIFIED COPD TYPE (HCC): Chronic | ICD-10-CM

## 2020-02-07 DIAGNOSIS — E11.42 CONTROLLED TYPE 2 DIABETES MELLITUS WITH DIABETIC POLYNEUROPATHY, WITHOUT LONG-TERM CURRENT USE OF INSULIN (HCC): ICD-10-CM

## 2020-02-07 DIAGNOSIS — M54.50 CHRONIC MIDLINE LOW BACK PAIN WITHOUT SCIATICA: ICD-10-CM

## 2020-02-07 DIAGNOSIS — G89.29 CHRONIC MIDLINE LOW BACK PAIN WITHOUT SCIATICA: ICD-10-CM

## 2020-02-07 PROCEDURE — 99214 OFFICE O/P EST MOD 30 MIN: CPT | Performed by: FAMILY MEDICINE

## 2020-02-07 RX ORDER — PREDNISONE 20 MG/1
40 TABLET ORAL DAILY
Qty: 10 TAB | Refills: 0 | Status: SHIPPED | OUTPATIENT
Start: 2020-02-07 | End: 2020-02-12

## 2020-02-07 RX ORDER — BUDESONIDE AND FORMOTEROL FUMARATE DIHYDRATE 160; 4.5 UG/1; UG/1
2 AEROSOL RESPIRATORY (INHALATION) 2 TIMES DAILY
Qty: 10.2 G | Refills: 11 | Status: SHIPPED | OUTPATIENT
Start: 2020-02-07

## 2020-02-07 RX ORDER — OXYCODONE AND ACETAMINOPHEN 10; 325 MG/1; MG/1
1 TABLET ORAL
Qty: 150 TAB | Refills: 0 | Status: SHIPPED | OUTPATIENT
Start: 2020-03-06 | End: 2020-02-07 | Stop reason: SDUPTHER

## 2020-02-07 RX ORDER — OXYCODONE AND ACETAMINOPHEN 10; 325 MG/1; MG/1
1 TABLET ORAL
Qty: 150 TAB | Refills: 0 | Status: SHIPPED | OUTPATIENT
Start: 2020-04-03 | End: 2020-05-03

## 2020-02-07 RX ORDER — OXYCODONE AND ACETAMINOPHEN 10; 325 MG/1; MG/1
1 TABLET ORAL
Qty: 150 TAB | Refills: 0 | Status: SHIPPED | OUTPATIENT
Start: 2020-02-07 | End: 2020-02-07 | Stop reason: SDUPTHER

## 2020-02-07 NOTE — PROGRESS NOTES
Subjective:   Mumtaz Carney is a 69 y.o. male here today for COPD and back pain    COPD (chronic obstructive pulmonary disease)  He is continuing to smoke. He has been using nebulizer more consistently because he has a new nebulizer machine. He is also using Spiriva. He does not have Symbicort anymore.    Chronic midline low back pain without sciatica  Chronic back pain. Patient has known wedge fracture in Thoracic spine, recently seen on x-ray.   Percocet 10 mg tablets, 5 times daily  Current pain control: good, 4/10  Adverse effects: none.  Physical functioning: good  Mood: fair  Family and social relationships: fair  Sleep pattern: good  Overall functioning: fair  Nonnarcotic treatments that are being used: nothing right now, has tried topical OTC pain relievers    Pain management agreement initiated and signed on: March 2019  Last dose of narcotic medication: this morning  Most recent urine drug screen done: November 2019, which was reviewed today and consistent. No longer on Xanax, weaned off.  Aberrant Behaviors: None  I have reviewed the medical records, the Prescription Monitoring Program and I have determined that percocet 10, 5 times daily, is medically indicated.    I have advised patient to keep medication in a safe place and to not drive with medication.    Diabetes mellitus type 2, controlled  Taking metformin 1000 mg twice daily. Last A1c 3 months ago was 7.2.         Current medicines (including changes today)  Current Outpatient Medications   Medication Sig Dispense Refill   • budesonide-formoterol (SYMBICORT) 160-4.5 MCG/ACT Aerosol Inhale 2 Puffs by mouth 2 Times a Day. 10.2 g 11   • predniSONE (DELTASONE) 20 MG Tab Take 2 Tabs by mouth every day for 5 days. 10 Tab 0   • [START ON 4/3/2020] oxyCODONE-acetaminophen (PERCOCET-10)  MG Tab Take 1 Tab by mouth 5 Times a Day for 30 days. 150 Tab 0   • furosemide (LASIX) 20 MG Tab TAKE 1 TABLET BY MOUTH DAILY 90 Tab 3   • lisinopril  (PRINIVIL) 20 MG Tab Take 1 Tab by mouth every day. 90 Tab 3   • tiotropium (SPIRIVA HANDIHALER) 18 MCG Cap INHALE 1 CAPSULE BY MOUTH VIA HANDIHALER EVERY DAY 90 Cap 3   • potassium chloride SA (KDUR) 20 MEQ Tab CR TAKE 1 TABLET BY MOUTH EVERY DAY 90 Tab 3   • amLODIPine (NORVASC) 5 MG Tab TAKE 1 TABLET BY MOUTH EVERY DAY 90 Tab 3   • Blood Glucose Test Strips Test strips order: Test strips for One Touch Ultra mini meter. Sig: use once daily 100 Each 3   • VENTOLIN  (90 Base) MCG/ACT Aero Soln inhalation aerosol INHALE 2 PUFFS BY MOUTH EVERY 6 HOURS AS NEEDED 18 g 11   • omeprazole (PRILOSEC) 20 MG delayed-release capsule TAKE 1 CAPSULE BY MOUTH TWICE DAILY 180 Cap 3   • carvedilol (COREG) 3.125 MG Tab TAKE 1 TABLET BY MOUTH TWICE DAILY 180 Tab 3   • metformin (GLUCOPHAGE) 1000 MG tablet TAKE 1 TABLET BY MOUTH TWICE DAILY WITH MEALS FOR DIABETES 180 Tab 3   • sertraline (ZOLOFT) 50 MG Tab TAKE 1 TABLET BY MOUTH EVERY DAY 90 Tab 3   • pravastatin (PRAVACHOL) 10 MG Tab TAKE 1 TABLET BY MOUTH EVERY DAY 90 Tab 3   • tamsulosin (FLOMAX) 0.4 MG capsule TAKE 2 CAPSULES BY MOUTH EVERY  Cap 3   • buPROPion SR (WELLBUTRIN-SR) 150 MG TABLET SR 12 HR sustained-release tablet TAKE 1 TABLET BY MOUTH TWICE DAILY 180 Tab 3   • aspirin (ASA) 325 MG Tab Take 325 mg by mouth every day.     • ondansetron (ZOFRAN ODT) 4 MG TABLET DISPERSIBLE Take 1 Tab by mouth every 8 hours as needed for Nausea/Vomiting. 10 Tab 0     No current facility-administered medications for this visit.      He  has a past medical history of Aortic stenosis, Breath shortness, Bronchitis, CHF (congestive heart failure) (HCC) (6/11/2010), Cirrhosis (HCC) (2014), COPD, COPD (chronic obstructive pulmonary disease) (HCC), HCV (hepatitis C virus) (1/16/2014), Heart burn, Hepatitis C (1/16/2014), Hypertension, Indigestion, MRSA (methicillin resistant Staphylococcus aureus), Other emphysema (HCC), Other specified disorder of intestines, Psychiatric  "problem, Severe aortic stenosis (12/17/2013), Tobacco abuse (6/11/2010), and Wound. He also has no past medical history of Angina, Arrhythmia, Arthritis, ASTHMA, Backpain, Cancer ([facility]), CATARACT, Diabetes, Dialysis, Glaucoma, Heart murmur, Jaundice, Myocardial infarct (HCC), Other specified symptom associated with female genital organs, Pacemaker, Personal history of venous thrombosis and embolism, Pneumonia, Renal disorder, Rheumatic fever, Seizure (HCC), Stroke ([facility]), Unspecified disorder of thyroid, Unspecified hemorrhagic conditions, or Unspecified urinary incontinence.    ROS   Positive shortness of breath, no fever       Objective:     /64 (BP Location: Right arm, Patient Position: Sitting, BP Cuff Size: Adult)   Pulse 91   Temp 37.2 °C (99 °F) (Temporal)   Resp 16   Ht 1.778 m (5' 10\")   Wt 77.1 kg (170 lb)   SpO2 95%  Body mass index is 24.39 kg/m².   Physical Exam:  Constitutional: Alert, no distress.  Patient motorized wheelchair with supplemental oxygen in place.  Skin: Warm, dry, good turgor, no rashes in visible areas.  Eye: Equal, round and reactive, conjunctiva clear, lids normal.  Psych: Alert and oriented x3, normal affect and mood.        Assessment and Plan:   The following treatment plan was discussed    1. Chronic obstructive pulmonary disease, unspecified COPD type ([facility])  Stable.  Encourage patient to quit smoking.  Continue current inhalers.  - budesonide-formoterol (SYMBICORT) 160-4.5 MCG/ACT Aerosol; Inhale 2 Puffs by mouth 2 Times a Day.  Dispense: 10.2 g; Refill: 11    2. Chronic midline low back pain without sciatica  Controlled.  Refill Percocet for 3 months, follow-up in 3 months for additional refills.  - oxyCODONE-acetaminophen (PERCOCET-10)  MG Tab; Take 1 Tab by mouth 5 Times a Day for 30 days.  Dispense: 150 Tab; Refill: 0    3. Controlled type 2 diabetes mellitus with diabetic polyneuropathy, without long-term current use of insulin ([facility])  Stable.  Continue " metformin.  Follow-up with in clinic A1c in 3 months.      Followup: Return in about 3 months (around 5/7/2020) for Pain medication refill, Diabetes.

## 2020-02-07 NOTE — ASSESSMENT & PLAN NOTE
Chronic back pain. Patient has known wedge fracture in Thoracic spine, recently seen on x-ray.   Percocet 10 mg tablets, 5 times daily  Current pain control: good, 4/10  Adverse effects: none.  Physical functioning: good  Mood: fair  Family and social relationships: fair  Sleep pattern: good  Overall functioning: fair  Nonnarcotic treatments that are being used: nothing right now, has tried topical OTC pain relievers    Pain management agreement initiated and signed on: March 2019  Last dose of narcotic medication: this morning  Most recent urine drug screen done: November 2019, which was reviewed today and consistent. No longer on Xanax, weaned off.  Aberrant Behaviors: None  I have reviewed the medical records, the Prescription Monitoring Program and I have determined that percocet 10, 5 times daily, is medically indicated.    I have advised patient to keep medication in a safe place and to not drive with medication.

## 2020-02-07 NOTE — ASSESSMENT & PLAN NOTE
He is continuing to smoke. He has been using nebulizer more consistently because he has a new nebulizer machine. He is also using Spiriva. He does not have Symbicort anymore.

## 2020-02-25 ENCOUNTER — HOSPITAL ENCOUNTER (EMERGENCY)
Facility: MEDICAL CENTER | Age: 70
End: 2020-02-26
Attending: EMERGENCY MEDICINE
Payer: MEDICARE

## 2020-02-25 ENCOUNTER — APPOINTMENT (OUTPATIENT)
Dept: RADIOLOGY | Facility: MEDICAL CENTER | Age: 70
End: 2020-02-25
Attending: EMERGENCY MEDICINE
Payer: MEDICARE

## 2020-02-25 DIAGNOSIS — R10.13 EPIGASTRIC PAIN: ICD-10-CM

## 2020-02-25 LAB
ALBUMIN SERPL BCP-MCNC: 4.4 G/DL (ref 3.2–4.9)
ALBUMIN/GLOB SERPL: 1.6 G/DL
ALP SERPL-CCNC: 55 U/L (ref 30–99)
ALT SERPL-CCNC: 12 U/L (ref 2–50)
ANION GAP SERPL CALC-SCNC: 10 MMOL/L (ref 0–11.9)
AST SERPL-CCNC: 8 U/L (ref 12–45)
BASOPHILS # BLD AUTO: 0.2 % (ref 0–1.8)
BASOPHILS # BLD: 0.04 K/UL (ref 0–0.12)
BILIRUB SERPL-MCNC: 0.3 MG/DL (ref 0.1–1.5)
BUN SERPL-MCNC: 24 MG/DL (ref 8–22)
CALCIUM SERPL-MCNC: 9.3 MG/DL (ref 8.5–10.5)
CHLORIDE SERPL-SCNC: 96 MMOL/L (ref 96–112)
CO2 SERPL-SCNC: 30 MMOL/L (ref 20–33)
CREAT SERPL-MCNC: 0.74 MG/DL (ref 0.5–1.4)
EKG IMPRESSION: NORMAL
EOSINOPHIL # BLD AUTO: 0 K/UL (ref 0–0.51)
EOSINOPHIL NFR BLD: 0 % (ref 0–6.9)
ERYTHROCYTE [DISTWIDTH] IN BLOOD BY AUTOMATED COUNT: 42.3 FL (ref 35.9–50)
GLOBULIN SER CALC-MCNC: 2.8 G/DL (ref 1.9–3.5)
GLUCOSE SERPL-MCNC: 279 MG/DL (ref 65–99)
HCT VFR BLD AUTO: 37.8 % (ref 42–52)
HGB BLD-MCNC: 12.3 G/DL (ref 14–18)
IMM GRANULOCYTES # BLD AUTO: 0.1 K/UL (ref 0–0.11)
IMM GRANULOCYTES NFR BLD AUTO: 0.6 % (ref 0–0.9)
LYMPHOCYTES # BLD AUTO: 1.1 K/UL (ref 1–4.8)
LYMPHOCYTES NFR BLD: 6.6 % (ref 22–41)
MCH RBC QN AUTO: 30.6 PG (ref 27–33)
MCHC RBC AUTO-ENTMCNC: 32.5 G/DL (ref 33.7–35.3)
MCV RBC AUTO: 94 FL (ref 81.4–97.8)
MONOCYTES # BLD AUTO: 0.3 K/UL (ref 0–0.85)
MONOCYTES NFR BLD AUTO: 1.8 % (ref 0–13.4)
NEUTROPHILS # BLD AUTO: 15.13 K/UL (ref 1.82–7.42)
NEUTROPHILS NFR BLD: 90.8 % (ref 44–72)
NRBC # BLD AUTO: 0 K/UL
NRBC BLD-RTO: 0 /100 WBC
NT-PROBNP SERPL IA-MCNC: 464 PG/ML (ref 0–125)
PLATELET # BLD AUTO: 192 K/UL (ref 164–446)
PMV BLD AUTO: 10.6 FL (ref 9–12.9)
POTASSIUM SERPL-SCNC: 4.3 MMOL/L (ref 3.6–5.5)
PROT SERPL-MCNC: 7.2 G/DL (ref 6–8.2)
RBC # BLD AUTO: 4.02 M/UL (ref 4.7–6.1)
SODIUM SERPL-SCNC: 136 MMOL/L (ref 135–145)
TROPONIN T SERPL-MCNC: 21 NG/L (ref 6–19)
TROPONIN T SERPL-MCNC: 21 NG/L (ref 6–19)
WBC # BLD AUTO: 16.7 K/UL (ref 4.8–10.8)

## 2020-02-25 PROCEDURE — 80053 COMPREHEN METABOLIC PANEL: CPT

## 2020-02-25 PROCEDURE — 85025 COMPLETE CBC W/AUTO DIFF WBC: CPT

## 2020-02-25 PROCEDURE — 83880 ASSAY OF NATRIURETIC PEPTIDE: CPT

## 2020-02-25 PROCEDURE — 700102 HCHG RX REV CODE 250 W/ 637 OVERRIDE(OP): Performed by: STUDENT IN AN ORGANIZED HEALTH CARE EDUCATION/TRAINING PROGRAM

## 2020-02-25 PROCEDURE — 93005 ELECTROCARDIOGRAM TRACING: CPT

## 2020-02-25 PROCEDURE — 700101 HCHG RX REV CODE 250: Performed by: EMERGENCY MEDICINE

## 2020-02-25 PROCEDURE — 93005 ELECTROCARDIOGRAM TRACING: CPT | Performed by: EMERGENCY MEDICINE

## 2020-02-25 PROCEDURE — 99285 EMERGENCY DEPT VISIT HI MDM: CPT

## 2020-02-25 PROCEDURE — 71045 X-RAY EXAM CHEST 1 VIEW: CPT

## 2020-02-25 PROCEDURE — 94640 AIRWAY INHALATION TREATMENT: CPT

## 2020-02-25 PROCEDURE — 36415 COLL VENOUS BLD VENIPUNCTURE: CPT

## 2020-02-25 PROCEDURE — A9270 NON-COVERED ITEM OR SERVICE: HCPCS | Performed by: STUDENT IN AN ORGANIZED HEALTH CARE EDUCATION/TRAINING PROGRAM

## 2020-02-25 PROCEDURE — 84484 ASSAY OF TROPONIN QUANT: CPT | Mod: 91

## 2020-02-25 RX ORDER — OXYCODONE AND ACETAMINOPHEN 10; 325 MG/1; MG/1
1 TABLET ORAL ONCE
Status: COMPLETED | OUTPATIENT
Start: 2020-02-25 | End: 2020-02-25

## 2020-02-25 RX ORDER — IPRATROPIUM BROMIDE AND ALBUTEROL SULFATE 2.5; .5 MG/3ML; MG/3ML
3 SOLUTION RESPIRATORY (INHALATION)
Status: DISCONTINUED | OUTPATIENT
Start: 2020-02-25 | End: 2020-02-26 | Stop reason: HOSPADM

## 2020-02-25 RX ADMIN — IPRATROPIUM BROMIDE AND ALBUTEROL SULFATE 3 ML: .5; 3 SOLUTION RESPIRATORY (INHALATION) at 20:52

## 2020-02-25 RX ADMIN — OXYCODONE HYDROCHLORIDE AND ACETAMINOPHEN 1 TABLET: 10; 325 TABLET ORAL at 21:55

## 2020-02-25 ASSESSMENT — HEART SCORE: AGE: >64

## 2020-02-25 ASSESSMENT — PAIN DESCRIPTION - DESCRIPTORS: DESCRIPTORS: SHARP

## 2020-02-26 VITALS
WEIGHT: 172 LBS | SYSTOLIC BLOOD PRESSURE: 129 MMHG | RESPIRATION RATE: 18 BRPM | HEART RATE: 83 BPM | OXYGEN SATURATION: 98 % | DIASTOLIC BLOOD PRESSURE: 60 MMHG | TEMPERATURE: 98 F | BODY MASS INDEX: 24.62 KG/M2 | HEIGHT: 70 IN

## 2020-02-26 NOTE — ED TRIAGE NOTES
"Chief Complaint   Patient presents with   • Chest Pain     3 days intermittently midsternal    • Shortness of Breath     Pt BIB EMS for above. Pt is diaphoretic and SOB. Pt reports always being SOB. Pt c/o 8/10 chest pain intermittently for 3 days. Pt states his pain \"is pretty good, I dont feel any\". Pt has hx of DM, HTN, and COPD.  for EMS. Pt wears 4LO2 chronically, 87% on RA. Pt received 324 ASA and 50 fentanyl via EMS.       "

## 2020-02-26 NOTE — DISCHARGE PLANNING
Medical Social Work    Referral: O2    Intervention: MSW called Ed on-call ([phone]) and spoke w/ Jessi. MSW explained that pt is medically clear to dc home but needs a loaner tank to get home. Jessi connected MSW w/ the on-call , Kalia, who stated that pt can be given a tank from the supply of tanks that Ed keeps w/ Respiratory.     Plan: MSW contacted the respiratory aide ([phone] and they will be bringing O2 tank to bedside.

## 2020-02-26 NOTE — DISCHARGE INSTRUCTIONS
The exact cause of your pain is not clear, but we do not see any signs of a dangerous cause, or of damage to your heart or lungs.  Please stick to a bland diet for the next few days.  Consider sticking to a liquid diet for the next 24 hours.  Schedule follow-up with your primary care doctor.  Return to the emergency department for any worsening symptoms, instead of gradual improvement.    Your pain medication can cause a lot of stomach upset.  We recommend that people take a stool softener such as Colace every time they take narcotic pain medications.

## 2020-02-26 NOTE — ED PROVIDER NOTES
CHIEF COMPLAINT(1/4)  Chief Complaint   Patient presents with   • Chest Pain     3 days intermittently midsternal    • Shortness of Breath       HPI  Mumtaz Carney is a 69 y.o. male who presents with chest pain with associated shortness of breath.  In indicating the location of his pain, he is actually pointing to the epigastric area.  He has a past medical history of COPD on 4 L of oxygen, hypertension, diabetes not on insulin, half pack smoker per day.  He has had several episodes over the last few days, each lasting from half an hour to an hour long.  It is a sharp pain located substernally and radiates to both sides of the lower chest, along lower rib border/upper abdomen.  It seems exacerbated by eating.  Does not radiate to arm or jaw.  There is associated diaphoresis.  Shortness of breath is also worse during these episodes.  He has not had diaphoresis or vomiting or exertional symptoms or orthopnea.  He has run out of Spiriva and possibly Symbicort at home.  Does not have a history of coronary artery disease or family history        REVIEW OF SYSTEMS(1/10)  Pertinent positives include: Chest pain, shortness of breath, diaphoresis.  Pertinent negatives include: Palpitations, back pain, fevers chills, cough.   All other systems are negative.     PAST MEDICAL HISTORY(PFS1,2)  Past Medical History:   Diagnosis Date   • Aortic stenosis    • Breath shortness    • Bronchitis    • CHF (congestive heart failure) (HCC) 6/11/2010   • Cirrhosis (HCC) 2014   • COPD    • COPD (chronic obstructive pulmonary disease) (HCC)    • HCV (hepatitis C virus) 1/16/2014   • Heart burn    • Hepatitis C 1/16/2014   • Hypertension    • Indigestion    • MRSA (methicillin resistant Staphylococcus aureus)    • Other emphysema (HCC)    • Other specified disorder of intestines    • Psychiatric problem    • Severe aortic stenosis 12/17/2013   • Tobacco abuse 6/11/2010   • Wound     left shoulder       FAMILY HISTORY  Family History  "  Problem Relation Age of Onset   • Cancer Mother    • No Known Problems Father    • No Known Problems Maternal Grandmother    • No Known Problems Maternal Grandfather    • No Known Problems Paternal Grandmother    • No Known Problems Paternal Grandfather        SOCIAL HISTORY  Social History     Tobacco Use   • Smoking status: Current Some Day Smoker     Packs/day: 1.00     Years: 52.00     Pack years: 52.00     Types: Cigarettes     Start date: 1967   • Smokeless tobacco: Never Used   • Tobacco comment: current 1-2 cigarettes per day most days   Substance Use Topics   • Alcohol use: No     Alcohol/week: 0.0 oz     Comment: quit \" 8 years ago\"   • Drug use: No     Comment: stopped heroin 3 years ago, stopped methadone a year ago     Social History     Substance and Sexual Activity   Drug Use No    Comment: stopped heroin 3 years ago, stopped methadone a year ago       SURGICAL HISTORY  Past Surgical History:   Procedure Laterality Date   • RECOVERY  4/4/2014    Performed by [facility] at SURGERY SAME DAY [facility] ORS   • HERNIA REP INGUINAL     • OTHER      dental 2006   • OTHER ORTHOPEDIC SURGERY      right knee       CURRENT MEDICATIONS  Home Medications    **Home medications have not yet been reviewed for this encounter**         ALLERGIES  No Known Allergies    PHYSICAL EXAM  VITAL SIGNS: /60   Pulse 83   Temp 36.7 °C (98 °F) (Temporal)   Resp 18   Ht 1.778 m (5' 10\")   Wt 78 kg (172 lb)   SpO2 98%   BMI 24.68 kg/m²  Reviewed and within normal limits  Constitutional: Well developed, Well nourished, no acute distress.  HENT: Normocephalic, atraumatic, bilateral external ears normal, oropharynx moist, No exudates or erythema.   Eyes: PERRLA, conjunctiva pink, no scleral icterus.   Cardiovascular: Regular rate and rhythm no murmurs rubs gallops.  Respiratory: Mild bilateral expiratory wheezes, poor air movement.  Gastrointestinal: Positive bowel sounds, nontender and nondistended.  Skin: No " erythema, no rash.   Genitourinary:  No costovertebral angle tenderness.   Neurologic: Alert & oriented x 3, cranial nerves 2-12 intact by passive exam.  No focal deficit noted.  Psychiatric: Affect normal, Judgment normal, Mood normal.     DIFFERENTIAL DIAGNOSIS:    ACS  GERD  COPD exacerbation  Epigastric pain    EKG    Reviewed and shows sinus rhythm, mild diffuse ST depressions with no reciprocal change.    RADIOLOGY/PROCEDURES  DX-CHEST-PORTABLE (1 VIEW)   Final Result         1.  No acute cardiopulmonary disease.          LABORATORY: Reviewed as below.  Results for orders placed or performed during the hospital encounter of 02/25/20   CBC with Differential   Result Value Ref Range    WBC 16.7 (H) 4.8 - 10.8 K/uL    RBC 4.02 (L) 4.70 - 6.10 M/uL    Hemoglobin 12.3 (L) 14.0 - 18.0 g/dL    Hematocrit 37.8 (L) 42.0 - 52.0 %    MCV 94.0 81.4 - 97.8 fL    MCH 30.6 27.0 - 33.0 pg    MCHC 32.5 (L) 33.7 - 35.3 g/dL    RDW 42.3 35.9 - 50.0 fL    Platelet Count 192 164 - 446 K/uL    MPV 10.6 9.0 - 12.9 fL    Neutrophils-Polys 90.80 (H) 44.00 - 72.00 %    Lymphocytes 6.60 (L) 22.00 - 41.00 %    Monocytes 1.80 0.00 - 13.40 %    Eosinophils 0.00 0.00 - 6.90 %    Basophils 0.20 0.00 - 1.80 %    Immature Granulocytes 0.60 0.00 - 0.90 %    Nucleated RBC 0.00 /100 WBC    Neutrophils (Absolute) 15.13 (H) 1.82 - 7.42 K/uL    Lymphs (Absolute) 1.10 1.00 - 4.80 K/uL    Monos (Absolute) 0.30 0.00 - 0.85 K/uL    Eos (Absolute) 0.00 0.00 - 0.51 K/uL    Baso (Absolute) 0.04 0.00 - 0.12 K/uL    Immature Granulocytes (abs) 0.10 0.00 - 0.11 K/uL    NRBC (Absolute) 0.00 K/uL   Complete Metabolic Panel (CMP)   Result Value Ref Range    Sodium 136 135 - 145 mmol/L    Potassium 4.3 3.6 - 5.5 mmol/L    Chloride 96 96 - 112 mmol/L    Co2 30 20 - 33 mmol/L    Anion Gap 10.0 0.0 - 11.9    Glucose 279 (H) 65 - 99 mg/dL    Bun 24 (H) 8 - 22 mg/dL    Creatinine 0.74 0.50 - 1.40 mg/dL    Calcium 9.3 8.5 - 10.5 mg/dL    AST(SGOT) 8 (L) 12 - 45 U/L     ALT(SGPT) 12 2 - 50 U/L    Alkaline Phosphatase 55 30 - 99 U/L    Total Bilirubin 0.3 0.1 - 1.5 mg/dL    Albumin 4.4 3.2 - 4.9 g/dL    Total Protein 7.2 6.0 - 8.2 g/dL    Globulin 2.8 1.9 - 3.5 g/dL    A-G Ratio 1.6 g/dL   Troponin   Result Value Ref Range    Troponin T 21 (H) 6 - 19 ng/L   proBrain Natriuretic Peptide, NT   Result Value Ref Range    NT-proBNP 464 (H) 0 - 125 pg/mL   ESTIMATED GFR   Result Value Ref Range    GFR If African American >60 >60 mL/min/1.73 m 2    GFR If Non African American >60 >60 mL/min/1.73 m 2   TROPONIN   Result Value Ref Range    Troponin T 21 (H) 6 - 19 ng/L   EKG   Result Value Ref Range    Report       [facility] Emergency Dept.    Test Date:  2020  Pt Name:    MATIAS SALINAS               Department: ER  MRN:        [id number]                      Room:       [facility]  Gender:     Male                         Technician: [id number]  :        1950                   Requested By:ER TRIAGE PROTOCOL  Order #:    [id number]                    Reading MD: JOE MADRIGAL MD    Measurements  Intervals                                Axis  Rate:       93                           P:          69  ND:         139                          QRS:        31  QRSD:       88                           T:          65  QT:         347  QTc:        432    Interpretive Statements  Sinus rhythm  Probable left atrial enlargement  Minimal ST depression, diffuse leads  Compared to ECG 2016 10:14:55  ST (T wave) deviation now present  Electronically Signed On 2020 20:29:32 PST by JOE MADRIGAL MD         INTERVENTIONS:  Medications   oxyCODONE-acetaminophen (PERCOCET-10)  MG per tablet 1 Tab (1 Tab Oral Given 20 4258)       COURSE & MEDICAL DECISION MAKING  Discussed with Dr. Madrigal.    69-year-old male with multiple cardiac risk factors presents with substernal sharp chest pain and diaphoresis several episodes over the last several days each lasting  up to an hour.  We will get a chest x-ray, troponin, EKG, CBC, CMP.  He is not acutely having chest pain now he did get aspirin 325 and route.  We will also give a DuoNeb treatment    9:28 PM troponin elevated 21, BNP elevated at 460.  Patient denies history of heart failure being on any medications in the past.  Does not seem clinically fluid overloaded.  Leukocytosis 16 unknown etiology for now, does not endorse any infectious symptoms.  We will repeat a troponin II hours after first draw.    This patient's repeat troponin was unchanged, and not consistent with ACS.  The patient is relieved, and we discussed admitting to the hospital for further cardiac evaluation, but his symptoms seem more abdominal.  He is relieved by his evaluation, and happy to be discharged home.  He does have some significant comorbidities in his medical history, so he was strongly encouraged to schedule follow-up with his primary care doctor, and return here for any worsening symptoms.  He does feel significantly better now.    PLAN:   The patient will return for new or worsening symptoms and is stable at the time of discharge.    The patient is referred to a primary physician for blood pressure management, diabetic screening, and for all other preventative health concerns.    DISPOSITION:  Patient will be discharged home in stable condition.    FOLLOW UP:  Marlo Reynolds M.D.  [address]  [address]  [facility] [address] [phone]    Schedule an appointment as soon as possible for a visit       [facility] Emergency Dept  [address] [facility] [address] [phone]    If symptoms worsen      OUTPATIENT MEDICATIONS:  Discharge Medication List as of 2/25/2020 10:39 PM          FINAL IMPRESSION  1. Epigastric pain          Electronically signed by: Lopez Wei M.D., 2/25/2020 8:33 PM    I independently evaluated the patient and repeated the important components of the history and physical. I discussed the  management with the resident. I have reviewed and agree with the pertinent clinical information above including history, exam, study findings, and recommendations.  On my evaluation, this patient's discomfort does seem to be epigastric abdominal pain rather than chest pain.  He has no typical symptoms of ACS, and has had a reassuring, and fairly extended evaluation.  He feels better.  He has a nonspecific leukocytosis, and significant medical comorbidities, so the residents and I did emphasize to the patient at the bedside the importance of returning here for worsening symptoms rather than gradual improvement.    Electronically signed by: Jeramy Arzate M.D., 2/27/2020 10:11 PM

## 2020-02-26 NOTE — ED NOTES
Discharge instructions given to pt. Prescriptions unchanged. Pt educated, verbalizes understanding. All belongings accounted for. Pt ambulated out of ED with steady gait to go home with O2 tank from respiratory aide. PIV removed and dressing applied.

## 2022-10-20 NOTE — TELEPHONE ENCOUNTER
Tried to contact pharmacy and approve early refill , Pharmacy states pt only dropping of 2 prescriptions first one to fill 8/3/2017 and another to fill 9/1/2017. Pharmacy is going to find out what happened to the 3rd script for July. Awaiting response    Griseofulvin Counseling:  I discussed with the patient the risks of griseofulvin including but not limited to photosensitivity, cytopenia, liver damage, nausea/vomiting and severe allergy.  The patient understands that this medication is best absorbed when taken with a fatty meal (e.g., ice cream or french fries).

## 2023-08-28 NOTE — ADDENDUM NOTE
Addended by: KATE POWELL on: 2/9/2017 09:40 AM     Modules accepted: Orders, Medications     Call received from [facility].  Scheduled f/u with Dr Ramirez for 10/27/10505.    Having eye exam this week, will have MD notes sent to [facility].    OK to send refill?

## 2024-06-09 NOTE — ASSESSMENT & PLAN NOTE
Patient has run out of omeprazole 20 mg twice daily. He is having recurrent GERD and is requesting a refill.   Pt ambulatory to bathroom with steady gait.  No assistance needed.